# Patient Record
Sex: FEMALE | Race: WHITE | NOT HISPANIC OR LATINO | Employment: FULL TIME | URBAN - NONMETROPOLITAN AREA
[De-identification: names, ages, dates, MRNs, and addresses within clinical notes are randomized per-mention and may not be internally consistent; named-entity substitution may affect disease eponyms.]

---

## 2017-01-17 ENCOUNTER — OFFICE VISIT (OUTPATIENT)
Dept: FAMILY MEDICINE CLINIC | Facility: CLINIC | Age: 22
End: 2017-01-17

## 2017-01-17 VITALS
BODY MASS INDEX: 22.58 KG/M2 | HEART RATE: 128 BPM | SYSTOLIC BLOOD PRESSURE: 118 MMHG | HEIGHT: 59 IN | WEIGHT: 112 LBS | TEMPERATURE: 98.1 F | DIASTOLIC BLOOD PRESSURE: 78 MMHG

## 2017-01-17 DIAGNOSIS — R00.0 SINUS TACHYCARDIA: ICD-10-CM

## 2017-01-17 DIAGNOSIS — F51.01 PRIMARY INSOMNIA: ICD-10-CM

## 2017-01-17 DIAGNOSIS — R53.83 FATIGUE, UNSPECIFIED TYPE: ICD-10-CM

## 2017-01-17 DIAGNOSIS — K58.2 IRRITABLE BOWEL SYNDROME WITH BOTH CONSTIPATION AND DIARRHEA: Primary | ICD-10-CM

## 2017-01-17 LAB
BASOPHILS NFR BLD AUTO: 0.3 % (ref 0–2)
EOSINOPHIL NFR BLD AUTO: 0.8 % (ref 0–7)
ERYTHROCYTE [DISTWIDTH] IN BLOOD: 11.5 % (ref 11.5–14.5)
GRANULOCYTES NFR BLD AUTO: 58.3 % (ref 37–80)
HCT VFR BLD CALC: 39.2 % (ref 35–45)
HGB BLD-MCNC: 14 GM/DL (ref 12–15.5)
LYMPHOCYTES NFR BLD AUTO: 33.2 % (ref 10–50)
MCH RBC QN: 31.4 PG (ref 26–34)
MCHC RBC-ENTMCNC: 35.7 GM/DL (ref 31.4–36)
MCV RBC: 87.9 FL (ref 80–98)
MONOCYTES NFR BLD AUTO: 7.4 % (ref 0–12)
NRBC BLD AUTO-RTO: 0 %
NRBC SPEC MANUAL: 0
PLATELET # BLD: 184 X1000/MM3 (ref 150–450)
PMV BLD: 11.1 FL (ref 8–12)
RBC # BLD: 4.46 MEGA/MM3 (ref 3.77–5.16)
WBC # BLD: 6.6 X1000/UL (ref 3.2–9.8)

## 2017-01-17 PROCEDURE — 99214 OFFICE O/P EST MOD 30 MIN: CPT | Performed by: FAMILY MEDICINE

## 2017-01-17 RX ORDER — HYOSCYAMINE SULFATE 0.125 MG
0.12 TABLET ORAL EVERY 4 HOURS PRN
Qty: 120 TABLET | Refills: 5 | Status: SHIPPED | OUTPATIENT
Start: 2017-01-17 | End: 2017-03-17 | Stop reason: SDUPTHER

## 2017-01-17 RX ORDER — METOPROLOL SUCCINATE 25 MG/1
25 TABLET, EXTENDED RELEASE ORAL DAILY
Qty: 30 TABLET | Refills: 5 | Status: SHIPPED | OUTPATIENT
Start: 2017-01-17 | End: 2017-02-17 | Stop reason: SDUPTHER

## 2017-01-17 NOTE — PROGRESS NOTES
"Subjective   Chief Complaint   Patient presents with   • Irritable Bowel Syndrome     follw up     Nai Gold is a 21 y.o. female.   Irritable Bowel Syndrome (follw up)    Insomnia   This is a chronic problem. The current episode started more than 1 year ago. The problem occurs daily. The problem has been unchanged. Associated symptoms include arthralgias, fatigue and myalgias. The symptoms are aggravated by stress. She has tried nothing for the symptoms.      IBS with predominant diarrhea and has since developed issues with constipation recently  Previously failed bentyl for antispasmodic  egd done with normal results  Cannot tolerate prep for colonoscopy  Has previous abdominal surgery with cholecystectomy and  section  Has not tried cholestyramine due to issues with constipation, bloating  Has tired a diet high in fruits and vegetables    The following portions of the patient's history were reviewed and updated as appropriate: allergies, current medications, past family history, past medical history, past social history, past surgical history and problem list.    Review of Systems   Constitutional: Positive for fatigue. Negative for appetite change.   HENT: Negative for ear pain and rhinorrhea.    Eyes: Negative for pain.   Respiratory: Negative for shortness of breath.    Cardiovascular: Negative for palpitations.   Gastrointestinal: Positive for constipation. Negative for diarrhea.        Abdominal bloating   Genitourinary: Negative for dysuria.   Musculoskeletal: Positive for arthralgias and myalgias. Negative for back pain.   Neurological: Negative for dizziness.   Psychiatric/Behavioral: The patient has insomnia.        Objective   Visit Vitals   • /78   • Pulse (!) 128   • Temp 98.1 °F (36.7 °C)   • Ht 59\" (149.9 cm)   • Wt 112 lb (50.8 kg)   • LMP 2017   • BMI 22.62 kg/m2     Physical Exam   Constitutional: She is oriented to person, place, and time. She appears well-developed and " well-nourished.   HENT:   Head: Normocephalic and atraumatic.   Eyes: Pupils are equal, round, and reactive to light.   Neck: Normal range of motion. Neck supple.   Cardiovascular: Normal rate, regular rhythm and normal heart sounds.    Pulmonary/Chest: Effort normal and breath sounds normal. No respiratory distress. She has no wheezes. She has no rales.   Abdominal: Soft. Bowel sounds are normal. There is no tenderness.   Musculoskeletal: Normal range of motion.   Neurological: She is alert and oriented to person, place, and time.   Skin: Skin is warm and dry.   Psychiatric: She has a normal mood and affect.   Nursing note and vitals reviewed.      Assessment/Plan   Problems Addressed this Visit        Cardiovascular and Mediastinum    Sinus tachycardia    Relevant Medications    metoprolol succinate XL (TOPROL XL) 25 MG 24 hr tablet       Digestive    Irritable bowel syndrome with both constipation and diarrhea - Primary       Other    Primary insomnia    Fatigue    Relevant Orders    CBC & Differential    Comprehensive Metabolic Panel    Vitamin D 25 Hydroxy    Vitamin B12 & Folate    TSH+Free T4        Ordered lab work    Recommended melatonin at bedtime    Dr Peña colonoscopy - scheduled for consultation tomorrow    Start metoprolol for heart rate    Educational handout on diet for IBS provided    Start probiotic    Start levbid    Recheck in 2 - 4 weeks

## 2017-01-17 NOTE — PATIENT INSTRUCTIONS
Diet for Irritable Bowel Syndrome  When you have irritable bowel syndrome (IBS), the foods you eat and your eating habits are very important. IBS may cause various symptoms, such as abdominal pain, constipation, or diarrhea. Choosing the right foods can help ease discomfort caused by these symptoms. Work with your health care provider and dietitian to find the best eating plan to help control your symptoms.  WHAT GENERAL GUIDELINES DO I NEED TO FOLLOW?  · Keep a food diary. This will help you identify foods that cause symptoms. Write down:    What you eat and when.    What symptoms you have.    When symptoms occur in relation to your meals.  · Avoid foods that cause symptoms. Talk with your dietitian about other ways to get the same nutrients that are in these foods.  · Eat more foods that contain fiber. Take a fiber supplement if directed by your dietitian.  · Eat your meals slowly, in a relaxed setting.  · Aim to eat 5-6 small meals per day. Do not skip meals.  · Drink enough fluids to keep your urine clear or pale yellow.  · Ask your health care provider if you should take an over-the-counter probiotic during flare-ups to help restore healthy gut bacteria.  · If you have cramping or diarrhea, try making your meals low in fat and high in carbohydrates. Examples of carbohydrates are pasta, rice, whole grain breads and cereals, fruits, and vegetables.  · If dairy products cause your symptoms to flare up, try eating less of them. You might be able to handle yogurt better than other dairy products because it contains bacteria that help with digestion.  WHAT FOODS ARE NOT RECOMMENDED?  The following are some foods and drinks that may worsen your symptoms:  · Fatty foods, such as French fries.  · Milk products, such as cheese or ice cream.  · Chocolate.  · Alcohol.  · Products with caffeine, such as coffee.  · Carbonated drinks, such as soda.  The items listed above may not be a complete list of foods and beverages to  avoid. Contact your dietitian for more information.  WHAT FOODS ARE GOOD SOURCES OF FIBER?  Your health care provider or dietitian may recommend that you eat more foods that contain fiber. Fiber can help reduce constipation and other IBS symptoms. Add foods with fiber to your diet a little at a time so that your body can get used to them. Too much fiber at once might cause gas and swelling of your abdomen. The following are some foods that are good sources of fiber:  · Apples.  · Peaches.  · Pears.  · Berries.  · Figs.  · Broccoli (raw).  · Cabbage.  · Carrots.  · Raw peas.  · Kidney beans.  · Lima beans.  · Whole grain bread.  · Whole grain cereal.  FOR MORE INFORMATION   International Foundation for Functional Gastrointestinal Disorders: www.iffgd.org  National [place] of Diabetes and Digestive and Kidney Diseases: www.niddk.nih.gov/health-information/health-topics/digestive-diseases/ibs/Pages/facts.aspx     This information is not intended to replace advice given to you by your health care provider. Make sure you discuss any questions you have with your health care provider.     Document Released: 03/09/2005 Document Revised: 01/08/2016 Document Reviewed: 03/20/2015  [employer] Interactive Patient Education ©2016 [employer] Inc.

## 2017-01-17 NOTE — MR AVS SNAPSHOT
Nai Gold   1/17/2017 9:30 AM   Office Visit    Dept Phone:  [phone]   Encounter #:  [id number]    Provider:  Beth Avila MD   Department:  [facility]                Your Full Care Plan              Today's Medication Changes          These changes are accurate as of: 1/17/17 10:04 AM.  If you have any questions, ask your nurse or doctor.               New Medication(s)Ordered:     hyoscyamine 0.125 MG tablet   Commonly known as:  ANASPAZ,LEVSIN   Take 1 tablet by mouth Every 4 (Four) Hours As Needed for cramping or diarrhea.   Started by:  Beth Avila MD       metoprolol succinate XL 25 MG 24 hr tablet   Commonly known as:  TOPROL XL   Take 1 tablet by mouth Daily.   Started by:  Beth Avila MD         Stop taking medication(s)listed here:     cholestyramine light 4 G packet   Commonly known as:  PREVALITE   Stopped by:  Beth Avila MD                Where to Get Your Medications      These medications were sent to [address] [phone] [facility] [phone] [address] [address]     Phone:  [phone]     hyoscyamine 0.125 MG tablet    metoprolol succinate XL 25 MG 24 hr tablet                  Your Updated Medication List          This list is accurate as of: 1/17/17 10:04 AM.  Always use your most recent med list.                hyoscyamine 0.125 MG tablet   Commonly known as:  ANASPAZ,LEVSIN   Take 1 tablet by mouth Every 4 (Four) Hours As Needed for cramping or diarrhea.       metoprolol succinate XL 25 MG 24 hr tablet   Commonly known as:  TOPROL XL   Take 1 tablet by mouth Daily.       * NUVARING VA       * NUVARING 0.12-0.015 MG/24HR vaginal ring   Generic drug:  etonogestrel-ethinyl estradiol       ondansetron 4 MG tablet   Commonly known as:  ZOFRAN   Take 1 tablet by mouth Every 8 (Eight) Hours.       raNITIdine 150 MG tablet   Commonly known as:  ZANTAC       ZOLOFT 50 MG tablet   Generic drug:  sertraline       * Notice:  This list has 2 medication(s) that are the same as other medications prescribed for you. Read the directions carefully, and ask your doctor or other care provider to review them with you.            We Performed the Following     CBC & Differential     TSH+Free T4     Vitamin B12 & Folate     Vitamin D 25 Hydroxy       You Were Diagnosed With        Codes Comments    Irritable bowel syndrome with both constipation and diarrhea    -  Primary ICD-10-CM: K58.2  ICD-9-CM: 564.1     Fatigue, unspecified type     ICD-10-CM: R53.83  ICD-9-CM: 780.79     Sinus tachycardia     ICD-10-CM: R00.0  ICD-9-CM: 427.89       Instructions    Diet for Irritable Bowel Syndrome  When you have irritable bowel syndrome (IBS), the foods you eat and your eating habits are very important. IBS may cause various symptoms, such as abdominal pain, constipation, or diarrhea. Choosing the right foods can help ease discomfort caused by these symptoms. Work with your health care provider and dietitian to find the best eating plan to help control your symptoms.  WHAT GENERAL GUIDELINES DO I NEED TO FOLLOW?  · Keep a food diary. This will help you identify foods that cause symptoms. Write down:    What you eat and when.    What symptoms you have.    When symptoms occur in relation to your meals.  · Avoid foods that cause symptoms. Talk with your dietitian about other ways to get the same nutrients that are in these foods.  · Eat more foods that contain fiber. Take a fiber supplement if directed by your dietitian.  · Eat your meals slowly, in a relaxed setting.  · Aim to eat 5-6 small meals per day. Do not skip meals.  · Drink enough fluids to keep your urine clear or pale yellow.  · Ask your health care provider if you should take an over-the-counter probiotic during flare-ups to help restore healthy gut bacteria.  · If you have  cramping or diarrhea, try making your meals low in fat and high in carbohydrates. Examples of carbohydrates are pasta, rice, whole grain breads and cereals, fruits, and vegetables.  · If dairy products cause your symptoms to flare up, try eating less of them. You might be able to handle yogurt better than other dairy products because it contains bacteria that help with digestion.  WHAT FOODS ARE NOT RECOMMENDED?  The following are some foods and drinks that may worsen your symptoms:  · Fatty foods, such as French fries.  · Milk products, such as cheese or ice cream.  · Chocolate.  · Alcohol.  · Products with caffeine, such as coffee.  · Carbonated drinks, such as soda.  The items listed above may not be a complete list of foods and beverages to avoid. Contact your dietitian for more information.  WHAT FOODS ARE GOOD SOURCES OF FIBER?  Your health care provider or dietitian may recommend that you eat more foods that contain fiber. Fiber can help reduce constipation and other IBS symptoms. Add foods with fiber to your diet a little at a time so that your body can get used to them. Too much fiber at once might cause gas and swelling of your abdomen. The following are some foods that are good sources of fiber:  · Apples.  · Peaches.  · Pears.  · Berries.  · Figs.  · Broccoli (raw).  · Cabbage.  · Carrots.  · Raw peas.  · Kidney beans.  · Lima beans.  · Whole grain bread.  · Whole grain cereal.  FOR MORE INFORMATION   International Foundation for Functional Gastrointestinal Disorders: www.iffgd.org  National [place] of Diabetes and Digestive and Kidney Diseases: www.niddk.nih.gov/health-information/health-topics/digestive-diseases/ibs/Pages/facts.aspx     This information is not intended to replace advice given to you by your health care provider. Make sure you discuss any questions you have with your health care provider.     Document Released: 03/09/2005 Document Revised: 01/08/2016 Document Reviewed:  "03/20/2015  [employer] Interactive Patient Education ©2016 [employer]       Patient Instructions History      Upcoming Appointments     Visit Type Date Time Department    FOLLOW UP 1/17/2017  9:30 AM [facility]    CONSULT 1/18/2017  3:00 PM [facility]      [facility] Signup     Our records indicate that you have an active [employer] account.    You can view your After Visit Summary by going to [employer] and logging in with your [employer] username and password.  If you don't have a [employer] username and password but a parent or guardian has access to your record, the parent or guardian should login with their own [employer] username and password and access your record to view the After Visit Summary.    If you have questions, you can email [email or web address] or call [phone] to talk to our [employer] staff.  Remember, [employer] is NOT to be used for urgent needs.  For medical emergencies, dial 911.               Other Info from Your Visit           Your Appointments     Jan 18, 2017  3:00 PM CST   Consult with Munir Peña MD   [facility] GENERAL SURGERY (--)    [address] [place] [address] Dr  [facility] [address] [place] [id number] [phone]           Please bring all current insurance documents. Bring list of current medications.              Allergies     Percocet [Oxycodone-acetaminophen]        Reason for Visit     Irritable Bowel Syndrome follw up      Vital Signs     Blood Pressure Pulse Temperature Height Weight Last Menstrual Period    118/78 (85 %/ 91 %)* 128 98.1 °F (36.7 °C) 59\" (149.9 cm) (2 %, Z= -2.06)† 112 lb (50.8 kg) (20 %, Z= -0.83)† 01/09/2017    Body Mass Index Smoking Status                22.62 kg/m2 (62 %, Z= 0.31)† Never Smoker        *BP percentiles are based on NHBPEP's 4th Report    †Growth percentiles are based on CDC 2-20 Years data.      Problems and Diagnoses Noted     Irritable bowel syndrome with both constipation and " diarrhea    Tiredness        Sinus tachycardia

## 2017-01-18 ENCOUNTER — TELEPHONE (OUTPATIENT)
Dept: FAMILY MEDICINE CLINIC | Facility: CLINIC | Age: 22
End: 2017-01-18

## 2017-01-18 ENCOUNTER — CONSULT (OUTPATIENT)
Dept: SURGERY | Facility: CLINIC | Age: 22
End: 2017-01-18

## 2017-01-18 VITALS
DIASTOLIC BLOOD PRESSURE: 84 MMHG | BODY MASS INDEX: 22.38 KG/M2 | HEIGHT: 59 IN | WEIGHT: 111 LBS | SYSTOLIC BLOOD PRESSURE: 118 MMHG

## 2017-01-18 DIAGNOSIS — K58.2 IRRITABLE BOWEL SYNDROME WITH BOTH CONSTIPATION AND DIARRHEA: Primary | ICD-10-CM

## 2017-01-18 LAB — 25(OH)D2 SERPL-MCNC: 36.5 NG/ML (ref 30–100)

## 2017-01-18 PROCEDURE — 99203 OFFICE O/P NEW LOW 30 MIN: CPT | Performed by: SURGERY

## 2017-01-18 NOTE — PROGRESS NOTES
Subjective   Nai Gold is a 21 y.o. female.     History of Present Illness     Referred by Dr. Lo for consideration for colonoscopy.  Patient is now 21 years of age and since she was 12 years of age she's had problems with constipation and diarrhea with abdominal pain.  She is actually been followed by GI in [place] and then a GI physician in [place] prior to removing to [facility].  The GI physician in [place] had attempted to do a colonoscopy on her but she could not tolerate the prep so they did not proceed been managing her symptoms are actually after her baby was born 16 months ago she says that her symptoms are less frequent and seems to be less severe.  She tells me that she was worked up by the GI doctors for Crohn's disease and did not think that she had Crohn's disease.  She's had her gallbladder removed.  Social History     Social History   • Marital status: Single     Spouse name: N/A   • Number of children: N/A   • Years of education: N/A     Occupational History   • Not on file.     Social History Main Topics   • Smoking status: Never Smoker   • Smokeless tobacco: Never Used   • Alcohol use No   • Drug use: No   • Sexual activity: Yes     Partners: Male      Comment: nuva ring for birth control     Other Topics Concern   • Not on file     Social History Narrative       Past Medical History   Diagnosis Date   • Depression    • Dyspepsia    • GERD (gastroesophageal reflux disease)    • IBS (irritable bowel syndrome)    • Sinus tachycardia        Family History   Problem Relation Age of Onset   • Hypertension Mother    • Hypertension Father    • Arthritis Father    • Hyperlipidemia Father    • Heart disease Father    • No Known Problems Sister    • No Known Problems Brother        Past Surgical History   Procedure Laterality Date   • Tonsillectomy     • Cholecystectomy     •  section     • Endoscopy  2016       Review of Systems   Constitutional: Negative.    Eyes: Negative.     Respiratory: Positive for cough and shortness of breath. Negative for choking.    Cardiovascular: Positive for palpitations.   Gastrointestinal: Positive for abdominal pain, constipation, nausea and vomiting.   Endocrine: Negative.    Genitourinary: Positive for urgency.   Musculoskeletal: Negative.    Skin: Negative.    Allergic/Immunologic: Negative.    Neurological: Positive for headaches.   Hematological: Negative.    Psychiatric/Behavioral: The patient is nervous/anxious.        Objective   Physical Exam   Constitutional: She is oriented to person, place, and time. She appears well-developed and well-nourished. No distress.   HENT:   Head: Normocephalic and atraumatic.   Nose: Nose normal.   Eyes: Conjunctivae are normal.   Neck: Normal range of motion. No tracheal deviation present. No thyromegaly present.   Cardiovascular: Normal rate, regular rhythm and normal heart sounds.    No murmur heard.  Pulmonary/Chest: Effort normal and breath sounds normal. No respiratory distress. She has no wheezes. She has no rales. She exhibits no tenderness.   Abdominal: Soft. She exhibits no distension. There is no tenderness. There is no rebound and no guarding. No hernia.   Musculoskeletal: She exhibits no tenderness or deformity.   Neurological: She is alert and oriented to person, place, and time.   Skin: Skin is warm and dry. No rash noted.   Psychiatric: She has a normal mood and affect. Her behavior is normal. Judgment and thought content normal.   Vitals reviewed.      Assessment/Plan   Symptoms a very well be consistent with irritable bowel syndrome.  Symptoms ask he seemed to be improving and she seems to be managing things with her BMI of over 23 currently she's had difficulty doing bowel preps in the past and I don't really think at this point doing a colonoscopy would be worthwhile unless her symptoms get worse for some reason or change.  I would also consider referral to a gastroenterologist who can assist her  in managing her symptoms especially for colonoscopies unremarkable.  I went over all this with the patient and also discussed this with Dr. Lo in a patient will follow up with Dr. Lo and will follow up with us on a when necessary basis

## 2017-01-18 NOTE — TELEPHONE ENCOUNTER
Called patient and told her lab results. She prefers to get b12  Injections. She also stated she wants to talk with doctor about her thyroid.

## 2017-01-18 NOTE — TELEPHONE ENCOUNTER
----- Message from Beth Avila MD sent at 1/18/2017 10:23 AM CST -----  b12 low.  Folate normal.  tsh - normal.  Vitamin D - normal.  Recommend replacement of b12 - need to get weekly injection x 4 and then monthly.  Will then retest blood work.  If she doesn't want injections then start oral supplement of 1000mcg daily.

## 2017-01-20 ENCOUNTER — OFFICE VISIT (OUTPATIENT)
Dept: FAMILY MEDICINE CLINIC | Facility: CLINIC | Age: 22
End: 2017-01-20

## 2017-01-20 VITALS
DIASTOLIC BLOOD PRESSURE: 74 MMHG | HEART RATE: 116 BPM | WEIGHT: 111 LBS | SYSTOLIC BLOOD PRESSURE: 118 MMHG | BODY MASS INDEX: 22.38 KG/M2 | HEIGHT: 59 IN | TEMPERATURE: 98.9 F

## 2017-01-20 DIAGNOSIS — R53.82 CHRONIC FATIGUE: Primary | ICD-10-CM

## 2017-01-20 DIAGNOSIS — K58.2 IRRITABLE BOWEL SYNDROME WITH BOTH CONSTIPATION AND DIARRHEA: ICD-10-CM

## 2017-01-20 DIAGNOSIS — R00.0 SINUS TACHYCARDIA: ICD-10-CM

## 2017-01-20 PROCEDURE — 99213 OFFICE O/P EST LOW 20 MIN: CPT | Performed by: FAMILY MEDICINE

## 2017-01-20 RX ORDER — ETONOGESTREL AND ETHINYL ESTRADIOL 11.7; 2.7 MG/1; MG/1
1 INSERT, EXTENDED RELEASE VAGINAL
Qty: 1 EACH | Refills: 12 | Status: SHIPPED | OUTPATIENT
Start: 2017-01-20 | End: 2017-06-29

## 2017-01-20 NOTE — PROGRESS NOTES
Subjective   Chief Complaint   Patient presents with   • Irritable Bowel Syndrome     4 week f/u   • Med Refill     nuva ring     Nai Gold is a 21 y.o. female.   Irritable Bowel Syndrome (4 week f/u) and Med Refill (nuva ring)    History of Present Illness     IBS with predominant diarrhea and has since developed issues with constipation recently  Previously failed bentyl for antispasmodic  egd done with normal results  Cannot tolerate prep for colonoscopy  Has previous abdominal surgery with cholecystectomy and  section  Has not tried cholestyramine due to issues with constipation, bloating  Has tired a diet high in fruits and vegetables  Started on anaspaz for cramping and diarrhea  Saw Dr Peña for possible screening colonoscopy due to uncontrolled IBS but this decided that was unnecessary until further notice    Sinus tachycardia - started on metoprolol    Fatigue - chronic fatigue x 9 years  Had a previous ultrasound of neck 3 years ago but normal  lab work returned with normal thyroid levels, vitamin D levels  But was deficient in B12 - replaced    b12 deficiency - started weekly injections    H/o grandmother has parathyroid disease with normal thyroid labs  History of fatigue, tachycardia, weight loss  PTH checked and had parathyroid disease    The following portions of the patient's history were reviewed and updated as appropriate: allergies, current medications, past family history, past medical history, past social history, past surgical history and problem list.    Review of Systems   Constitutional: Positive for fatigue. Negative for appetite change, chills and fever.   HENT: Negative for congestion, ear pain, rhinorrhea and sore throat.    Eyes: Negative for pain.   Respiratory: Negative for cough and shortness of breath.    Cardiovascular: Positive for palpitations. Negative for chest pain.   Gastrointestinal: Positive for abdominal pain. Negative for constipation, diarrhea and nausea.  "  Genitourinary: Negative for dysuria.   Musculoskeletal: Negative for back pain, joint swelling and neck pain.   Skin: Negative for rash.   Neurological: Negative for dizziness and headaches.       Objective   Visit Vitals   • /74   • Pulse 116   • Temp 98.9 °F (37.2 °C)   • Ht 59\" (149.9 cm)   • Wt 111 lb (50.3 kg)   • LMP 01/09/2017   • BMI 22.42 kg/m2     Physical Exam   Constitutional: She is oriented to person, place, and time. She appears well-developed and well-nourished.   HENT:   Head: Normocephalic and atraumatic.   Eyes: Pupils are equal, round, and reactive to light.   Neck: Normal range of motion. Neck supple.   Cardiovascular: Regular rhythm and normal heart sounds.  Tachycardia present.    Pulmonary/Chest: Effort normal and breath sounds normal. No respiratory distress. She has no wheezes. She has no rales.   Abdominal: Soft. Bowel sounds are increased. There is no tenderness.   Musculoskeletal: Normal range of motion.   Neurological: She is alert and oriented to person, place, and time.   Skin: Skin is warm and dry.   Psychiatric: She has a normal mood and affect.   Nursing note and vitals reviewed.      Assessment/Plan   Problems Addressed this Visit        Cardiovascular and Mediastinum    Sinus tachycardia       Digestive    Irritable bowel syndrome with both constipation and diarrhea       Other    Fatigue - Primary    Relevant Orders    PTH, Intact    Calcium, Ionized        Lab work ordered    Refilled nuvaring    Recheck in 4 weeks for follow up         "

## 2017-01-20 NOTE — PATIENT INSTRUCTIONS
"Vitamin B12 Deficiency  Not having enough vitamin B12 is called a deficiency. Vitamin B12 is an important vitamin. Your body needs vitamin B12 to:   · Make red blood cells.  · Make DNA. This is the genetic material inside all of your cells.  · Help your nerves work properly so they can carry messages from your brain to your body.  CAUSES  · Not eating enough foods that contain vitamin B12.  · Not having enough stomach acid and digestive juices. The body needs these to absorb vitamin B12 from the food you eat.  · Having certain digestive system diseases that make it hard to absorb vitamin B12. These diseases include Crohn's disease, chronic pancreatitis, and cystic fibrosis.  · Having pernicious anemia, which is a condition where the body has too few red blood cells. People with this condition do not make enough of a protein called \"intrinsic factor,\" which is needed to absorb vitamin B12.  · Having a surgery in which part of the stomach or small intestine is removed.  · Taking certain medicines that make it hard for the body to absorb vitamin B12. These medicines include:    Heartburn medicine (antacids and proton pump inhibitors).    A certain antibiotic medicine called neomycin, which fights infection.    Some medicines used to treat diabetes, tuberculosis, gout, and high cholesterol.  RISK FACTORS  Risk factors are things that make you more likely to develop a vitamin B12 deficiency. They include:  · Being older than 50.  · Being a vegetarian.  · Being pregnant and a vegetarian or having a poor diet.  · Taking certain drugs.  · Being an alcoholic.  SYMPTOMS  You may have a vitamin B12 deficiency with no symptoms. However, a vitamin B12 deficiency can cause health problems like anemia and nerve damage. These health problems can lead to many possible symptoms, including:  · Weakness.  · Fatigue.  · Loss of appetite.  · Weight loss.  · Numbness or tingling in your hands and feet.  · Redness and burning of the " tongue.  · Confusion or memory problems.  · Depression.  · Dizziness.  · Sensory problems, such as loss of taste, color blindness, and ringing in the ears.  · Diarrhea or constipation.  · Trouble walking.  DIAGNOSIS  Various types of tests can be given to help find the cause of your vitamin B12 deficiency. These tests include:  · A complete blood count (CBC). This test gives your caregiver an overall picture of what makes up your blood.  · A blood test to measure your B12 level.  · A blood test to measure intrinsic factor.  · An endoscopy. This procedure uses a thin tube with a camera on the end to look into your stomach or intestines.  TREATMENT  Treatment for vitamin B12 deficiency depends on what is causing it. Common options include:  · Changing your eating and drinking habits, such as:    Eating more foods that contain vitamin B12.    Not drinking as much alcohol or any alcohol.  · Taking vitamin B12 supplements. Your caregiver will tell you what dose is best for you.  · Getting vitamin B12 injections. Some people get these a few times a week. Others get them once a month.  HOME CARE INSTRUCTIONS  · Take all supplements as directed by your caregiver. Follow the directions carefully.  · Get any injections your caregiver prescribes. Do not miss your appointments.  · Eat lots of healthy foods that contain vitamin B12. Ask your caregiver if you should work with a nutritionist. Good things to include in your diet are:    Meat.    Poultry.    Fish.    Eggs.    Fortified cereal and dairy products. This means vitamin B12 has been added to the food. Check the label on the package to be sure.  · Do not abuse alcohol.  · Keep all follow-up appointments. Your caregiver will need to perform blood tests to make sure your vitamin B12 deficiency is going away.  SEEK MEDICAL CARE IF:  · You have any questions about your treatment.  · Your symptoms come back.  MAKE SURE YOU:  · Understand these instructions.  · Will watch your  condition.  · Will get help right away if you are not doing well or get worse.     This information is not intended to replace advice given to you by your health care provider. Make sure you discuss any questions you have with your health care provider.     Document Released: 03/11/2013 Document Reviewed: 05/04/2016  [employer] Interactive Patient Education ©2016 [employer]

## 2017-01-23 ENCOUNTER — TELEPHONE (OUTPATIENT)
Dept: FAMILY MEDICINE CLINIC | Facility: CLINIC | Age: 22
End: 2017-01-23

## 2017-02-17 ENCOUNTER — OFFICE VISIT (OUTPATIENT)
Dept: FAMILY MEDICINE CLINIC | Facility: CLINIC | Age: 22
End: 2017-02-17

## 2017-02-17 VITALS
TEMPERATURE: 97.7 F | BODY MASS INDEX: 21.97 KG/M2 | HEART RATE: 143 BPM | SYSTOLIC BLOOD PRESSURE: 116 MMHG | HEIGHT: 59 IN | WEIGHT: 109 LBS | DIASTOLIC BLOOD PRESSURE: 74 MMHG

## 2017-02-17 DIAGNOSIS — K58.2 IRRITABLE BOWEL SYNDROME WITH BOTH CONSTIPATION AND DIARRHEA: Primary | ICD-10-CM

## 2017-02-17 DIAGNOSIS — R00.0 SINUS TACHYCARDIA: ICD-10-CM

## 2017-02-17 DIAGNOSIS — E53.8 B12 DEFICIENCY: ICD-10-CM

## 2017-02-17 PROCEDURE — 99213 OFFICE O/P EST LOW 20 MIN: CPT | Performed by: FAMILY MEDICINE

## 2017-02-17 PROCEDURE — 96372 THER/PROPH/DIAG INJ SC/IM: CPT | Performed by: FAMILY MEDICINE

## 2017-02-17 RX ORDER — METOPROLOL SUCCINATE 50 MG/1
50 TABLET, EXTENDED RELEASE ORAL DAILY
Qty: 30 TABLET | Refills: 5 | Status: SHIPPED | OUTPATIENT
Start: 2017-02-17 | End: 2017-03-17 | Stop reason: SDUPTHER

## 2017-02-17 RX ADMIN — CYANOCOBALAMIN 1000 MCG: 1000 INJECTION, SOLUTION INTRAMUSCULAR; SUBCUTANEOUS at 10:02

## 2017-02-17 NOTE — PROGRESS NOTES
Subjective   Chief Complaint   Patient presents with   • Irritable Bowel Syndrome     4 week follow up     Nai Gold is a 21 y.o. female.   Irritable Bowel Syndrome (4 week follow up)    History of Present Illness     IBS with predominant diarrhea and has since developed issues with constipation recently  Previously failed bentyl for antispasmodic  egd done with normal results  Has previous abdominal surgery with cholecystectomy and  section  Has not tried cholestyramine due to issues with constipation, bloating  Has tired a diet high in fruits and vegetables  Started on anaspaz for cramping and diarrhea - this has improved abdominal pain  Saw Dr Peña for possible screening colonoscopy due to uncontrolled IBS but this decided that was unnecessary until further notice  [place] stool chart - averages between type 1-3  Constipation remains primary issue today    Sinus tachycardia - not controlled with metoprolol    b12 deficiency - finished weekly injections, starting monthly injections today    Depression - controlled with zoloft    The following portions of the patient's history were reviewed and updated as appropriate: allergies, current medications, past family history, past medical history, past social history, past surgical history and problem list.    Review of Systems   Constitutional: Negative for appetite change, chills, fatigue and fever.   HENT: Negative for congestion, ear pain, rhinorrhea and sore throat.    Eyes: Negative for pain.   Respiratory: Negative for cough and shortness of breath.    Cardiovascular: Positive for palpitations. Negative for chest pain.   Gastrointestinal: Positive for constipation. Negative for abdominal pain, diarrhea and nausea.   Genitourinary: Negative for dysuria.   Musculoskeletal: Negative for back pain, joint swelling and neck pain.   Skin: Negative for rash.   Neurological: Negative for dizziness and headaches.       Objective   Visit Vitals   • /74  "  • Pulse (!) 143   • Temp 97.7 °F (36.5 °C)   • Ht 59\" (149.9 cm)   • Wt 109 lb (49.4 kg)   • LMP 01/29/2017   • BMI 22.02 kg/m2     Physical Exam   Constitutional: She is oriented to person, place, and time. She appears well-developed and well-nourished.   HENT:   Head: Normocephalic and atraumatic.   Eyes: Pupils are equal, round, and reactive to light.   Neck: Normal range of motion. Neck supple.   Cardiovascular: Normal rate, regular rhythm and normal heart sounds.    Pulmonary/Chest: Effort normal and breath sounds normal. No respiratory distress. She has no wheezes. She has no rales.   Abdominal: Soft. Bowel sounds are normal.   Musculoskeletal: Normal range of motion.   Neurological: She is alert and oriented to person, place, and time.   Skin: Skin is warm and dry.   Psychiatric: She has a normal mood and affect.   Nursing note and vitals reviewed.      Assessment/Plan   Problems Addressed this Visit        Cardiovascular and Mediastinum    Sinus tachycardia    Relevant Medications    metoprolol succinate XL (TOPROL-XL) 50 MG 24 hr tablet       Digestive    Irritable bowel syndrome with both constipation and diarrhea - Primary    B12 deficiency        Increase metoprolol to 50mg - take 2 -25mg once a day    Continue with levbid PRN for abdominal pain and cramping    Add dietary fiber supplement to breakfast - start with 1/2 dose  May add also dietary fiber to regular diet - bananas, oatmeal, beans, whole grain breads  Increase water intake 9- 8oz glasses per day    b12 today and changing to monthly  Will repeat blood work after next injection    Refilled zoloft    Recheck in 4 weeks             "

## 2017-02-17 NOTE — PATIENT INSTRUCTIONS
Constipation, Adult  Constipation is when a person has fewer than three bowel movements a week, has difficulty having a bowel movement, or has stools that are dry, hard, or larger than normal. As people grow older, constipation is more common. A low-fiber diet, not taking in enough fluids, and taking certain medicines may make constipation worse.   CAUSES   · Certain medicines, such as antidepressants, pain medicine, iron supplements, antacids, and water pills.    · Certain diseases, such as diabetes, irritable bowel syndrome (IBS), thyroid disease, or depression.    · Not drinking enough water.    · Not eating enough fiber-rich foods.    · Stress or travel.    · Lack of physical activity or exercise.    · Ignoring the urge to have a bowel movement.    · Using laxatives too much.    SIGNS AND SYMPTOMS   · Having fewer than three bowel movements a week.    · Straining to have a bowel movement.    · Having stools that are hard, dry, or larger than normal.    · Feeling full or bloated.    · Pain in the lower abdomen.    · Not feeling relief after having a bowel movement.    DIAGNOSIS   Your health care provider will take a medical history and perform a physical exam. Further testing may be done for severe constipation. Some tests may include:  · A barium enema X-ray to examine your rectum, colon, and, sometimes, your small intestine.    · A sigmoidoscopy to examine your lower colon.    · A colonoscopy to examine your entire colon.  TREATMENT   Treatment will depend on the severity of your constipation and what is causing it. Some dietary treatments include drinking more fluids and eating more fiber-rich foods. Lifestyle treatments may include regular exercise. If these diet and lifestyle recommendations do not help, your health care provider may recommend taking over-the-counter laxative medicines to help you have bowel movements. Prescription medicines may be prescribed if over-the-counter medicines do not work.    HOME CARE INSTRUCTIONS   · Eat foods that have a lot of fiber, such as fruits, vegetables, whole grains, and beans.  · Limit foods high in fat and processed sugars, such as french fries, hamburgers, cookies, candies, and soda.    · A fiber supplement may be added to your diet if you cannot get enough fiber from foods.    · Drink enough fluids to keep your urine clear or pale yellow.    · Exercise regularly or as directed by your health care provider.    · Go to the restroom when you have the urge to go. Do not hold it.    · Only take over-the-counter or prescription medicines as directed by your health care provider. Do not take other medicines for constipation without talking to your health care provider first.    SEEK IMMEDIATE MEDICAL CARE IF:   · You have bright red blood in your stool.    · Your constipation lasts for more than 4 days or gets worse.    · You have abdominal or rectal pain.    · You have thin, pencil-like stools.    · You have unexplained weight loss.  MAKE SURE YOU:   · Understand these instructions.  · Will watch your condition.  · Will get help right away if you are not doing well or get worse.     This information is not intended to replace advice given to you by your health care provider. Make sure you discuss any questions you have with your health care provider.     Document Released: 09/15/2005 Document Revised: 01/08/2016 Document Reviewed: 09/29/2014  [employer] Interactive Patient Education ©2016 [employer]  High-Fiber Diet  Fiber, also called dietary fiber, is a type of carbohydrate found in fruits, vegetables, whole grains, and beans. A high-fiber diet can have many health benefits. Your health care provider may recommend a high-fiber diet to help:  · Prevent constipation. Fiber can make your bowel movements more regular.  · Lower your cholesterol.  · Relieve hemorrhoids, uncomplicated diverticulosis, or irritable bowel syndrome.  · Prevent overeating as part of a weight-loss  plan.  · Prevent heart disease, type 2 diabetes, and certain cancers.  WHAT IS MY PLAN?  The recommended daily intake of fiber includes:  · 38 grams for men under age 50.  · 30 grams for men over age 50.  · 25 grams for women under age 50.  · 21 grams for women over age 50.  You can get the recommended daily intake of dietary fiber by eating a variety of fruits, vegetables, grains, and beans. Your health care provider may also recommend a fiber supplement if it is not possible to get enough fiber through your diet.  WHAT DO I NEED TO KNOW ABOUT A HIGH-FIBER DIET?  · Fiber supplements have not been widely studied for their effectiveness, so it is better to get fiber through food sources.  · Always check the fiber content on the nutrition facts label of any prepackaged food. Look for foods that contain at least 5 grams of fiber per serving.  · Ask your dietitian if you have questions about specific foods that are related to your condition, especially if those foods are not listed in the following section.  · Increase your daily fiber consumption gradually. Increasing your intake of dietary fiber too quickly may cause bloating, cramping, or gas.  · Drink plenty of water. Water helps you to digest fiber.  WHAT FOODS CAN I EAT?  Grains  Whole-grain breads. Multigrain cereal. Oats and oatmeal. Brown rice. Barley. Bulgur wheat. Millet. Bran muffins. Popcorn. Rye wafer crackers.  Vegetables  Sweet potatoes. Spinach. Kale. Artichokes. Cabbage. Broccoli. Green peas. Carrots. Squash.  Fruits  Berries. Pears. Apples. Oranges. Avocados. Prunes and raisins. Dried figs.  Meats and Other Protein Sources  Navy, kidney, georges, and soy beans. Split peas. Lentils. Nuts and seeds.  Dairy  Fiber-fortified yogurt.  Beverages  Fiber-fortified soy milk. Fiber-fortified orange juice.  Other  Fiber bars.  The items listed above may not be a complete list of recommended foods or beverages. Contact your dietitian for more options.  WHAT FOODS  ARE NOT RECOMMENDED?  Grains  White bread. Pasta made with refined flour. White rice.  Vegetables  Fried potatoes. Canned vegetables. Well-cooked vegetables.   Fruits  Fruit juice. Cooked, strained fruit.  Meats and Other Protein Sources  Fatty cuts of meat. Fried poultry or fried fish.  Dairy  Milk. Yogurt. Cream cheese. Sour cream.  Beverages  Soft drinks.  Other  Cakes and pastries. Butter and oils.  The items listed above may not be a complete list of foods and beverages to avoid. Contact your dietitian for more information.  WHAT ARE SOME TIPS FOR INCLUDING HIGH-FIBER FOODS IN MY DIET?  · Eat a wide variety of high-fiber foods.  · Make sure that half of all grains consumed each day are whole grains.  · Replace breads and cereals made from refined flour or white flour with whole-grain breads and cereals.  · Replace white rice with brown rice, bulgur wheat, or millet.  · Start the day with a breakfast that is high in fiber, such as a cereal that contains at least 5 grams of fiber per serving.  · Use beans in place of meat in soups, salads, or pasta.  · Eat high-fiber snacks, such as berries, raw vegetables, nuts, or popcorn.     This information is not intended to replace advice given to you by your health care provider. Make sure you discuss any questions you have with your health care provider.     Document Released: 12/18/2006 Document Revised: 01/08/2016 Document Reviewed: 06/02/2015  [employer] Interactive Patient Education ©2016 [employer]

## 2017-03-15 ENCOUNTER — OFFICE VISIT (OUTPATIENT)
Dept: FAMILY MEDICINE CLINIC | Facility: CLINIC | Age: 22
End: 2017-03-15

## 2017-03-15 ENCOUNTER — TELEPHONE (OUTPATIENT)
Dept: FAMILY MEDICINE CLINIC | Facility: CLINIC | Age: 22
End: 2017-03-15

## 2017-03-15 VITALS
DIASTOLIC BLOOD PRESSURE: 68 MMHG | BODY MASS INDEX: 22.18 KG/M2 | HEART RATE: 103 BPM | HEIGHT: 59 IN | TEMPERATURE: 97.2 F | SYSTOLIC BLOOD PRESSURE: 118 MMHG | WEIGHT: 110 LBS

## 2017-03-15 DIAGNOSIS — R10.2 PELVIC PAIN: Primary | ICD-10-CM

## 2017-03-15 DIAGNOSIS — N94.10 DYSPAREUNIA IN FEMALE: ICD-10-CM

## 2017-03-15 LAB
BACTERIA UR QL AUTO: ABNORMAL /HPF
BILIRUB UR QL STRIP: NEGATIVE
CLARITY UR: ABNORMAL
COLOR UR: YELLOW
GLUCOSE UR STRIP-MCNC: NEGATIVE MG/DL
HGB UR QL STRIP.AUTO: ABNORMAL
HYALINE CASTS UR QL AUTO: ABNORMAL /LPF
KETONES UR QL STRIP: NEGATIVE
LEUKOCYTE ESTERASE UR QL STRIP.AUTO: NEGATIVE
NITRITE UR QL STRIP: NEGATIVE
PH UR STRIP.AUTO: 5.5 [PH] (ref 5.5–8)
PROT UR QL STRIP: ABNORMAL
RBC # UR: ABNORMAL /HPF
REF LAB TEST METHOD: ABNORMAL
SP GR UR STRIP: >=1.03 (ref 1–1.03)
SQUAMOUS #/AREA URNS HPF: ABNORMAL /HPF
UROBILINOGEN UR QL STRIP: ABNORMAL
WBC UR QL AUTO: ABNORMAL /HPF

## 2017-03-15 PROCEDURE — 99212 OFFICE O/P EST SF 10 MIN: CPT | Performed by: FAMILY MEDICINE

## 2017-03-15 PROCEDURE — 87480 CANDIDA DNA DIR PROBE: CPT | Performed by: FAMILY MEDICINE

## 2017-03-15 PROCEDURE — 87510 GARDNER VAG DNA DIR PROBE: CPT | Performed by: FAMILY MEDICINE

## 2017-03-15 PROCEDURE — 81001 URINALYSIS AUTO W/SCOPE: CPT | Performed by: FAMILY MEDICINE

## 2017-03-15 PROCEDURE — 87660 TRICHOMONAS VAGIN DIR PROBE: CPT | Performed by: FAMILY MEDICINE

## 2017-03-15 NOTE — TELEPHONE ENCOUNTER
----- Message from Beth Avila MD sent at 3/15/2017 12:17 PM CDT -----  Urine is negative for infection

## 2017-03-15 NOTE — PROGRESS NOTES
"Subjective   Chief Complaint   Patient presents with   • Abdominal Pain     cramping, for about 3 weeks     Nai Gold is a 21 y.o. female.   Abdominal Pain (cramping, for about 3 weeks)    Pelvic Pain   The patient's primary symptoms include pelvic pain and vaginal discharge. This is a new problem. The current episode started 1 to 4 weeks ago. The problem occurs constantly. The problem has been gradually worsening. The pain is moderate. Affected Side: midline. She is not pregnant. Associated symptoms include dysuria, flank pain, nausea and painful intercourse. Pertinent negatives include no frequency or urgency. The vaginal discharge was milky and watery. There has been no bleeding. She has not been passing clots. She has not been passing tissue. The symptoms are aggravated by intercourse. She has tried acetaminophen and NSAIDs for the symptoms. The treatment provided no relief. She is sexually active. No, her partner does not have an STD. She uses a contraceptive ring for contraception. Her menstrual history has been regular.      The following portions of the patient's history were reviewed and updated as appropriate: allergies, current medications, past family history, past medical history, past social history, past surgical history and problem list.    Review of Systems   Gastrointestinal: Positive for nausea.   Genitourinary: Positive for dyspareunia, dysuria, flank pain, pelvic pain, vaginal discharge and vaginal pain. Negative for difficulty urinating, enuresis, frequency and urgency.       Objective   Visit Vitals   • /68   • Pulse 103   • Temp 97.2 °F (36.2 °C)   • Ht 59\" (149.9 cm)   • Wt 110 lb (49.9 kg)   • LMP 02/01/2017   • BMI 22.22 kg/m2     Physical Exam   Constitutional: She is oriented to person, place, and time. She appears well-developed and well-nourished.   HENT:   Head: Normocephalic and atraumatic.   Eyes: Pupils are equal, round, and reactive to light.   Neck: Normal range of " motion. Neck supple.   Cardiovascular: Normal rate, regular rhythm and normal heart sounds.    Pulmonary/Chest: Effort normal and breath sounds normal. No respiratory distress. She has no wheezes. She has no rales.   Abdominal: Soft. Bowel sounds are normal. There is tenderness in the right lower quadrant, suprapubic area and left lower quadrant.   Genitourinary: Vagina normal. There is no rash, tenderness, lesion or injury on the right labia. There is no rash, tenderness, lesion or injury on the left labia.   Musculoskeletal: Normal range of motion.   Neurological: She is alert and oriented to person, place, and time.   Skin: Skin is warm and dry.   Psychiatric: She has a normal mood and affect.   Nursing note and vitals reviewed.      Assessment/Plan   Problems Addressed this Visit     None      Visit Diagnoses     Pelvic pain    -  Primary    Relevant Orders    Urinalysis w/Culture if Indicated (Completed)    Gardnerella vaginalis, Trichomonas vaginalis, Candida albicans, PCR    Urinalysis, Microscopic Only (Completed)    Dyspareunia in female            Urinalysis today    Vaginosis panel today    Recheck as needed

## 2017-03-17 ENCOUNTER — OFFICE VISIT (OUTPATIENT)
Dept: FAMILY MEDICINE CLINIC | Facility: CLINIC | Age: 22
End: 2017-03-17

## 2017-03-17 VITALS
DIASTOLIC BLOOD PRESSURE: 60 MMHG | TEMPERATURE: 97.3 F | HEART RATE: 124 BPM | WEIGHT: 109 LBS | BODY MASS INDEX: 21.97 KG/M2 | HEIGHT: 59 IN | SYSTOLIC BLOOD PRESSURE: 108 MMHG

## 2017-03-17 DIAGNOSIS — E53.8 B12 DEFICIENCY: ICD-10-CM

## 2017-03-17 DIAGNOSIS — K58.2 IRRITABLE BOWEL SYNDROME WITH BOTH CONSTIPATION AND DIARRHEA: Primary | ICD-10-CM

## 2017-03-17 DIAGNOSIS — R00.0 SINUS TACHYCARDIA: ICD-10-CM

## 2017-03-17 LAB
CANDIDA ALBICANS: NEGATIVE
GARDNERELLA VAGINALIS: NEGATIVE
TRICHOMONAS VAGINALIS PCR: NEGATIVE

## 2017-03-17 PROCEDURE — 99214 OFFICE O/P EST MOD 30 MIN: CPT | Performed by: FAMILY MEDICINE

## 2017-03-17 PROCEDURE — 96372 THER/PROPH/DIAG INJ SC/IM: CPT | Performed by: FAMILY MEDICINE

## 2017-03-17 RX ORDER — LUBIPROSTONE 8 UG/1
8 CAPSULE ORAL 2 TIMES DAILY WITH MEALS
Qty: 60 CAPSULE | Refills: 5 | Status: SHIPPED | OUTPATIENT
Start: 2017-03-17 | End: 2017-04-03

## 2017-03-17 RX ORDER — METOPROLOL SUCCINATE 50 MG/1
50 TABLET, EXTENDED RELEASE ORAL DAILY
COMMUNITY
End: 2017-06-05

## 2017-03-17 RX ORDER — METOPROLOL SUCCINATE 100 MG/1
100 TABLET, EXTENDED RELEASE ORAL DAILY
Qty: 30 TABLET | Refills: 5 | Status: SHIPPED | OUTPATIENT
Start: 2017-03-17 | End: 2017-03-17

## 2017-03-17 RX ORDER — HYOSCYAMINE SULFATE 0.125 MG
0.12 TABLET ORAL EVERY 4 HOURS PRN
Qty: 120 TABLET | Refills: 5 | Status: SHIPPED | OUTPATIENT
Start: 2017-03-17 | End: 2017-06-05 | Stop reason: SDUPTHER

## 2017-03-17 RX ADMIN — CYANOCOBALAMIN 1000 MCG: 1000 INJECTION, SOLUTION INTRAMUSCULAR; SUBCUTANEOUS at 09:33

## 2017-03-17 NOTE — PROGRESS NOTES
Subjective   Chief Complaint   Patient presents with   • Irritable Bowel Syndrome     4 week follow up     Nai Gold is a 21 y.o. female.   Irritable Bowel Syndrome (4 week follow up)    History of Present Illness     IBS with predominant constipation with intermittent diarrhea  Previously failed bentyl for antispasmodic  egd done with normal results  Has previous abdominal surgery with cholecystectomy and  section  Has not tried cholestyramine due to issues with constipation, bloating  Has tired a diet high in fruits and vegetables  Started on anaspaz for cramping and diarrhea - this has improved abdominal pain  Saw Dr Peña for possible screening colonoscopy due to uncontrolled IBS but this decided that was unnecessary until further notice  [place] stool chart - averages between type 1-3  Constipation remains primary issue  Last BM - 3/15, [place] stool chart type 2  Has not had a trial of amitiza or linzess    Sinus tachycardia - not controlled with metoprolol\  Having problems with titrating medication due to symptomatic hypotension    b12 deficiency - finished weekly injections, starting monthly injections today  Will repeat lab work next visit    Depression - controlled with zoloft    The following portions of the patient's history were reviewed and updated as appropriate: allergies, current medications, past family history, past medical history, past social history, past surgical history and problem list.    Review of Systems   Constitutional: Negative for appetite change, chills, fatigue and fever.   HENT: Negative for congestion, ear pain, rhinorrhea and sore throat.    Eyes: Negative for pain.   Respiratory: Negative for cough and shortness of breath.    Cardiovascular: Negative for chest pain and palpitations.   Gastrointestinal: Positive for constipation. Negative for abdominal pain, diarrhea and nausea.   Genitourinary: Negative for dysuria.   Musculoskeletal: Negative for back pain, joint  "swelling and neck pain.   Skin: Negative for rash.   Neurological: Negative for dizziness and headaches.       Objective   Visit Vitals   • /60   • Pulse (!) 124   • Temp 97.3 °F (36.3 °C)   • Ht 59\" (149.9 cm)   • Wt 109 lb (49.4 kg)   • LMP 02/01/2017   • BMI 22.02 kg/m2     Physical Exam   Constitutional: She is oriented to person, place, and time. She appears well-developed and well-nourished.   HENT:   Head: Normocephalic and atraumatic.   Eyes: Pupils are equal, round, and reactive to light.   Neck: Normal range of motion. Neck supple.   Cardiovascular: Normal rate, regular rhythm and normal heart sounds.    Pulmonary/Chest: Effort normal and breath sounds normal. No respiratory distress. She has no wheezes. She has no rales.   Abdominal: Soft. Bowel sounds are normal.   Musculoskeletal: Normal range of motion.   Neurological: She is alert and oriented to person, place, and time.   Skin: Skin is warm and dry.   Psychiatric: She has a normal mood and affect.   Nursing note and vitals reviewed.      Assessment/Plan   Problems Addressed this Visit        Cardiovascular and Mediastinum    Sinus tachycardia    Relevant Orders    Ambulatory Referral to Cardiology       Digestive    Irritable bowel syndrome with both constipation and diarrhea - Primary    Relevant Medications    lubiprostone (AMITIZA) 8 MCG capsule    B12 deficiency        Start amitiza  Samples provided in office    Continue with metoprolol  Referral to Dr Ospina    Continue with levbid    Recheck in 4 weeks               "

## 2017-03-20 ENCOUNTER — TELEPHONE (OUTPATIENT)
Dept: FAMILY MEDICINE CLINIC | Facility: CLINIC | Age: 22
End: 2017-03-20

## 2017-04-03 ENCOUNTER — OFFICE VISIT (OUTPATIENT)
Dept: FAMILY MEDICINE CLINIC | Facility: CLINIC | Age: 22
End: 2017-04-03

## 2017-04-03 VITALS
BODY MASS INDEX: 21.37 KG/M2 | OXYGEN SATURATION: 97 % | WEIGHT: 106 LBS | DIASTOLIC BLOOD PRESSURE: 68 MMHG | SYSTOLIC BLOOD PRESSURE: 118 MMHG | TEMPERATURE: 97.9 F | HEIGHT: 59 IN | HEART RATE: 131 BPM

## 2017-04-03 DIAGNOSIS — R11.2 NON-INTRACTABLE VOMITING WITH NAUSEA, UNSPECIFIED VOMITING TYPE: Primary | ICD-10-CM

## 2017-04-03 DIAGNOSIS — K58.2 IRRITABLE BOWEL SYNDROME WITH BOTH CONSTIPATION AND DIARRHEA: ICD-10-CM

## 2017-04-03 DIAGNOSIS — R00.0 SINUS TACHYCARDIA: ICD-10-CM

## 2017-04-03 PROCEDURE — 99212 OFFICE O/P EST SF 10 MIN: CPT | Performed by: FAMILY MEDICINE

## 2017-04-03 PROCEDURE — 96372 THER/PROPH/DIAG INJ SC/IM: CPT | Performed by: FAMILY MEDICINE

## 2017-04-03 RX ORDER — ONDANSETRON 4 MG/1
4 TABLET, ORALLY DISINTEGRATING ORAL EVERY 8 HOURS PRN
Qty: 30 TABLET | Refills: 5 | Status: SHIPPED | OUTPATIENT
Start: 2017-04-03 | End: 2017-06-05 | Stop reason: SINTOL

## 2017-04-03 RX ORDER — ONDANSETRON 2 MG/ML
4 INJECTION INTRAMUSCULAR; INTRAVENOUS ONCE
Status: COMPLETED | OUTPATIENT
Start: 2017-04-03 | End: 2017-04-03

## 2017-04-03 RX ORDER — LUBIPROSTONE 24 UG/1
24 CAPSULE ORAL 2 TIMES DAILY WITH MEALS
Qty: 60 CAPSULE | Refills: 5 | Status: SHIPPED | OUTPATIENT
Start: 2017-04-03 | End: 2017-04-04 | Stop reason: ALTCHOICE

## 2017-04-03 RX ADMIN — ONDANSETRON 4 MG: 2 INJECTION INTRAMUSCULAR; INTRAVENOUS at 10:12

## 2017-04-03 NOTE — PATIENT INSTRUCTIONS
zofran - 4mg IM    zofran ODT    Recommended sips of clear liquids today    In 3 days start amitiza - samples provided in the office    Recheck as needed

## 2017-04-03 NOTE — PROGRESS NOTES
Subjective   Chief Complaint   Patient presents with   • Irritable Bowel Syndrome     diarreah, headache     Nai Golden is a 21 y.o. female.   Irritable Bowel Syndrome (diarreah, headache)    History of Present Illness     IBS with predominant constipation with intermittent diarrhea  Previously failed bentyl for antispasmodic  egd done with normal results  Has previous abdominal surgery with cholecystectomy and  section  Has not tried cholestyramine due to issues with constipation, bloating  Has tired a diet high in fruits and vegetables  Started on anaspaz for cramping and diarrhea - this has improved abdominal pain  Saw Dr Peña for possible screening colonoscopy due to uncontrolled IBS but this decided that was unnecessary until further notice  Tried a trial of the amitiza -patient did very well with samples of medication, abdominal pain controlled and no issues with diarrhea or constipation  Insurance denied coverage of prescription  Due to no coverage she was given linzess samples x 9 days  Started this medication 3/24  Patient complains of diarrhea started 3/26  Which progressed to nausea with vomiting   Not tolerating PO  Stopped linzess yesterday  Symptoms have not improved    Sinus tachycardia - not controlled with metoprolol  Cannot titrate medication due to symptomatic hypotension    The following portions of the patient's history were reviewed and updated as appropriate: allergies, current medications, past family history, past medical history, past social history, past surgical history and problem list.    Review of Systems   Constitutional: Negative for appetite change, chills, fatigue and fever.   HENT: Negative for congestion, ear pain, rhinorrhea and sore throat.    Eyes: Negative for pain.   Respiratory: Negative for cough and shortness of breath.    Cardiovascular: Negative for chest pain and palpitations.   Gastrointestinal: Positive for diarrhea, nausea and vomiting. Negative  "for abdominal pain and constipation.   Genitourinary: Negative for dysuria.   Musculoskeletal: Negative for back pain, joint swelling and neck pain.   Skin: Negative for rash.   Neurological: Negative for dizziness and headaches.       Objective   /68  Pulse (!) 131  Temp 97.9 °F (36.6 °C)  Ht 59\" (149.9 cm)  Wt 106 lb (48.1 kg)  LMP 03/26/2017  SpO2 97%  BMI 21.41 kg/m2  Physical Exam   Constitutional: She is oriented to person, place, and time. She appears well-developed and well-nourished.   HENT:   Head: Normocephalic and atraumatic.   Eyes: Pupils are equal, round, and reactive to light.   Neck: Normal range of motion. Neck supple.   Cardiovascular: Regular rhythm and normal heart sounds.  Tachycardia present.    Pulmonary/Chest: Effort normal and breath sounds normal. No respiratory distress. She has no wheezes. She has no rales.   Abdominal: Soft. Bowel sounds are normal.   Musculoskeletal: Normal range of motion.   Neurological: She is alert and oriented to person, place, and time.   Skin: Skin is warm and dry.   Psychiatric: She has a normal mood and affect.   Nursing note and vitals reviewed.      Assessment/Plan   Problems Addressed this Visit        Cardiovascular and Mediastinum    Sinus tachycardia    Relevant Orders    Ambulatory Referral to Cardiology       Digestive    Irritable bowel syndrome with both constipation and diarrhea      Other Visit Diagnoses     Non-intractable vomiting with nausea, unspecified vomiting type    -  Primary    Relevant Medications    ondansetron (ZOFRAN) injection 4 mg (Start on 4/3/2017 10:45 AM)    ondansetron ODT (ZOFRAN ODT) 4 MG disintegrating tablet    lubiprostone (AMITIZA) 24 MCG capsule        zofran - 4mg IM today    zofran ODT     Recommended sips of clear liquids today    In 3 days start amitiza - samples provided in the office    Recheck as needed         "

## 2017-04-04 DIAGNOSIS — R11.2 NON-INTRACTABLE VOMITING WITH NAUSEA, UNSPECIFIED VOMITING TYPE: ICD-10-CM

## 2017-04-04 RX ORDER — LUBIPROSTONE 24 UG/1
24 CAPSULE ORAL 2 TIMES DAILY WITH MEALS
Qty: 60 CAPSULE | Refills: 5 | Status: SHIPPED | OUTPATIENT
Start: 2017-04-04 | End: 2017-04-21 | Stop reason: SDUPTHER

## 2017-04-13 ENCOUNTER — OFFICE VISIT (OUTPATIENT)
Dept: CARDIOLOGY | Facility: CLINIC | Age: 22
End: 2017-04-13

## 2017-04-13 VITALS
BODY MASS INDEX: 21.59 KG/M2 | HEART RATE: 112 BPM | DIASTOLIC BLOOD PRESSURE: 70 MMHG | SYSTOLIC BLOOD PRESSURE: 106 MMHG | OXYGEN SATURATION: 98 % | WEIGHT: 107.1 LBS | HEIGHT: 59 IN

## 2017-04-13 DIAGNOSIS — R00.0 SINUS TACHYCARDIA: ICD-10-CM

## 2017-04-13 DIAGNOSIS — R00.0 TACHYCARDIA: Primary | ICD-10-CM

## 2017-04-13 PROCEDURE — 93010 ELECTROCARDIOGRAM REPORT: CPT | Performed by: INTERNAL MEDICINE

## 2017-04-13 PROCEDURE — 99202 OFFICE O/P NEW SF 15 MIN: CPT | Performed by: INTERNAL MEDICINE

## 2017-04-13 NOTE — PROGRESS NOTES
Chief complaint : Tachycardia  \  History of Present Illness this is a very pleasant 21-year-old female who comes today for cardiac evaluation.  Patient has been suffering from sinus tachycardia now for at least two  years.  Patient stated that her tachycardia didn't get worse during her recent pregnancy about 19 months ago.  She had a normal delivery.  There was no complications according to patient.  However this fast heartbeat has become progressively worse and is affecting her daily living.  Patient is nervous due to very weak and fatigued.  Patient also complains of chronic nausea.    Subjective      Review of Systems   Constitution: Negative. Negative for decreased appetite, diaphoresis, weakness, night sweats, weight gain and weight loss.   HENT: Negative for headaches, hearing loss, nosebleeds and sore throat.    Eyes: Negative.  Negative for blurred vision and photophobia.   Cardiovascular: Positive for palpitations. Negative for chest pain, claudication, dyspnea on exertion, irregular heartbeat, leg swelling, paroxysmal nocturnal dyspnea and syncope.   Respiratory: Negative for cough, hemoptysis, shortness of breath and wheezing.    Endocrine: Negative for cold intolerance, heat intolerance, polydipsia, polyphagia and polyuria.   Hematologic/Lymphatic: Negative.    Skin: Negative for color change, dry skin, flushing, itching and rash.   Musculoskeletal: Negative.  Negative for muscle cramps, muscle weakness and myalgias.   Gastrointestinal: Positive for nausea. Negative for abdominal pain, change in bowel habit, diarrhea, hematemesis, melena and vomiting.   Genitourinary: Negative for dysuria, frequency and hematuria.   Neurological: Negative for dizziness, focal weakness, light-headedness, loss of balance, numbness and seizures.   Psychiatric/Behavioral: Negative.  Negative for substance abuse, suicidal ideas and thoughts of violence.   Allergic/Immunologic: Negative.        Past Medical History:    Diagnosis Date   • Depression    • Dyspepsia    • GERD (gastroesophageal reflux disease)    • IBS (irritable bowel syndrome)    • Sinus tachycardia        Family History   Problem Relation Age of Onset   • Hypertension Mother    • Hypertension Father    • Arthritis Father    • Hyperlipidemia Father    • Heart disease Father    • No Known Problems Sister    • No Known Problems Brother        Percocet [oxycodone-acetaminophen]     reports that she has never smoked. She has never used smokeless tobacco. She reports that she does not drink alcohol or use illicit drugs.    Objective     Vital Signs  Heart Rate:  [112] 112  BP: (106)/(70) 106/70    Physical Exam   Constitutional: She is oriented to person, place, and time. She appears well-developed and well-nourished.   HENT:   Head: Normocephalic and atraumatic.   Eyes: Conjunctivae and EOM are normal. Pupils are equal, round, and reactive to light.   Neck: Neck supple. No JVD present. Carotid bruit is not present. No tracheal deviation and no edema present.   Cardiovascular: Normal rate, regular rhythm, S1 normal, S2 normal, normal heart sounds and intact distal pulses.  Exam reveals no gallop, no S3, no S4 and no friction rub.    No murmur heard.  Pulmonary/Chest: Effort normal and breath sounds normal. She has no wheezes. She has no rales. She exhibits no tenderness.   Abdominal: Bowel sounds are normal. She exhibits no abdominal bruit and no pulsatile midline mass. There is no rebound and no guarding.   Musculoskeletal: Normal range of motion. She exhibits no edema.   Neurological: She is alert and oriented to person, place, and time.   Skin: Skin is warm and dry.   Psychiatric: She has a normal mood and affect.         ECG 12 Lead  Date/Time: 4/13/2017 6:11 PM  Performed by: TRINITY FRANCOIS  Authorized by: TRINITY FRANCOIS   Previous ECG: no previous ECG available  Rhythm: sinus rhythm  Rate: normal  BPM: 92  Conduction: conduction normal  ST Segments: ST segments  normal  T Waves: T waves normal  QRS axis: normal  Other: no other findings  Clinical impression: normal ECG            Assessment/Plan     Patient Active Problem List   Diagnosis   • GERD (gastroesophageal reflux disease)   • Dyspepsia   • Irritable bowel syndrome with both constipation and diarrhea   • Chronic nausea   • Change in color of pigmented skin lesion   • Primary insomnia   • Sinus tachycardia   • Fatigue   • B12 deficiency     1. Tachycardia  Patient currently is on metoprolol.  The Toprol cannot be increased due to low blood pressure.  Etiology of her sinus tachycardia is unclear.  We will plan further investigation.  I have advised patient to increase her salt intake.  I have encouraged her to hydrate herself as much as she can.  If she continues to be tachycardic despite conservative measures and metoprolol I will consider adding Corlanor for heart rate control.  - Holter Monitor - 48 Hour  - Adult Transthoracic Echo Complete    2. Sinus tachycardia  As above    I discussed the patients findings and my recommendations with patient.    Jaspreet Ospina MD  04/13/17  6:08 PM    EMR Dragon/Transcription disclaimer:   Much of this encounter note is an electronic transcription/translation of spoken language to printed text. The electronic translation of spoken language may permit erroneous, or at times, nonsensical words or phrases to be inadvertently transcribed; Although I have reviewed the note for such errors, some may still exist.

## 2017-04-14 ENCOUNTER — OFFICE VISIT (OUTPATIENT)
Dept: FAMILY MEDICINE CLINIC | Facility: CLINIC | Age: 22
End: 2017-04-14

## 2017-04-14 VITALS
HEIGHT: 59 IN | HEART RATE: 94 BPM | BODY MASS INDEX: 21.57 KG/M2 | SYSTOLIC BLOOD PRESSURE: 100 MMHG | OXYGEN SATURATION: 98 % | DIASTOLIC BLOOD PRESSURE: 66 MMHG | WEIGHT: 107 LBS | TEMPERATURE: 98.3 F

## 2017-04-14 DIAGNOSIS — K58.2 IRRITABLE BOWEL SYNDROME WITH BOTH CONSTIPATION AND DIARRHEA: Primary | ICD-10-CM

## 2017-04-14 DIAGNOSIS — E53.8 B12 DEFICIENCY: ICD-10-CM

## 2017-04-14 DIAGNOSIS — R00.0 SINUS TACHYCARDIA: ICD-10-CM

## 2017-04-14 PROCEDURE — 99214 OFFICE O/P EST MOD 30 MIN: CPT | Performed by: FAMILY MEDICINE

## 2017-04-14 PROCEDURE — 96372 THER/PROPH/DIAG INJ SC/IM: CPT | Performed by: FAMILY MEDICINE

## 2017-04-14 RX ADMIN — CYANOCOBALAMIN 1000 MCG: 1000 INJECTION, SOLUTION INTRAMUSCULAR; SUBCUTANEOUS at 09:36

## 2017-04-14 NOTE — PROGRESS NOTES
Subjective   Chief Complaint   Patient presents with   • Irritable Bowel Syndrome     4 week follow up     Nai Golden is a 21 y.o. female.   Irritable Bowel Syndrome (4 week follow up)    History of Present Illness     IBS with predominant constipation with intermittent diarrhea  Previously failed bentyl for antispasmodic  egd done with normal results  Has previous abdominal surgery with cholecystectomy and  section  Has failed a diet high in fruits and vegetables  Started on anaspaz for cramping and diarrhea - this has improved abdominal pain  Saw Dr Peña for possible screening colonoscopy - not done  Tried a trial of the amitiza -patient did very well with samples of medication, abdominal pain controlled and no issues with diarrhea or constipation  Had regular bowel movements  Insurance denied coverage of prescription  Due to no coverage she was given linzess samples x 9 days  Started this medication 3/24, reported side effects with diarrhea, nausea, vomiting  Medication was stopped and amitiza samples were provided to have patient restart once her symptoms resolved    Sinus tachycardia - not controlled with metoprolol  Cannot titrate medication due to symptomatic hypotension  Referred to Dr Ospina, cardiology    The following portions of the patient's history were reviewed and updated as appropriate: allergies, current medications, past family history, past medical history, past social history, past surgical history and problem list.    Review of Systems   Constitutional: Negative for appetite change, chills, fatigue and fever.   HENT: Negative for congestion, ear pain, rhinorrhea and sore throat.    Eyes: Negative for pain.   Respiratory: Negative for cough and shortness of breath.    Cardiovascular: Negative for chest pain and palpitations.   Gastrointestinal: Positive for abdominal pain and constipation. Negative for diarrhea and nausea.   Genitourinary: Negative for dysuria.   Musculoskeletal:  "Negative for back pain, joint swelling and neck pain.   Skin: Negative for rash.   Neurological: Negative for dizziness and headaches.       Objective   /66  Pulse 94  Temp 98.3 °F (36.8 °C)  Ht 59\" (149.9 cm)  Wt 107 lb (48.5 kg)  LMP 03/26/2017  SpO2 98%  BMI 21.61 kg/m2  Physical Exam   Constitutional: She is oriented to person, place, and time. She appears well-developed and well-nourished.   HENT:   Head: Normocephalic and atraumatic.   Eyes: Pupils are equal, round, and reactive to light.   Neck: Normal range of motion. Neck supple.   Cardiovascular: Normal rate, regular rhythm and normal heart sounds.    Pulmonary/Chest: Effort normal and breath sounds normal. No respiratory distress. She has no wheezes. She has no rales.   Abdominal: Soft. Bowel sounds are normal.   Musculoskeletal: Normal range of motion.   Neurological: She is alert and oriented to person, place, and time.   Skin: Skin is warm and dry.   Psychiatric: She has a normal mood and affect.   Nursing note and vitals reviewed.    Assessment/Plan   Problems Addressed this Visit        Cardiovascular and Mediastinum    Sinus tachycardia       Digestive    Irritable bowel syndrome with both constipation and diarrhea - Primary    B12 deficiency    Relevant Orders    Vitamin B12 & Folate        Continue with metoprolol  Follow up with Dr Ospina for continued care    b12 IM today  Blood work ordered for next month    amitiza samples  Call PA today and find out about approval    Continue with anaspaz    Recheck in 4 weeks         "

## 2017-04-21 DIAGNOSIS — R11.2 NON-INTRACTABLE VOMITING WITH NAUSEA, UNSPECIFIED VOMITING TYPE: ICD-10-CM

## 2017-04-21 RX ORDER — LUBIPROSTONE 24 UG/1
24 CAPSULE ORAL 2 TIMES DAILY WITH MEALS
Qty: 60 CAPSULE | Refills: 5 | Status: SHIPPED | OUTPATIENT
Start: 2017-04-21 | End: 2017-06-05

## 2017-05-03 PROCEDURE — 82607 VITAMIN B-12: CPT | Performed by: FAMILY MEDICINE

## 2017-05-03 PROCEDURE — 82746 ASSAY OF FOLIC ACID SERUM: CPT | Performed by: FAMILY MEDICINE

## 2017-05-04 LAB
FOLATE SERPL-MCNC: 9.31 NG/ML (ref 2.76–21)
VIT B12 BLD-MCNC: 432 PG/ML (ref 239–931)

## 2017-05-08 ENCOUNTER — TELEPHONE (OUTPATIENT)
Dept: FAMILY MEDICINE CLINIC | Facility: CLINIC | Age: 22
End: 2017-05-08

## 2017-05-10 ENCOUNTER — OFFICE VISIT (OUTPATIENT)
Dept: FAMILY MEDICINE CLINIC | Facility: CLINIC | Age: 22
End: 2017-05-10

## 2017-05-10 VITALS
BODY MASS INDEX: 21.57 KG/M2 | DIASTOLIC BLOOD PRESSURE: 66 MMHG | WEIGHT: 107 LBS | OXYGEN SATURATION: 98 % | SYSTOLIC BLOOD PRESSURE: 108 MMHG | TEMPERATURE: 98.4 F | HEIGHT: 59 IN | HEART RATE: 113 BPM

## 2017-05-10 DIAGNOSIS — E53.8 B12 DEFICIENCY: ICD-10-CM

## 2017-05-10 DIAGNOSIS — R11.0 CHRONIC NAUSEA: Primary | ICD-10-CM

## 2017-05-10 DIAGNOSIS — K58.2 IRRITABLE BOWEL SYNDROME WITH BOTH CONSTIPATION AND DIARRHEA: ICD-10-CM

## 2017-05-10 DIAGNOSIS — R00.0 SINUS TACHYCARDIA: ICD-10-CM

## 2017-05-10 PROCEDURE — 99214 OFFICE O/P EST MOD 30 MIN: CPT | Performed by: FAMILY MEDICINE

## 2017-05-10 PROCEDURE — 96372 THER/PROPH/DIAG INJ SC/IM: CPT | Performed by: FAMILY MEDICINE

## 2017-05-10 RX ADMIN — CYANOCOBALAMIN 1000 MCG: 1000 INJECTION, SOLUTION INTRAMUSCULAR; SUBCUTANEOUS at 09:54

## 2017-05-15 ENCOUNTER — TELEPHONE (OUTPATIENT)
Dept: FAMILY MEDICINE CLINIC | Facility: CLINIC | Age: 22
End: 2017-05-15

## 2017-05-24 LAB
BH CV ECHO MEAS - % IVS THICK: 0 %
BH CV ECHO MEAS - % LVPW THICK: 34.5 %
BH CV ECHO MEAS - ACS: 1.8 CM
BH CV ECHO MEAS - AO MAX PG (FULL): 1.5 MMHG
BH CV ECHO MEAS - AO MAX PG: 5 MMHG
BH CV ECHO MEAS - AO MEAN PG (FULL): 1 MMHG
BH CV ECHO MEAS - AO MEAN PG: 3 MMHG
BH CV ECHO MEAS - AO ROOT AREA (BSA CORRECTED): 1.5
BH CV ECHO MEAS - AO ROOT AREA: 3.6 CM^2
BH CV ECHO MEAS - AO ROOT DIAM: 2.2 CM
BH CV ECHO MEAS - AO V2 MAX: 112 CM/SEC
BH CV ECHO MEAS - AO V2 MEAN: 76.4 CM/SEC
BH CV ECHO MEAS - AO V2 VTI: 20.3 CM
BH CV ECHO MEAS - AVA(I,A): 0.8 CM^2
BH CV ECHO MEAS - AVA(I,D): 0.8 CM^2
BH CV ECHO MEAS - AVA(V,A): 0.87 CM^2
BH CV ECHO MEAS - AVA(V,D): 0.87 CM^2
BH CV ECHO MEAS - BSA(HAYCOCK): 1.4 M^2
BH CV ECHO MEAS - BSA: 1.4 M^2
BH CV ECHO MEAS - BZI_BMI: 21.6 KILOGRAMS/M^2
BH CV ECHO MEAS - BZI_METRIC_HEIGHT: 149.9 CM
BH CV ECHO MEAS - BZI_METRIC_WEIGHT: 48.5 KG
BH CV ECHO MEAS - CONTRAST EF 4CH: 59.3 ML/M^2
BH CV ECHO MEAS - EDV(CUBED): 71 ML
BH CV ECHO MEAS - EDV(MOD-SP4): 117 ML
BH CV ECHO MEAS - EDV(TEICH): 75.9 ML
BH CV ECHO MEAS - EF(CUBED): 67 %
BH CV ECHO MEAS - EF(MOD-SP4): 59.3 %
BH CV ECHO MEAS - EF(TEICH): 59 %
BH CV ECHO MEAS - ESV(CUBED): 23.4 ML
BH CV ECHO MEAS - ESV(MOD-SP4): 47.6 ML
BH CV ECHO MEAS - ESV(TEICH): 31.1 ML
BH CV ECHO MEAS - FS: 30.9 %
BH CV ECHO MEAS - IVS/LVPW: 0.91
BH CV ECHO MEAS - IVSD: 0.74 CM
BH CV ECHO MEAS - IVSS: 0.74 CM
BH CV ECHO MEAS - LA DIMENSION: 2.8 CM
BH CV ECHO MEAS - LA/AO: 1.3
BH CV ECHO MEAS - LV DIASTOLIC VOL/BSA (35-75): 82.8 ML/M^2
BH CV ECHO MEAS - LV MASS(C)D: 95.6 GRAMS
BH CV ECHO MEAS - LV MASS(C)DI: 67.6 GRAMS/M^2
BH CV ECHO MEAS - LV MASS(C)S: 67.6 GRAMS
BH CV ECHO MEAS - LV MASS(C)SI: 47.8 GRAMS/M^2
BH CV ECHO MEAS - LV MAX PG: 3.5 MMHG
BH CV ECHO MEAS - LV MEAN PG: 2 MMHG
BH CV ECHO MEAS - LV SYSTOLIC VOL/BSA (12-30): 33.7 ML/M^2
BH CV ECHO MEAS - LV V1 MAX: 93.8 CM/SEC
BH CV ECHO MEAS - LV V1 MEAN: 63.3 CM/SEC
BH CV ECHO MEAS - LV V1 VTI: 15.6 CM
BH CV ECHO MEAS - LVIDD: 4.1 CM
BH CV ECHO MEAS - LVIDS: 2.9 CM
BH CV ECHO MEAS - LVLD AP4: 7.2 CM
BH CV ECHO MEAS - LVLS AP4: 6 CM
BH CV ECHO MEAS - LVOT AREA (M): 1.1 CM^2
BH CV ECHO MEAS - LVOT AREA: 1 CM^2
BH CV ECHO MEAS - LVOT DIAM: 1.2 CM
BH CV ECHO MEAS - LVPWD: 0.82 CM
BH CV ECHO MEAS - LVPWS: 1.1 CM
BH CV ECHO MEAS - MR MAX PG: 35.8 MMHG
BH CV ECHO MEAS - MR MAX VEL: 299 CM/SEC
BH CV ECHO MEAS - MV A MAX VEL: 67.9 CM/SEC
BH CV ECHO MEAS - MV DEC SLOPE: 476 CM/SEC^2
BH CV ECHO MEAS - MV E MAX VEL: 86.3 CM/SEC
BH CV ECHO MEAS - MV E/A: 1.3
BH CV ECHO MEAS - PA ACC SLOPE: 267 CM/SEC^2
BH CV ECHO MEAS - PA ACC TIME: 0.2 SEC
BH CV ECHO MEAS - PA MAX PG: 4.1 MMHG
BH CV ECHO MEAS - PA MEAN PG: 2 MMHG
BH CV ECHO MEAS - PA PR(ACCEL): -12.4 MMHG
BH CV ECHO MEAS - PA V2 MAX: 101.4 CM/SEC
BH CV ECHO MEAS - PA V2 MEAN: 58 CM/SEC
BH CV ECHO MEAS - PA V2 VTI: 19 CM
BH CV ECHO MEAS - PULM DIAS VEL: 47.5 CM/SEC
BH CV ECHO MEAS - PULM S/D: 1
BH CV ECHO MEAS - PULM SYS VEL: 48.5 CM/SEC
BH CV ECHO MEAS - RAP SYSTOLE: 10 MMHG
BH CV ECHO MEAS - SI(AO): 52.1 ML/M^2
BH CV ECHO MEAS - SI(CUBED): 33.6 ML/M^2
BH CV ECHO MEAS - SI(LVOT): 11.5 ML/M^2
BH CV ECHO MEAS - SI(MOD-SP4): 49.1 ML/M^2
BH CV ECHO MEAS - SI(TEICH): 31.7 ML/M^2
BH CV ECHO MEAS - SV(AO): 73.7 ML
BH CV ECHO MEAS - SV(CUBED): 47.6 ML
BH CV ECHO MEAS - SV(LVOT): 16.2 ML
BH CV ECHO MEAS - SV(MOD-SP4): 69.4 ML
BH CV ECHO MEAS - SV(TEICH): 44.8 ML

## 2017-06-05 ENCOUNTER — OFFICE VISIT (OUTPATIENT)
Dept: FAMILY MEDICINE CLINIC | Facility: CLINIC | Age: 22
End: 2017-06-05

## 2017-06-05 VITALS
TEMPERATURE: 97.8 F | OXYGEN SATURATION: 99 % | SYSTOLIC BLOOD PRESSURE: 110 MMHG | DIASTOLIC BLOOD PRESSURE: 68 MMHG | WEIGHT: 105 LBS | HEART RATE: 129 BPM | BODY MASS INDEX: 21.17 KG/M2 | HEIGHT: 59 IN

## 2017-06-05 DIAGNOSIS — K58.2 IRRITABLE BOWEL SYNDROME WITH BOTH CONSTIPATION AND DIARRHEA: Primary | ICD-10-CM

## 2017-06-05 DIAGNOSIS — R00.0 SINUS TACHYCARDIA: ICD-10-CM

## 2017-06-05 DIAGNOSIS — R11.0 CHRONIC NAUSEA: ICD-10-CM

## 2017-06-05 DIAGNOSIS — K31.84 GASTROPARESIS: ICD-10-CM

## 2017-06-05 DIAGNOSIS — E53.8 B12 DEFICIENCY: ICD-10-CM

## 2017-06-05 PROCEDURE — 99214 OFFICE O/P EST MOD 30 MIN: CPT | Performed by: FAMILY MEDICINE

## 2017-06-05 PROCEDURE — 96372 THER/PROPH/DIAG INJ SC/IM: CPT | Performed by: FAMILY MEDICINE

## 2017-06-05 RX ORDER — HYOSCYAMINE SULFATE 0.125 MG
0.12 TABLET ORAL EVERY 4 HOURS PRN
Qty: 120 TABLET | Refills: 5 | Status: SHIPPED | OUTPATIENT
Start: 2017-06-05 | End: 2017-07-26

## 2017-06-05 RX ORDER — RANITIDINE 150 MG/1
150 TABLET ORAL 2 TIMES DAILY
Qty: 60 TABLET | Refills: 5 | Status: SHIPPED | OUTPATIENT
Start: 2017-06-05 | End: 2020-08-19

## 2017-06-05 RX ORDER — PROCHLORPERAZINE MALEATE 5 MG/1
5 TABLET ORAL EVERY 6 HOURS PRN
Qty: 120 TABLET | Refills: 0 | Status: SHIPPED | OUTPATIENT
Start: 2017-06-05 | End: 2017-06-26 | Stop reason: SINTOL

## 2017-06-05 RX ADMIN — CYANOCOBALAMIN 1000 MCG: 1000 INJECTION, SOLUTION INTRAMUSCULAR; SUBCUTANEOUS at 08:45

## 2017-06-05 NOTE — PROGRESS NOTES
Subjective   Chief Complaint   Patient presents with   • Follow-up     4 wk   • Results     gastric emptying study   • Medication Problem     amitiza causes nausea     Nai Golden is a 21 y.o. female.   Follow-up (4 wk); Results (gastric emptying study); and Medication Problem (amitiza causes nausea)    History of Present Illness     IBS with predominant constipation with intermittent diarrhea  Previously failed bentyl for antispasmodic  egd done with normal results, could not tolerate prep for screening colonoscopy  Has previous abdominal surgery with cholecystectomy and  section  Has failed a diet high in fruits and vegetables  Started on anaspaz PRN for cramping and diarrhea - this has improved abdominal pain  Failed linzess - caused her nausea, vomiting  Having problems with amitiza causing worsening nausea  zofran is causing constipation    Delayed gastric empyting - confirmed by gastric study  Patient previously failed reglan - caused muscle twitching in the neck    Chronic nausea - remains uncontrolled  Patient complaining of early satiety, nausea, abdominal bloating and fullness  Uses zofran PRN    b12 deficiency - improvement of b12 value from January  Level has improved from 288 to 432  Will continue monthly injections and then plan to switch to oral supplement    Sinus tachycardia - not controlled with metoprolol  Cannot titrate medication due to symptomatic hypotension  Referred to Dr Ospina, cardiology  Echocardiogram and holter monitored ordered  Recheck scheduled 2017    The following portions of the patient's history were reviewed and updated as appropriate: allergies, current medications, past family history, past medical history, past social history, past surgical history and problem list.    Review of Systems   Constitutional: Negative for appetite change, chills, fatigue and fever.   HENT: Negative for congestion, ear pain, rhinorrhea and sore throat.    Eyes: Negative for pain.  "  Respiratory: Negative for cough and shortness of breath.    Cardiovascular: Negative for chest pain and palpitations.   Gastrointestinal: Positive for nausea. Negative for abdominal pain, constipation and diarrhea.   Genitourinary: Negative for dysuria.   Musculoskeletal: Negative for back pain, joint swelling and neck pain.   Skin: Negative for rash.   Neurological: Negative for dizziness and headaches.       Objective   /68  Pulse (!) 129  Temp 97.8 °F (36.6 °C) (Tympanic)   Ht 59\" (149.9 cm)  Wt 105 lb (47.6 kg)  LMP 04/29/2017  SpO2 99%  BMI 21.21 kg/m2  Physical Exam   Constitutional: She is oriented to person, place, and time. She appears well-developed and well-nourished.   HENT:   Head: Normocephalic and atraumatic.   Eyes: Pupils are equal, round, and reactive to light.   Neck: Normal range of motion. Neck supple.   Cardiovascular: Regular rhythm and normal heart sounds.  Tachycardia present.    Pulmonary/Chest: Effort normal and breath sounds normal. No respiratory distress. She has no wheezes. She has no rales.   Abdominal: Soft. Bowel sounds are normal.   Musculoskeletal: Normal range of motion.   Neurological: She is alert and oriented to person, place, and time.   Skin: Skin is warm and dry.   Psychiatric: She has a normal mood and affect.   Nursing note and vitals reviewed.      Assessment/Plan   Problems Addressed this Visit        Cardiovascular and Mediastinum    Sinus tachycardia       Digestive    Irritable bowel syndrome with both constipation and diarrhea - Primary    Chronic nausea    B12 deficiency    Gastroparesis    Relevant Medications    hyoscyamine (ANASPAZ,LEVSIN) 0.125 MG tablet    raNITIdine (ZANTAC) 150 MG tablet        Recheck scheduled with cardiology tomorrow    Stop amitiza  Start movantik - samples provided to the patient    Start b12 IM    Reviewed gastric emptying study with patient    Stop zofran  Start compazine  Will discuss case with GI    Use anaspaz - " PRN    Recheck in 4 weeks

## 2017-06-06 ENCOUNTER — OFFICE VISIT (OUTPATIENT)
Dept: CARDIOLOGY | Facility: CLINIC | Age: 22
End: 2017-06-06

## 2017-06-06 VITALS
HEART RATE: 94 BPM | OXYGEN SATURATION: 100 % | SYSTOLIC BLOOD PRESSURE: 106 MMHG | BODY MASS INDEX: 21.23 KG/M2 | WEIGHT: 105.3 LBS | HEIGHT: 59 IN | RESPIRATION RATE: 16 BRPM | DIASTOLIC BLOOD PRESSURE: 76 MMHG

## 2017-06-06 DIAGNOSIS — R42 DIZZINESS AND GIDDINESS: Primary | ICD-10-CM

## 2017-06-06 DIAGNOSIS — R00.0 SINUS TACHYCARDIA: ICD-10-CM

## 2017-06-06 PROCEDURE — 99212 OFFICE O/P EST SF 10 MIN: CPT | Performed by: INTERNAL MEDICINE

## 2017-06-06 NOTE — PROGRESS NOTES
Chief complaint : Cardiac and dizziness    History of Present Illness this is a 21-year-old female who comes today for follow-up and discuss the results of her echocardiogram and Holter monitoring.  The Holter monitor didn't reveal evidence of sinus tachycardia.  2-D echocardiogram revealed normal LV systolic function.  Patient stated that she is having lifestyle limiting tachycardia and dizziness and is having difficulty doing her daily chores.    Subjective      Review of Systems   Constitution: Negative. Negative for decreased appetite, diaphoresis, weakness, night sweats, weight gain and weight loss.   HENT: Negative for headaches, hearing loss, nosebleeds and sore throat.    Eyes: Negative.  Negative for blurred vision and photophobia.   Cardiovascular: Positive for palpitations. Negative for chest pain, claudication, dyspnea on exertion, irregular heartbeat, leg swelling, paroxysmal nocturnal dyspnea and syncope.   Respiratory: Negative for cough, hemoptysis, shortness of breath and wheezing.    Endocrine: Negative for cold intolerance, heat intolerance, polydipsia, polyphagia and polyuria.   Hematologic/Lymphatic: Negative.    Skin: Negative for color change, dry skin, flushing, itching and rash.   Musculoskeletal: Negative.  Negative for muscle cramps, muscle weakness and myalgias.   Gastrointestinal: Negative for abdominal pain, change in bowel habit, diarrhea, hematemesis, melena, nausea and vomiting.   Genitourinary: Negative for dysuria, frequency and hematuria.   Neurological: Positive for dizziness. Negative for focal weakness, light-headedness, loss of balance, numbness and seizures.   Psychiatric/Behavioral: Negative.  Negative for substance abuse, suicidal ideas and thoughts of violence.   Allergic/Immunologic: Negative.        Past Medical History:   Diagnosis Date   • Depression    • Dyspepsia    • GERD (gastroesophageal reflux disease)    • IBS (irritable bowel syndrome)    • Sinus tachycardia         Family History   Problem Relation Age of Onset   • Hypertension Mother    • Hypertension Father    • Arthritis Father    • Hyperlipidemia Father    • Heart disease Father    • No Known Problems Sister    • No Known Problems Brother        Percocet [oxycodone-acetaminophen]     reports that she has never smoked. She has never used smokeless tobacco. She reports that she does not drink alcohol or use illicit drugs.    Objective     Vital Signs  Heart Rate:  [94] 94  Resp:  [16] 16  BP: (106)/(76) 106/76    Physical Exam   Constitutional: She is oriented to person, place, and time. She appears well-developed and well-nourished.   HENT:   Head: Normocephalic and atraumatic.   Eyes: Conjunctivae and EOM are normal. Pupils are equal, round, and reactive to light.   Neck: Neck supple. No JVD present. Carotid bruit is not present. No tracheal deviation and no edema present.   Cardiovascular: Normal rate, regular rhythm, S1 normal, S2 normal, normal heart sounds and intact distal pulses.  Exam reveals no gallop, no S3, no S4 and no friction rub.    No murmur heard.  Pulmonary/Chest: Effort normal and breath sounds normal. She has no wheezes. She has no rales. She exhibits no tenderness.   Abdominal: Bowel sounds are normal. She exhibits no abdominal bruit and no pulsatile midline mass. There is no rebound and no guarding.   Musculoskeletal: Normal range of motion. She exhibits no edema.   Neurological: She is alert and oriented to person, place, and time.   Skin: Skin is warm and dry.   Psychiatric: She has a normal mood and affect.       Procedures    Assessment/Plan     Patient Active Problem List   Diagnosis   • GERD (gastroesophageal reflux disease)   • Dyspepsia   • Irritable bowel syndrome with both constipation and diarrhea   • Chronic nausea   • Change in color of pigmented skin lesion   • Primary insomnia   • Sinus tachycardia   • Fatigue   • B12 deficiency   • Gastroparesis   • Dizziness and giddiness     1.  Dizziness and giddiness  We will order a tilt table test to evaluate for possible postural orthostatic tachycardia syndrome.  I have advised patient to wear compressions stockings knee-high 30-40 mmHg.  We'll also perform a tilt table testing to confirm the diagnosis.    - Tilt Table    2. Sinus tachycardia  Patient is unable to tolerate the beta blocker due to hypotension.  Patient is encouraged to increase her salt intake and fluid intake.  I have also recommended graduated compression socks.    I discussed the patients findings and my recommendations with patient.          This document has been electronically signed by Jaspreet Ospina MD on June 6, 2017 12:12 PM     Jaspreet Ospina MD  06/06/17  12:11 PM

## 2017-06-26 ENCOUNTER — LAB (OUTPATIENT)
Dept: LAB | Facility: OTHER | Age: 22
End: 2017-06-26

## 2017-06-26 ENCOUNTER — OFFICE VISIT (OUTPATIENT)
Dept: FAMILY MEDICINE CLINIC | Facility: CLINIC | Age: 22
End: 2017-06-26

## 2017-06-26 VITALS
TEMPERATURE: 97.5 F | HEART RATE: 110 BPM | BODY MASS INDEX: 21.57 KG/M2 | WEIGHT: 107 LBS | OXYGEN SATURATION: 91 % | DIASTOLIC BLOOD PRESSURE: 72 MMHG | SYSTOLIC BLOOD PRESSURE: 118 MMHG | HEIGHT: 59 IN

## 2017-06-26 DIAGNOSIS — K31.84 GASTROPARESIS: ICD-10-CM

## 2017-06-26 DIAGNOSIS — R11.0 CHRONIC NAUSEA: ICD-10-CM

## 2017-06-26 DIAGNOSIS — K58.2 IRRITABLE BOWEL SYNDROME WITH BOTH CONSTIPATION AND DIARRHEA: ICD-10-CM

## 2017-06-26 DIAGNOSIS — M25.50 MULTIPLE JOINT PAIN: Primary | ICD-10-CM

## 2017-06-26 DIAGNOSIS — E53.8 B12 DEFICIENCY: ICD-10-CM

## 2017-06-26 DIAGNOSIS — M25.50 MULTIPLE JOINT PAIN: ICD-10-CM

## 2017-06-26 DIAGNOSIS — R00.0 SINUS TACHYCARDIA: ICD-10-CM

## 2017-06-26 DIAGNOSIS — K21.9 GASTROESOPHAGEAL REFLUX DISEASE WITHOUT ESOPHAGITIS: ICD-10-CM

## 2017-06-26 PROBLEM — R42 DIZZINESS AND GIDDINESS: Status: RESOLVED | Noted: 2017-06-06 | Resolved: 2017-06-26

## 2017-06-26 LAB
ERYTHROCYTE [SEDIMENTATION RATE] IN BLOOD: 2 MM/HR (ref 0–20)
RHEUMATOID FACT SERPL-ACNC: NEGATIVE [IU]/ML
URATE SERPL-MCNC: 3.6 MG/DL (ref 2.6–7.2)

## 2017-06-26 PROCEDURE — 84550 ASSAY OF BLOOD/URIC ACID: CPT | Performed by: FAMILY MEDICINE

## 2017-06-26 PROCEDURE — 99214 OFFICE O/P EST MOD 30 MIN: CPT | Performed by: FAMILY MEDICINE

## 2017-06-26 PROCEDURE — 86140 C-REACTIVE PROTEIN: CPT | Performed by: FAMILY MEDICINE

## 2017-06-26 PROCEDURE — 96372 THER/PROPH/DIAG INJ SC/IM: CPT | Performed by: FAMILY MEDICINE

## 2017-06-26 PROCEDURE — 86225 DNA ANTIBODY NATIVE: CPT

## 2017-06-26 PROCEDURE — 86431 RHEUMATOID FACTOR QUANT: CPT | Performed by: FAMILY MEDICINE

## 2017-06-26 PROCEDURE — 85651 RBC SED RATE NONAUTOMATED: CPT | Performed by: FAMILY MEDICINE

## 2017-06-26 PROCEDURE — 36415 COLL VENOUS BLD VENIPUNCTURE: CPT | Performed by: FAMILY MEDICINE

## 2017-06-26 PROCEDURE — 86038 ANTINUCLEAR ANTIBODIES: CPT | Performed by: FAMILY MEDICINE

## 2017-06-26 RX ORDER — SCOLOPAMINE TRANSDERMAL SYSTEM 1 MG/1
1 PATCH, EXTENDED RELEASE TRANSDERMAL
Qty: 10 PATCH | Refills: 0 | Status: ON HOLD | OUTPATIENT
Start: 2017-06-26 | End: 2018-04-23

## 2017-06-26 RX ORDER — MELOXICAM 7.5 MG/1
7.5 TABLET ORAL DAILY
Qty: 30 TABLET | Refills: 5 | Status: SHIPPED | OUTPATIENT
Start: 2017-06-26 | End: 2017-07-26

## 2017-06-26 RX ADMIN — CYANOCOBALAMIN 1000 MCG: 1000 INJECTION, SOLUTION INTRAMUSCULAR; SUBCUTANEOUS at 08:52

## 2017-06-26 NOTE — PROGRESS NOTES
Subjective   Chief Complaint   Patient presents with   • Irritable Bowel Syndrome     4 week follow up     Nai Golden is a 21 y.o. female.   Irritable Bowel Syndrome (4 week follow up)    History of Present Illness   Presents for a recheck on chronic nausea, gastroparesis, and IBS with predominant constipation    IBS with predominant constipation with intermittent diarrhea  Previously failed bentyl for antispasmodic  egd done with normal results, could not tolerate prep for screening colonoscopy  Has previous abdominal surgery with cholecystectomy and  section  Has failed a diet high in fruits and vegetables  Started on anaspaz PRN for cramping and diarrhea - this has improved abdominal pain  Failed linzess - caused her nausea, vomiting  Failed amitiza - caused worsening nausea    Delayed gastric empyting - confirmed by gastric study  Patient previously failed reglan - caused muscle twitching in the neck  Failed compazine - caused pruritus    Chronic nausea - remains uncontrolled  Patient complaining of early satiety, nausea, abdominal bloating and fullness  Failed zofran - caused constipation  Failed compazine - causes pruritus  Failed phenergan - causes drowsiness    GERD - improved with zantac  Failed prilosec, protonix, prevacid, nexium    b12 deficiency - improvement of b12 value from January  Level has improved from 288 to 432  Will continue monthly injections and then plan to switch to oral supplement    Sinus tachycardia - followed by Dr Ospina  Cannot titrate metoprolol due to symptomatic hypotension  Tilt table test ordered  Recommended high salt diet, compression stockings    The following portions of the patient's history were reviewed and updated as appropriate: allergies, current medications, past family history, past medical history, past social history, past surgical history and problem list.    Review of Systems   Constitutional: Positive for appetite change. Negative for chills, fatigue  "and fever.   HENT: Negative for congestion, ear pain, rhinorrhea and sore throat.    Eyes: Negative for pain.   Respiratory: Negative for cough and shortness of breath.    Cardiovascular: Negative for chest pain and palpitations.   Gastrointestinal: Positive for nausea. Negative for abdominal pain, constipation and diarrhea.   Genitourinary: Negative for dysuria.   Musculoskeletal: Positive for arthralgias. Negative for back pain, joint swelling and neck pain.   Skin: Negative for rash.   Neurological: Negative for dizziness and headaches.       Objective   /72  Pulse 110  Temp 97.5 °F (36.4 °C)  Ht 59\" (149.9 cm)  Wt 107 lb (48.5 kg)  LMP 06/22/2017  SpO2 91%  BMI 21.61 kg/m2  Physical Exam   Constitutional: She is oriented to person, place, and time. She appears well-developed and well-nourished.   HENT:   Head: Normocephalic and atraumatic.   Eyes: Pupils are equal, round, and reactive to light.   Neck: Normal range of motion. Neck supple.   Cardiovascular: Normal rate, regular rhythm and normal heart sounds.    Pulmonary/Chest: Effort normal and breath sounds normal. No respiratory distress. She has no wheezes. She has no rales.   Abdominal: Soft. Bowel sounds are normal.   Musculoskeletal: Normal range of motion.   Neurological: She is alert and oriented to person, place, and time.   Skin: Skin is warm and dry.   Psychiatric: She has a normal mood and affect.   Nursing note and vitals reviewed.      Assessment/Plan   Problems Addressed this Visit        Cardiovascular and Mediastinum    Sinus tachycardia       Digestive    GERD (gastroesophageal reflux disease)    Relevant Orders    Ambulatory Referral to Gastroenterology    Irritable bowel syndrome with both constipation and diarrhea    Relevant Orders    Ambulatory Referral to Gastroenterology    Chronic nausea    Relevant Medications    Scopolamine (TRANSDERM-SCOP, 1.5 MG,) 1.5 MG/3DAYS patch    Other Relevant Orders    Ambulatory Referral to " Gastroenterology    B12 deficiency    Gastroparesis    Relevant Medications    Scopolamine (TRANSDERM-SCOP, 1.5 MG,) 1.5 MG/3DAYS patch    Other Relevant Orders    Ambulatory Referral to Gastroenterology      Other Visit Diagnoses     Multiple joint pain    -  Primary    Relevant Orders    Rheumatoid Factor    Sedimentation Rate    CHAGO    Uric acid    C-reactive protein        Discussed case with Milli Jefferson  Start scopolamine patches  Stop compazine  Referral to GI    b12 IM today    Labs ordered  Start mobic daily    Recheck in 4 weeks

## 2017-06-27 DIAGNOSIS — K31.84 GASTROPARESIS: ICD-10-CM

## 2017-06-27 DIAGNOSIS — R11.0 CHRONIC NAUSEA: Primary | ICD-10-CM

## 2017-06-27 DIAGNOSIS — K58.2 IRRITABLE BOWEL SYNDROME WITH BOTH CONSTIPATION AND DIARRHEA: ICD-10-CM

## 2017-06-27 DIAGNOSIS — K21.9 GASTROESOPHAGEAL REFLUX DISEASE WITHOUT ESOPHAGITIS: ICD-10-CM

## 2017-06-27 LAB — CRP SERPL-MCNC: <0.5 MG/DL (ref 0–1)

## 2017-06-28 ENCOUNTER — TELEPHONE (OUTPATIENT)
Dept: FAMILY MEDICINE CLINIC | Facility: CLINIC | Age: 22
End: 2017-06-28

## 2017-06-28 ENCOUNTER — OFFICE VISIT (OUTPATIENT)
Dept: GASTROENTEROLOGY | Facility: CLINIC | Age: 22
End: 2017-06-28

## 2017-06-28 VITALS
HEIGHT: 60 IN | HEART RATE: 105 BPM | DIASTOLIC BLOOD PRESSURE: 74 MMHG | BODY MASS INDEX: 20.89 KG/M2 | WEIGHT: 106.4 LBS | SYSTOLIC BLOOD PRESSURE: 108 MMHG

## 2017-06-28 DIAGNOSIS — K58.2 IRRITABLE BOWEL SYNDROME WITH BOTH CONSTIPATION AND DIARRHEA: Primary | ICD-10-CM

## 2017-06-28 DIAGNOSIS — K31.84 GASTROPARESIS: ICD-10-CM

## 2017-06-28 DIAGNOSIS — R11.0 NAUSEA: ICD-10-CM

## 2017-06-28 LAB
ANA SER QL: POSITIVE
DSDNA AB SER-ACNC: 4 IU/ML (ref 0–9)
Lab: NORMAL

## 2017-06-28 PROCEDURE — 99214 OFFICE O/P EST MOD 30 MIN: CPT | Performed by: NURSE PRACTITIONER

## 2017-06-28 NOTE — TELEPHONE ENCOUNTER
Called pt and told her of results. She said she will call me back and let me know which rhuematologist she would like to see. 6/28/17

## 2017-06-28 NOTE — TELEPHONE ENCOUNTER
----- Message from Beth Avila MD sent at 6/28/2017  4:17 PM CDT -----  Marcelle is positive.  Needs a referral to rheumatology.

## 2017-06-29 ENCOUNTER — OFFICE VISIT (OUTPATIENT)
Dept: OBSTETRICS AND GYNECOLOGY | Facility: CLINIC | Age: 22
End: 2017-06-29

## 2017-06-29 VITALS
BODY MASS INDEX: 20.62 KG/M2 | DIASTOLIC BLOOD PRESSURE: 82 MMHG | SYSTOLIC BLOOD PRESSURE: 114 MMHG | WEIGHT: 105 LBS | HEIGHT: 60 IN

## 2017-06-29 DIAGNOSIS — Z30.011 INITIATION OF ORAL CONTRACEPTION: Primary | ICD-10-CM

## 2017-06-29 PROCEDURE — 99202 OFFICE O/P NEW SF 15 MIN: CPT | Performed by: OBSTETRICS & GYNECOLOGY

## 2017-06-29 NOTE — PROGRESS NOTES
Subjective     Chief Complaint   Patient presents with   • Establish Care       Nai Golden is a 21 y.o.  with LMP of  presents today to establish care and to discuss different options for birth control.  Patient states she has been dealing with significant gastroparesis and has noticed that her symptoms are worse during the week of her period.  She is currently using the Nuvaring for birth control and feels like if she could just use it continuously she would be doing better with her GI symptoms.      She recently moved to the area in October and lives with her  and child.  She states she is also here to establish care today.  Had one pap early in her teen years, but no history of abnl paps or STDs.  Periods are regular, however, she is on the nuvaring.      Past Medical History:   Diagnosis Date   • Depression    • Dyspepsia    • GERD (gastroesophageal reflux disease)    • IBS (irritable bowel syndrome)    • Sinus tachycardia      Past Surgical History:   Procedure Laterality Date   •  SECTION     • CHOLECYSTECTOMY     • ENDOSCOPY  2016   • TONSILLECTOMY       Social History     Social History   • Marital status:      Spouse name: N/A   • Number of children: N/A   • Years of education: N/A     Occupational History   • Not on file.     Social History Main Topics   • Smoking status: Never Smoker   • Smokeless tobacco: Never Used   • Alcohol use No   • Drug use: No   • Sexual activity: Yes     Partners: Male      Comment: nuva ring for birth control     Other Topics Concern   • Not on file     Social History Narrative     Family History   Problem Relation Age of Onset   • Hypertension Mother    • Hypertension Father    • Arthritis Father    • Hyperlipidemia Father    • Heart disease Father    • No Known Problems Sister    • No Known Problems Brother      Review of Systems - comprehensive ROS preformed and all negative except for fatigue, nausea, vomiting, change in bowel  "habits, diarrhea, constipation, joint pain, headaches, and anxiety.     Objective      /82  Ht 59.5\" (151.1 cm)  Wt 105 lb (47.6 kg)  LMP 06/22/2017 (Exact Date)  BMI 20.85 kg/m2    Physical Exam  General:   Appears stated age, AAOx3, NAD   Heart: RRR no m/r/g   Lungs: CTAB   Abdomen: Soft, nttp, no masses palpated   Extremities: No CT or edema bilaterally    Neurologic: CN II - XII grossly intact     Assessment/Plan     ASSESSMENT  1. Initiation of oral contraception        PLAN  1. Initiation of OCPs  - Discussed different options that could be done to limit number of menstrual cycles - Depo, Nexplanon, IUD, or continuous OCPs.  Patient states she cannot tolerate \"foreign objects\" due to rejection so she declines Nexplanon and IUD.    - Does not want to do Depo, interested in continuous OCPs  - Discussed risk of OCPs are similar to Nuvaring.  Discussed risk of DVT, CVA, and development of HTN.  Patient willing to accept these risk   - Discussed compliance with taking pills daily and possibility of break through bleeding.   - RTC in 3 months for follow up.         New Medications Ordered This Visit   Medications   • levonorgestrel-ethinyl estradiol (ENPRESSE) per tablet     Sig: Take 1 tablet by mouth Daily.     Dispense:  84 tablet     Refill:  4       Shandra Enrique MD  6/29/2017           "

## 2017-06-30 ENCOUNTER — TELEPHONE (OUTPATIENT)
Dept: GASTROENTEROLOGY | Facility: CLINIC | Age: 22
End: 2017-06-30

## 2017-06-30 RX ORDER — DEXTROSE AND SODIUM CHLORIDE 5; .45 G/100ML; G/100ML
30 INJECTION, SOLUTION INTRAVENOUS CONTINUOUS PRN
Status: CANCELLED | OUTPATIENT
Start: 2017-08-02

## 2017-06-30 NOTE — TELEPHONE ENCOUNTER
----- Message from TERRA Oliver sent at 6/30/2017  9:23 AM CDT -----  Please let patient know Dr. Berman would like to repeat EGD. Nicole scheduled it below. Please also mail a copy of instruction after you speak to her on phone.   ----- Message -----     From: Nicole Heard     Sent: 6/30/2017   9:12 AM       To: TERRA Oliver, Eliana Wan MA    July 26 @1200       ----- Message -----     From: TERRA Oliver     Sent: 6/30/2017   8:55 AM       To: Nicole Heard    Please schedule patient for EGD. I put order in epic. Please send ray date so she can call patient with instructions. Thanks

## 2017-06-30 NOTE — PROGRESS NOTES
Chief Complaint   Patient presents with   • Nausea   • Heartburn     GERD   • Irritable Bowel Syndrome     with constipation and diarrhea       Subjective    Nai Golden is a 21 y.o. female. she is being seen for consultation today at the request of Beth Avila MD    History of Present Illness   Patient seen to discuss several years of nausea, reflux, I DS with constipation alternating with diarrhea and recent gastric emptying study which showed severely delayed gastric emptying time.  Patient states she could only tolerate about 12-24 ounces of liquid per day unable to eat.  Has tried all PPIs switching to worsen symptoms.  Hasn't tried Zofran which seems to worsen her constipation however recently was started on scopolamine patch which seemed to help nausea and she has been having a bowel movement daily since then.  Reports her weight dropped last summer and she was evaluated per Dr. Digna Lombardo in [facility] underwent EGD at that time and small bowel biopsy was not conclusive.  She was unable to complete colonoscopy tried to drink 2 different preps and vomited it.  Gastric emptying study was completed recently with delayed emptying time over 1000 minutes.  She also underwent CT enterography in  which noted no visible small bowel disease.  Plan; we'll repeat EGD with small bowel biopsy.  Refer patient to motility clinic due to severe gastroparesis unknown etiology.    The following portions of the patient's history were reviewed and updated as appropriate:   Past Medical History:   Diagnosis Date   • Depression    • Dyspepsia    • GERD (gastroesophageal reflux disease)    • IBS (irritable bowel syndrome)    • Sinus tachycardia      Past Surgical History:   Procedure Laterality Date   •  SECTION     • CHOLECYSTECTOMY     • ENDOSCOPY  2016   • TONSILLECTOMY       Family History   Problem Relation Age of Onset   • Hypertension Mother    • Hypertension Father    • Arthritis Father     • Hyperlipidemia Father    • Heart disease Father    • No Known Problems Sister    • No Known Problems Brother      OB History      Para Term  AB TAB SAB Ectopic Multiple Living    1         Current Outpatient Prescriptions   Medication Sig Dispense Refill   • hyoscyamine (ANASPAZ,LEVSIN) 0.125 MG tablet Take 1 tablet by mouth Every 4 (Four) Hours As Needed for Cramping or Diarrhea. 120 tablet 5   • meloxicam (MOBIC) 7.5 MG tablet Take 1 tablet by mouth Daily. 30 tablet 5   • raNITIdine (ZANTAC) 150 MG tablet Take 1 tablet by mouth 2 (Two) Times a Day. 60 tablet 5   • Scopolamine (TRANSDERM-SCOP, 1.5 MG,) 1.5 MG/3DAYS patch Place 1 patch on the skin Every 72 (Seventy-Two) Hours. 10 patch 0   • sertraline (ZOLOFT) 50 MG tablet Take 1 tablet by mouth Daily. 30 tablet 5   • levonorgestrel-ethinyl estradiol (ENPRESSE) per tablet Take 1 tablet by mouth Daily. 84 tablet 4     Current Facility-Administered Medications   Medication Dose Route Frequency Provider Last Rate Last Dose   • cyanocobalamin injection 1,000 mcg  1,000 mcg Intramuscular Q28 Days Beth Avila MD   1,000 mcg at 17 0852     Allergies   Allergen Reactions   • Percocet [Oxycodone-Acetaminophen]      Social History     Social History   • Marital status:      Spouse name: N/A   • Number of children: N/A   • Years of education: N/A     Social History Main Topics   • Smoking status: Never Smoker   • Smokeless tobacco: Never Used   • Alcohol use No   • Drug use: No   • Sexual activity: Yes     Partners: Male      Comment: nuva ring for birth control     Other Topics Concern   • None     Social History Narrative       Review of Systems  Review of Systems   Constitutional: Negative for activity change, appetite change, chills, diaphoresis, fatigue, fever and unexpected weight change.   HENT: Negative for sore throat and trouble swallowing.    Respiratory: Negative for shortness of breath.    Gastrointestinal:  "Positive for abdominal pain, constipation, nausea and vomiting. Negative for abdominal distention, anal bleeding, blood in stool, diarrhea and rectal pain.   Musculoskeletal: Negative for arthralgias.   Skin: Negative for pallor.   Neurological: Negative for light-headedness.        /74  Pulse 105  Ht 59.5\" (151.1 cm)  Wt 106 lb 6.4 oz (48.3 kg)  LMP 06/22/2017  BMI 21.13 kg/m2    Objective    Physical Exam   Constitutional: She is oriented to person, place, and time. She appears well-developed and well-nourished. She is cooperative. No distress.   HENT:   Head: Normocephalic and atraumatic.   Neck: Normal range of motion. Neck supple. No thyromegaly present.   Cardiovascular: Normal rate, regular rhythm and normal heart sounds.    Pulmonary/Chest: Effort normal and breath sounds normal. She has no wheezes. She has no rhonchi. She has no rales.   Abdominal: Soft. Normal appearance and bowel sounds are normal. She exhibits no shifting dullness and no distension. There is no hepatosplenomegaly. There is no tenderness. There is no rigidity and no guarding. No hernia.   Lymphadenopathy:     She has no cervical adenopathy.   Neurological: She is alert and oriented to person, place, and time.   Skin: Skin is warm, dry and intact. No rash noted. No pallor.   Psychiatric: She has a normal mood and affect. Her speech is normal.     Lab on 06/26/2017   Component Date Value Ref Range Status   • CHAGO Direct 06/26/2017 Positive* Negative Final   • Uric Acid 06/26/2017 3.6  2.6 - 7.2 mg/dL Final   • C-Reactive Protein 06/26/2017 <0.50  0.00 - 1.00 mg/dL Final   • Anti-DNA (DS) Ab Qn 06/26/2017 4  0 - 9 IU/mL Final                                       Negative      <5                                     Equivocal  5 - 9                                     Positive      >9   • See below: 06/26/2017 Comment   Final    Comment: Autoantibody                       Disease " Association  ------------------------------------------------------------                          Condition                  Frequency  ---------------------   ------------------------   ---------  Antinuclear Antibody,    SLE, mixed connective  Direct (CHAGO-D)           tissue diseases  ---------------------   ------------------------   ---------  dsDNA                    SLE                        40 - 60%  ---------------------   ------------------------   ---------  Chromatin                Drug induced SLE                90%                           SLE                        48 - 97%  ---------------------   ------------------------   ---------  SSA (Ro)                 SLE                        25 - 35%                           Sjogren's Syndrome         40 - 70%                            Lupus                 100%  ---------------------   ------------------------   ---------  SSB (La)                 SLE                                                        10%                           Sjogren's Syndrome              30%  ---------------------   -----------------------    ---------  Sm (anti-Smith)          SLE                        15 - 30%  ---------------------   -----------------------    ---------  RNP                      Mixed Connective Tissue                           Disease                         95%  (U1 nRNP,                SLE                        30 - 50%  anti-ribonucleoprotein)  Polymyositis and/or                           Dermatomyositis                 20%  ---------------------   ------------------------   ---------  Scl-70 (antiDNA          Scleroderma (diffuse)      20 - 35%  topoisomerase)           Crest                           13%  ---------------------   ------------------------   ---------  Gladys-1                     Polymyositis and/or                           Dermatomyositis            20 - 40%  ---------------------   ------------------------    ---------  Centromere B             Scleroderma -                            Crest                           variant                         80%     Assessment/Plan      1. Irritable bowel syndrome with both constipation and diarrhea    2. Gastroparesis    3. Nausea    .     Orders placed during this encounter include:    ESOPHAGOGASTRODUODENOSCOPY possible dilation  (N/A)    Review and/or summary of lab tests, radiology, procedures, medications. Review and summary of old records and obtaining of history. The risks and benefits of my recommendations, as well as other treatment options were discussed with the patient today. Questions were answered.      Follow-up: Return in about 4 weeks (around 7/26/2017).          This document has been electronically signed by TERRA Rios on June 30, 2017 8:56 AM             Results for orders placed or performed in visit on 06/26/17   DNA / DS   Result Value Ref Range    Anti-DNA (DS) Ab Qn 4 0 - 9 IU/mL    See below: Comment    C-reactive protein   Result Value Ref Range    C-Reactive Protein <0.50 0.00 - 1.00 mg/dL   CHAGO   Result Value Ref Range    CHAGO Direct Positive (A) Negative   Uric acid   Result Value Ref Range    Uric Acid 3.6 2.6 - 7.2 mg/dL   Results for orders placed or performed in visit on 06/26/17   Sedimentation Rate   Result Value Ref Range    Sed Rate 2 0 - 20 mm/hr   Rheumatoid Factor   Result Value Ref Range    Rheumatoid Factor Qualitative Negative Negative   Results for orders placed or performed in visit on 04/14/17   Vitamin B12 & Folate   Result Value Ref Range    Folate 9.31 2.76 - 21.00 ng/mL    Vitamin B-12 432 239 - 931 pg/mL   Results for orders placed or performed in visit on 04/13/17   Adult Transthoracic Echo Complete   Result Value Ref Range    BSA 1.4 m^2    IVSd 0.74 cm    IVSs 0.74 cm    LVIDd 4.1 cm    LVIDs 2.9 cm    LVPWd 0.82 cm    BH CV ECHO VEGA - LVPWS 1.1 cm    IVS/LVPW 0.91     FS 30.9 %    EDV(Teich) 75.9 ml    ESV(Teich) 31.1  ml    EF(Teich) 59.0 %    EDV(cubed) 71.0 ml    ESV(cubed) 23.4 ml    EF(cubed) 67.0 %    % IVS thick 0 %    % LVPW thick 34.5 %    LV mass(C)d 95.6 grams    LV mass(C)dI 67.6 grams/m^2    LV mass(C)s 67.6 grams    LV mass(C)sI 47.8 grams/m^2    SV(Teich) 44.8 ml    SI(Teich) 31.7 ml/m^2    SV(cubed) 47.6 ml    SI(cubed) 33.6 ml/m^2    Ao root diam 2.2 cm    Ao root area 3.6 cm^2    ACS 1.8 cm    LA dimension 2.8 cm    LA/Ao 1.3     LVOT diam 1.2 cm    LVOT area 1.0 cm^2    LVOT area(traced) 1.1 cm^2    LVLd ap4 7.2 cm    EDV(MOD-sp4) 117.0 ml    LVLs ap4 6.0 cm    ESV(MOD-sp4) 47.6 ml    EF(MOD-sp4) 59.3 %    SV(MOD-sp4) 69.4 ml    SI(MOD-sp4) 49.1 ml/m^2    Ao root area (BSA corrected) 1.5     CONTRAST EF 4CH 59.3 ml/m^2    LV Diastolic corrected for BSA 82.8 ml/m^2    LV Systolic corrected for BSA 33.7 ml/m^2    MV E max link 86.3 cm/sec    MV A max link 67.9 cm/sec    MV E/A 1.3     MV dec slope 476.0 cm/sec^2    Ao pk link 112.0 cm/sec    Ao max PG 5.0 mmHg    Ao max PG (full) 1.5 mmHg    Ao V2 mean 76.4 cm/sec    Ao mean PG 3.0 mmHg    Ao mean PG (full) 1.0 mmHg    Ao V2 VTI 20.3 cm    JOCE(I,A) 0.8 cm^2    JOCE(I,D) 0.8 cm^2    JOCE(V,A) 0.87 cm^2    JOCE(V,D) 0.87 cm^2    LV V1 max PG 3.5 mmHg    LV V1 mean PG 2.0 mmHg    LV V1 max 93.8 cm/sec    LV V1 mean 63.3 cm/sec    LV V1 VTI 15.6 cm    MR max link 299.0 cm/sec    MR max PG 35.8 mmHg    SV(Ao) 73.7 ml    SI(Ao) 52.1 ml/m^2    SV(LVOT) 16.2 ml    SI(LVOT) 11.5 ml/m^2    PA V2 max 101.4 cm/sec    PA max PG 4.1 mmHg    PA V2 mean 58.0 cm/sec    PA mean PG 2.0 mmHg    PA V2 VTI 19.0 cm    PA acc slope 267.0 cm/sec^2    PA acc time 0.2 sec    RAP systole 10.0 mmHg    PA pr(Accel) -12.4 mmHg    Pulm Sys Link 48.5 cm/sec    Pulm Baptiste Link 47.5 cm/sec    Pulm S/D 1.0      CV ECHO VEGA - BZI_BMI 21.6 kilograms/m^2     CV ECHO VEGA - BSA(HAYCOCK) 1.4 m^2     CV ECHO VEGA - BZI_METRIC_WEIGHT 48.5 kg     CV ECHO VEGA - BZI_METRIC_HEIGHT 149.9 cm   Results for orders  placed or performed in visit on 03/15/17   Urinalysis, Microscopic Only   Result Value Ref Range    RBC, UA 0-2 (A) None Seen /HPF    WBC, UA None Seen None Seen /HPF    Bacteria, UA None Seen None Seen /HPF    Squamous Epithelial Cells, UA 3-6 (A) None Seen, 0-2 /HPF    Hyaline Casts, UA None Seen None Seen /LPF    Methodology Manual Light Microscopy      *Note: Due to a large number of results and/or encounters for the requested time period, some results have not been displayed. A complete set of results can be found in Results Review.

## 2017-06-30 NOTE — TELEPHONE ENCOUNTER
Spoke with patient per Milli ELLISON. I notified patient that Dr. Berman would like for her to repeat her EGD and that we have it scheduled for July 26 , 2017 at 12:00 pm. I then proceeded to go over the instructions. Patient then asked if she would need to take off her transderm patch for her procedure. I told patient when pre-op calls she might want to ask them to be sure. I also assured patient that I would send her a copy of the instructions that way she can go over them as well. Patient had no further questions at the moment.

## 2017-07-06 DIAGNOSIS — R42 DIZZINESS: Primary | ICD-10-CM

## 2017-07-10 DIAGNOSIS — M25.50 MULTIPLE JOINT PAIN: Primary | ICD-10-CM

## 2017-08-02 ENCOUNTER — ANESTHESIA (OUTPATIENT)
Dept: GASTROENTEROLOGY | Facility: HOSPITAL | Age: 22
End: 2017-08-02

## 2017-08-02 ENCOUNTER — ANESTHESIA EVENT (OUTPATIENT)
Dept: GASTROENTEROLOGY | Facility: HOSPITAL | Age: 22
End: 2017-08-02

## 2017-08-02 ENCOUNTER — HOSPITAL ENCOUNTER (OUTPATIENT)
Facility: HOSPITAL | Age: 22
Setting detail: HOSPITAL OUTPATIENT SURGERY
Discharge: HOME | End: 2017-08-02
Attending: INTERNAL MEDICINE | Admitting: INTERNAL MEDICINE

## 2017-08-02 VITALS
RESPIRATION RATE: 18 BRPM | HEIGHT: 60 IN | OXYGEN SATURATION: 97 % | BODY MASS INDEX: 19.74 KG/M2 | HEART RATE: 108 BPM | WEIGHT: 100.53 LBS | DIASTOLIC BLOOD PRESSURE: 51 MMHG | SYSTOLIC BLOOD PRESSURE: 98 MMHG | TEMPERATURE: 97.4 F

## 2017-08-02 DIAGNOSIS — R11.0 NAUSEA: ICD-10-CM

## 2017-08-02 DIAGNOSIS — K31.84 GASTROPARESIS: ICD-10-CM

## 2017-08-02 DIAGNOSIS — K58.2 IRRITABLE BOWEL SYNDROME WITH BOTH CONSTIPATION AND DIARRHEA: ICD-10-CM

## 2017-08-02 LAB — B-HCG UR QL: NEGATIVE

## 2017-08-02 PROCEDURE — 25010000002 PROPOFOL 10 MG/ML EMULSION: Performed by: NURSE ANESTHETIST, CERTIFIED REGISTERED

## 2017-08-02 PROCEDURE — 88305 TISSUE EXAM BY PATHOLOGIST: CPT | Performed by: INTERNAL MEDICINE

## 2017-08-02 PROCEDURE — 88305 TISSUE EXAM BY PATHOLOGIST: CPT | Performed by: PATHOLOGY

## 2017-08-02 PROCEDURE — 81025 URINE PREGNANCY TEST: CPT | Performed by: INTERNAL MEDICINE

## 2017-08-02 PROCEDURE — 43239 EGD BIOPSY SINGLE/MULTIPLE: CPT | Performed by: INTERNAL MEDICINE

## 2017-08-02 PROCEDURE — 25010000002 MIDAZOLAM PER 1 MG: Performed by: NURSE ANESTHETIST, CERTIFIED REGISTERED

## 2017-08-02 RX ORDER — LIDOCAINE HYDROCHLORIDE 10 MG/ML
INJECTION, SOLUTION INFILTRATION; PERINEURAL AS NEEDED
Status: DISCONTINUED | OUTPATIENT
Start: 2017-08-02 | End: 2017-08-02 | Stop reason: SURG

## 2017-08-02 RX ORDER — PROMETHAZINE HYDROCHLORIDE 25 MG/ML
12.5 INJECTION, SOLUTION INTRAMUSCULAR; INTRAVENOUS ONCE AS NEEDED
Status: DISCONTINUED | OUTPATIENT
Start: 2017-08-02 | End: 2017-08-02 | Stop reason: HOSPADM

## 2017-08-02 RX ORDER — DEXAMETHASONE SODIUM PHOSPHATE 4 MG/ML
8 INJECTION, SOLUTION INTRA-ARTICULAR; INTRALESIONAL; INTRAMUSCULAR; INTRAVENOUS; SOFT TISSUE ONCE AS NEEDED
Status: DISCONTINUED | OUTPATIENT
Start: 2017-08-02 | End: 2017-08-02 | Stop reason: HOSPADM

## 2017-08-02 RX ORDER — ONDANSETRON 2 MG/ML
4 INJECTION INTRAMUSCULAR; INTRAVENOUS ONCE AS NEEDED
Status: DISCONTINUED | OUTPATIENT
Start: 2017-08-02 | End: 2017-08-02 | Stop reason: HOSPADM

## 2017-08-02 RX ORDER — PROMETHAZINE HYDROCHLORIDE 25 MG/1
25 TABLET ORAL ONCE AS NEEDED
Status: DISCONTINUED | OUTPATIENT
Start: 2017-08-02 | End: 2017-08-02 | Stop reason: HOSPADM

## 2017-08-02 RX ORDER — DEXTROSE AND SODIUM CHLORIDE 5; .45 G/100ML; G/100ML
30 INJECTION, SOLUTION INTRAVENOUS CONTINUOUS PRN
Status: DISCONTINUED | OUTPATIENT
Start: 2017-08-02 | End: 2017-08-02 | Stop reason: HOSPADM

## 2017-08-02 RX ORDER — MIDAZOLAM HYDROCHLORIDE 1 MG/ML
INJECTION INTRAMUSCULAR; INTRAVENOUS AS NEEDED
Status: DISCONTINUED | OUTPATIENT
Start: 2017-08-02 | End: 2017-08-02 | Stop reason: SURG

## 2017-08-02 RX ORDER — PROPOFOL 10 MG/ML
VIAL (ML) INTRAVENOUS AS NEEDED
Status: DISCONTINUED | OUTPATIENT
Start: 2017-08-02 | End: 2017-08-02 | Stop reason: SURG

## 2017-08-02 RX ORDER — PROMETHAZINE HYDROCHLORIDE 25 MG/1
25 TABLET ORAL EVERY 6 HOURS PRN
COMMUNITY
End: 2018-04-25 | Stop reason: HOSPADM

## 2017-08-02 RX ORDER — PROMETHAZINE HYDROCHLORIDE 25 MG/1
25 SUPPOSITORY RECTAL ONCE AS NEEDED
Status: DISCONTINUED | OUTPATIENT
Start: 2017-08-02 | End: 2017-08-02 | Stop reason: HOSPADM

## 2017-08-02 RX ADMIN — PROPOFOL 50 MG: 10 INJECTION, EMULSION INTRAVENOUS at 09:51

## 2017-08-02 RX ADMIN — LIDOCAINE HYDROCHLORIDE 50 MG: 10 INJECTION, SOLUTION INFILTRATION; PERINEURAL at 09:43

## 2017-08-02 RX ADMIN — PROPOFOL 100 MG: 10 INJECTION, EMULSION INTRAVENOUS at 09:43

## 2017-08-02 RX ADMIN — DEXTROSE AND SODIUM CHLORIDE 30 ML/HR: 5; 450 INJECTION, SOLUTION INTRAVENOUS at 09:11

## 2017-08-02 RX ADMIN — MIDAZOLAM 2 MG: 1 INJECTION INTRAMUSCULAR; INTRAVENOUS at 09:43

## 2017-08-02 NOTE — ANESTHESIA PREPROCEDURE EVALUATION
Anesthesia Evaluation     NPO Solid Status: > 2 hours  NPO Liquid Status: > 2 hours     Airway   Mallampati: II  Neck ROM: full  no difficulty expected  Dental      Pulmonary - normal exam   Cardiovascular     Rhythm: regular  Rate: normal        Neuro/Psych  GI/Hepatic/Renal/Endo      Musculoskeletal     Abdominal    Substance History      OB/GYN          Other                                        Anesthesia Plan    ASA 2     MAC     intravenous induction

## 2017-08-02 NOTE — PLAN OF CARE
Problem: Patient Care Overview (Adult)  Goal: Plan of Care Review    08/02/17 1009   Coping/Psychosocial Response Interventions   Plan Of Care Reviewed With patient;family   Patient Care Overview   Progress no change         Problem: GI Endoscopy (Adult)  Goal: Signs and Symptoms of Listed Potential Problems Will be Absent or Manageable (GI Endoscopy)    08/02/17 1009   GI Endoscopy   Problems Assessed (GI Endoscopy) all   Problems Present (GI Endoscopy) none

## 2017-08-02 NOTE — PLAN OF CARE
Problem: Patient Care Overview (Adult)  Goal: Plan of Care Review    08/02/17 1001   Coping/Psychosocial Response Interventions   Plan Of Care Reviewed With patient   Patient Care Overview   Progress no change   Outcome Evaluation   Outcome Summary/Follow up Plan vss         Problem: GI Endoscopy (Adult)  Goal: Signs and Symptoms of Listed Potential Problems Will be Absent or Manageable (GI Endoscopy)    08/02/17 1001   GI Endoscopy   Problems Assessed (GI Endoscopy) all   Problems Present (GI Endoscopy) none

## 2017-08-02 NOTE — H&P
Progress Notes  Encounter Date: 8/2/2017  Franky baldwin  Gastroenterology   Expand All Collapse All    []Hide copied text  []Hover for attribution information       Chief Complaint   Patient presents with   • Nausea   • Heartburn       GERD   • Irritable Bowel Syndrome       with constipation and diarrhea            Subjective        Nai Golden is a 21 y.o. female. she is being seen for consultation today at the request of Beth Avila MD     History of Present Illness   Patient seen to discuss several years of nausea, reflux, I DS with constipation alternating with diarrhea and recent gastric emptying study which showed severely delayed gastric emptying time.  Patient states she could only tolerate about 12-24 ounces of liquid per day unable to eat.  Has tried all PPIs switching to worsen symptoms.  Hasn't tried Zofran which seems to worsen her constipation however recently was started on scopolamine patch which seemed to help nausea and she has been having a bowel movement daily since then.  Reports her weight dropped last summer and she was evaluated per Dr. Digna Lombardo in [facility] underwent EGD at that time and small bowel biopsy was not conclusive.  She was unable to complete colonoscopy tried to drink 2 different preps and vomited it.  Gastric emptying study was completed recently with delayed emptying time over 1000 minutes.  She also underwent CT enterography in 2016 which noted no visible small bowel disease.  Plan; we'll repeat EGD with small bowel biopsy.  Refer patient to motility clinic due to severe gastroparesis unknown etiology.     The following portions of the patient's history were reviewed and updated as appropriate:    Medical History         Past Medical History:   Diagnosis Date   • Depression     • Dyspepsia     • GERD (gastroesophageal reflux disease)     • IBS (irritable bowel syndrome)     • Sinus tachycardia            Surgical History          Past Surgical History:    Procedure Laterality Date   •  SECTION       • CHOLECYSTECTOMY       • ENDOSCOPY   2016   • TONSILLECTOMY                   Family History   Problem Relation Age of Onset   • Hypertension Mother     • Hypertension Father     • Arthritis Father     • Hyperlipidemia Father     • Heart disease Father     • No Known Problems Sister     • No Known Problems Brother        OB History      Para Term  AB TAB SAB Ectopic Multiple Living     1 1 1             1          Current Medications           Current Outpatient Prescriptions   Medication Sig Dispense Refill   • hyoscyamine (ANASPAZ,LEVSIN) 0.125 MG tablet Take 1 tablet by mouth Every 4 (Four) Hours As Needed for Cramping or Diarrhea. 120 tablet 5   • meloxicam (MOBIC) 7.5 MG tablet Take 1 tablet by mouth Daily. 30 tablet 5   • raNITIdine (ZANTAC) 150 MG tablet Take 1 tablet by mouth 2 (Two) Times a Day. 60 tablet 5   • Scopolamine (TRANSDERM-SCOP, 1.5 MG,) 1.5 MG/3DAYS patch Place 1 patch on the skin Every 72 (Seventy-Two) Hours. 10 patch 0   • sertraline (ZOLOFT) 50 MG tablet Take 1 tablet by mouth Daily. 30 tablet 5   • levonorgestrel-ethinyl estradiol (ENPRESSE) per tablet Take 1 tablet by mouth Daily. 84 tablet 4                Current Facility-Administered Medications   Medication Dose Route Frequency Provider Last Rate Last Dose   • cyanocobalamin injection 1,000 mcg  1,000 mcg Intramuscular Q28 Days Beth Avila MD    1,000 mcg at 17 0852              Allergies   Allergen Reactions   • Percocet [Oxycodone-Acetaminophen]         Social History    Social History            Social History   • Marital status:        Spouse name: N/A   • Number of children: N/A   • Years of education: N/A             Social History Main Topics   • Smoking status: Never Smoker   • Smokeless tobacco: Never Used   • Alcohol use No   • Drug use: No   • Sexual activity: Yes       Partners: Male         Comment: nuva ring for birth control  "          Other Topics Concern   • None      Social History Narrative            Review of Systems  Review of Systems   Constitutional: Negative for activity change, appetite change, chills, diaphoresis, fatigue, fever and unexpected weight change.   HENT: Negative for sore throat and trouble swallowing.    Respiratory: Negative for shortness of breath.    Gastrointestinal: Positive for abdominal pain, constipation, nausea and vomiting. Negative for abdominal distention, anal bleeding, blood in stool, diarrhea and rectal pain.   Musculoskeletal: Negative for arthralgias.   Skin: Negative for pallor.   Neurological: Negative for light-headedness.                              /74  Pulse 105  Ht 59.5\" (151.1 cm)  Wt 106 lb 6.4 oz (48.3 kg)  LMP 06/22/2017  BMI 21.13 kg/m2        Objective        Physical Exam   Constitutional: She is oriented to person, place, and time. She appears well-developed and well-nourished. She is cooperative. No distress.   HENT:   Head: Normocephalic and atraumatic.   Neck: Normal range of motion. Neck supple. No thyromegaly present.   Cardiovascular: Normal rate, regular rhythm and normal heart sounds.    Pulmonary/Chest: Effort normal and breath sounds normal. She has no wheezes. She has no rhonchi. She has no rales.   Abdominal: Soft. Normal appearance and bowel sounds are normal. She exhibits no shifting dullness and no distension. There is no hepatosplenomegaly. There is no tenderness. There is no rigidity and no guarding. No hernia.   Lymphadenopathy:     She has no cervical adenopathy.   Neurological: She is alert and oriented to person, place, and time.   Skin: Skin is warm, dry and intact. No rash noted. No pallor.   Psychiatric: She has a normal mood and affect. Her speech is normal.              Lab on 06/26/2017   Component Date Value Ref Range Status   • CHAGO Direct 06/26/2017 Positive* Negative Final   • Uric Acid 06/26/2017 3.6  2.6 - 7.2 mg/dL Final   • C-Reactive " Protein 2017 <0.50  0.00 - 1.00 mg/dL Final   • Anti-DNA (DS) Ab Qn 2017 4  0 - 9 IU/mL Final                                        Negative      <5                                     Equivocal  5 - 9                                     Positive      >9   • See below: 2017 Comment    Final     Comment: Autoantibody                       Disease Association  ------------------------------------------------------------                          Condition                  Frequency  ---------------------   ------------------------   ---------  Antinuclear Antibody,    SLE, mixed connective  Direct (CHAGO-D)           tissue diseases  ---------------------   ------------------------   ---------  dsDNA                    SLE                        40 - 60%  ---------------------   ------------------------   ---------  Chromatin                Drug induced SLE                90%                           SLE                        48 - 97%  ---------------------   ------------------------   ---------  SSA (Ro)                 SLE                        25 - 35%                           Sjogren's Syndrome         40 - 70%                            Lupus                 100%  ---------------------   ------------------------   ---------  SSB (La)                 SLE                                                         10%                           Sjogren's Syndrome              30%  ---------------------   -----------------------    ---------  Sm (anti-Smith)          SLE                        15 - 30%  ---------------------   -----------------------    ---------  RNP                      Mixed Connective Tissue                           Disease                         95%  (U1 nRNP,                SLE                        30 - 50%  anti-ribonucleoprotein)  Polymyositis and/or                           Dermatomyositis                 20%  ---------------------   ------------------------    ---------  Scl-70 (antiDNA          Scleroderma (diffuse)      20 - 35%  topoisomerase)           Crest                           13%  ---------------------   ------------------------   ---------  Gladys-1                     Polymyositis and/or                           Dermatomyositis            20 - 40%  ---------------------   ------------------------   ---------  Centromere B             Scleroderma -                             Crest                           variant                         80%         Assessment/Plan           1. Irritable bowel syndrome with both constipation and diarrhea    2. Gastroparesis    3. Nausea    .      Orders placed during this encounter include:     ESOPHAGOGASTRODUODENOSCOPY possible dilation  (N/A)     Review and/or summary of lab tests, radiology, procedures, medications. Review and summary of old records and obtaining of history. The risks and benefits of my recommendations, as well as other treatment options were discussed with the patient today. Questions were answered.        Follow-up: Return in about 4 weeks (around 7/26/2017).             This document has been electronically signed by TERRA Rios on June 30, 2017 8:56 AM                      Results for orders placed or performed in visit on 06/26/17   DNA / DS   Result Value Ref Range     Anti-DNA (DS) Ab Qn 4 0 - 9 IU/mL     See below: Comment     C-reactive protein   Result Value Ref Range     C-Reactive Protein <0.50 0.00 - 1.00 mg/dL   CHAGO   Result Value Ref Range     CHAGO Direct Positive (A) Negative   Uric acid   Result Value Ref Range     Uric Acid 3.6 2.6 - 7.2 mg/dL   Results for orders placed or performed in visit on 06/26/17   Sedimentation Rate   Result Value Ref Range     Sed Rate 2 0 - 20 mm/hr   Rheumatoid Factor   Result Value Ref Range     Rheumatoid Factor Qualitative Negative Negative   Results for orders placed or performed in visit on 04/14/17   Vitamin B12 & Folate   Result Value Ref Range      Folate 9.31 2.76 - 21.00 ng/mL     Vitamin B-12 432 239 - 931 pg/mL   Results for orders placed or performed in visit on 04/13/17   Adult Transthoracic Echo Complete   Result Value Ref Range     BSA 1.4 m^2     IVSd 0.74 cm     IVSs 0.74 cm     LVIDd 4.1 cm     LVIDs 2.9 cm     LVPWd 0.82 cm     BH CV ECHO VEGA - LVPWS 1.1 cm     IVS/LVPW 0.91       FS 30.9 %     EDV(Teich) 75.9 ml     ESV(Teich) 31.1 ml     EF(Teich) 59.0 %     EDV(cubed) 71.0 ml     ESV(cubed) 23.4 ml     EF(cubed) 67.0 %     % IVS thick 0 %     % LVPW thick 34.5 %     LV mass(C)d 95.6 grams     LV mass(C)dI 67.6 grams/m^2     LV mass(C)s 67.6 grams     LV mass(C)sI 47.8 grams/m^2     SV(Teich) 44.8 ml     SI(Teich) 31.7 ml/m^2     SV(cubed) 47.6 ml     SI(cubed) 33.6 ml/m^2     Ao root diam 2.2 cm     Ao root area 3.6 cm^2     ACS 1.8 cm     LA dimension 2.8 cm     LA/Ao 1.3       LVOT diam 1.2 cm     LVOT area 1.0 cm^2     LVOT area(traced) 1.1 cm^2     LVLd ap4 7.2 cm     EDV(MOD-sp4) 117.0 ml     LVLs ap4 6.0 cm     ESV(MOD-sp4) 47.6 ml     EF(MOD-sp4) 59.3 %     SV(MOD-sp4) 69.4 ml     SI(MOD-sp4) 49.1 ml/m^2     Ao root area (BSA corrected) 1.5       CONTRAST EF 4CH 59.3 ml/m^2     LV Diastolic corrected for BSA 82.8 ml/m^2     LV Systolic corrected for BSA 33.7 ml/m^2     MV E max jhony 86.3 cm/sec     MV A max jhony 67.9 cm/sec     MV E/A 1.3       MV dec slope 476.0 cm/sec^2     Ao pk jhony 112.0 cm/sec     Ao max PG 5.0 mmHg     Ao max PG (full) 1.5 mmHg     Ao V2 mean 76.4 cm/sec     Ao mean PG 3.0 mmHg     Ao mean PG (full) 1.0 mmHg     Ao V2 VTI 20.3 cm     JOCE(I,A) 0.8 cm^2     JOCE(I,D) 0.8 cm^2     JOCE(V,A) 0.87 cm^2     JOCE(V,D) 0.87 cm^2     LV V1 max PG 3.5 mmHg     LV V1 mean PG 2.0 mmHg     LV V1 max 93.8 cm/sec     LV V1 mean 63.3 cm/sec     LV V1 VTI 15.6 cm     MR max jhony 299.0 cm/sec     MR max PG 35.8 mmHg     SV(Ao) 73.7 ml     SI(Ao) 52.1 ml/m^2     SV(LVOT) 16.2 ml     SI(LVOT) 11.5 ml/m^2     PA V2 max 101.4 cm/sec     PA  max PG 4.1 mmHg     PA V2 mean 58.0 cm/sec     PA mean PG 2.0 mmHg     PA V2 VTI 19.0 cm     PA acc slope 267.0 cm/sec^2     PA acc time 0.2 sec     RAP systole 10.0 mmHg     PA pr(Accel) -12.4 mmHg     Pulm Sys Link 48.5 cm/sec     Pulm Baptiste Link 47.5 cm/sec     Pulm S/D 1.0       BH CV ECHO VEGA - BZI_BMI 21.6 kilograms/m^2      CV ECHO VEGA - BSA(HAYCOCK) 1.4 m^2      CV ECHO VEGA - BZI_METRIC_WEIGHT 48.5 kg      CV ECHO VEGA - BZI_METRIC_HEIGHT 149.9 cm   Results for orders placed or performed in visit on 03/15/17   Urinalysis, Microscopic Only   Result Value Ref Range     RBC, UA 0-2 (A) None Seen /HPF     WBC, UA None Seen None Seen /HPF     Bacteria, UA None Seen None Seen /HPF     Squamous Epithelial Cells, UA 3-6 (A) None Seen, 0-2 /HPF     Hyaline Casts, UA None Seen None Seen /LPF     Methodology Manual Light Microscopy        *Note: Due to a large number of results and/or encounters for the requested time period, some results have not been displayed. A complete set of results can be found in Results Review.               Office Visit on 6/28/2017              Detailed Report

## 2017-08-02 NOTE — ANESTHESIA POSTPROCEDURE EVALUATION
Patient: Nai Golden    Procedure Summary     Date Anesthesia Start Anesthesia Stop Room / Location    08/02/17 0943 1000 [facility] ENDOSCOPY 1 / [facility] ENDOSCOPY       Procedure Diagnosis Surgeon Provider    ESOPHAGOGASTRODUODENOSCOPY possible dilation  (N/A Esophagus) Gastroparesis; Nausea; Irritable bowel syndrome with both constipation and diarrhea  (Gastroparesis [K31.84]; Nausea [R11.0]; Irritable bowel syndrome with both constipation and diarrhea [K58.2]) MD Joaquín Pickens CRNA          Anesthesia Type: MAC  Last vitals  BP        Temp        Pulse      Resp        SpO2          Post Anesthesia Care and Evaluation    Patient location during evaluation: bedside  Patient participation: complete - patient cannot participate  Level of consciousness: awake  Pain score: 0  Pain management: adequate  Airway patency: patent  Anesthetic complications: No anesthetic complications  PONV Status: none  Cardiovascular status: acceptable  Respiratory status: acceptable  Hydration status: acceptable

## 2017-08-03 ENCOUNTER — DOCUMENTATION (OUTPATIENT)
Dept: CARDIOLOGY | Facility: CLINIC | Age: 22
End: 2017-08-03

## 2017-08-03 ENCOUNTER — OFFICE VISIT (OUTPATIENT)
Dept: FAMILY MEDICINE CLINIC | Age: 22
End: 2017-08-03
Payer: COMMERCIAL

## 2017-08-03 VITALS
DIASTOLIC BLOOD PRESSURE: 80 MMHG | WEIGHT: 102 LBS | HEIGHT: 59 IN | BODY MASS INDEX: 20.56 KG/M2 | SYSTOLIC BLOOD PRESSURE: 100 MMHG

## 2017-08-03 DIAGNOSIS — E46 MALNUTRITION (HCC): ICD-10-CM

## 2017-08-03 DIAGNOSIS — R63.4 WEIGHT LOSS: ICD-10-CM

## 2017-08-03 DIAGNOSIS — R63.8 DEHYDRATION SYMPTOMS: ICD-10-CM

## 2017-08-03 DIAGNOSIS — E53.8 B12 DEFICIENCY: ICD-10-CM

## 2017-08-03 DIAGNOSIS — K31.84 GASTROPARESIS: Primary | ICD-10-CM

## 2017-08-03 LAB
ALBUMIN SERPL-MCNC: 4.6 G/DL (ref 3.5–5.2)
ALP BLD-CCNC: 64 U/L (ref 35–104)
ALT SERPL-CCNC: 14 U/L (ref 5–33)
ANION GAP SERPL CALCULATED.3IONS-SCNC: 19 MMOL/L (ref 7–19)
AST SERPL-CCNC: 16 U/L (ref 5–32)
BILIRUB SERPL-MCNC: 0.7 MG/DL (ref 0.2–1.2)
BUN BLDV-MCNC: 9 MG/DL (ref 6–20)
CALCIUM SERPL-MCNC: 9.9 MG/DL (ref 8.6–10)
CHLORIDE BLD-SCNC: 106 MMOL/L (ref 98–111)
CO2: 21 MMOL/L (ref 22–29)
CREAT SERPL-MCNC: 0.4 MG/DL (ref 0.5–0.9)
GFR NON-AFRICAN AMERICAN: >60
GLUCOSE BLD-MCNC: 72 MG/DL (ref 74–109)
HCT VFR BLD CALC: 38.4 % (ref 37–47)
HEMOGLOBIN: 13.1 G/DL (ref 12–16)
LAB AP CASE REPORT: NORMAL
Lab: NORMAL
MCH RBC QN AUTO: 31.6 PG (ref 27–31)
MCHC RBC AUTO-ENTMCNC: 34.1 G/DL (ref 33–37)
MCV RBC AUTO: 92.8 FL (ref 81–99)
PATH REPORT.FINAL DX SPEC: NORMAL
PATH REPORT.GROSS SPEC: NORMAL
PDW BLD-RTO: 11.8 % (ref 11.5–14.5)
PLATELET # BLD: 212 K/UL (ref 130–400)
PMV BLD AUTO: 10.9 FL (ref 9.4–12.3)
POTASSIUM SERPL-SCNC: 4.5 MMOL/L (ref 3.5–5)
PREALBUMIN: 20 MG/DL (ref 20–40)
RBC # BLD: 4.14 M/UL (ref 4.2–5.4)
SODIUM BLD-SCNC: 146 MMOL/L (ref 136–145)
TOTAL PROTEIN: 7.1 G/DL (ref 6.6–8.7)
VITAMIN B-12: 730 PG/ML (ref 211–946)
WBC # BLD: 7.5 K/UL (ref 4.8–10.8)

## 2017-08-03 PROCEDURE — 36415 COLL VENOUS BLD VENIPUNCTURE: CPT | Performed by: NURSE PRACTITIONER

## 2017-08-03 PROCEDURE — 99214 OFFICE O/P EST MOD 30 MIN: CPT | Performed by: NURSE PRACTITIONER

## 2017-08-03 RX ORDER — RANITIDINE 150 MG/1
150 TABLET ORAL 2 TIMES DAILY
COMMUNITY
End: 2017-10-03 | Stop reason: SDUPTHER

## 2017-08-03 RX ORDER — ERYTHROMYCIN ETHYLSUCCINATE 200 MG/5ML
200 SUSPENSION ORAL
Qty: 450 ML | Refills: 0 | Status: SHIPPED | OUTPATIENT
Start: 2017-08-03 | End: 2017-08-21 | Stop reason: ALTCHOICE

## 2017-08-03 ASSESSMENT — ENCOUNTER SYMPTOMS
HEMOPTYSIS: 0
ORTHOPNEA: 0
EYES NEGATIVE: 1
DIARRHEA: 0
ABDOMINAL PAIN: 1
HEARTBURN: 1
SPUTUM PRODUCTION: 0
RESPIRATORY NEGATIVE: 1
BACK PAIN: 0
COUGH: 0
ROS SKIN COMMENTS: HAIR LOSS
VOMITING: 1
CONSTIPATION: 0
NAUSEA: 1

## 2017-08-04 ENCOUNTER — TELEPHONE (OUTPATIENT)
Dept: FAMILY MEDICINE CLINIC | Age: 22
End: 2017-08-04

## 2017-08-04 RX ORDER — SCOLOPAMINE TRANSDERMAL SYSTEM 1 MG/1
1 PATCH, EXTENDED RELEASE TRANSDERMAL
Qty: 10 PATCH | Refills: 3 | Status: SHIPPED | OUTPATIENT
Start: 2017-08-04 | End: 2017-08-21

## 2017-08-04 RX ORDER — PROMETHAZINE HYDROCHLORIDE 25 MG/1
25 TABLET ORAL EVERY 4 HOURS PRN
Qty: 30 TABLET | Refills: 1 | Status: SHIPPED | OUTPATIENT
Start: 2017-08-04 | End: 2018-08-04

## 2017-08-18 ENCOUNTER — TELEPHONE (OUTPATIENT)
Dept: CARDIAC SURGERY | Facility: CLINIC | Age: 22
End: 2017-08-18

## 2017-08-18 NOTE — TELEPHONE ENCOUNTER
Received a voicemail from patient inquiring status of her referral to motility clinic in [place].  Contacted patient back at [phone] to let her know that her information has been faxed and we are waiting to hear back from the clinic.

## 2017-08-21 ENCOUNTER — HOSPITAL ENCOUNTER (OUTPATIENT)
Dept: ULTRASOUND IMAGING | Age: 22
Discharge: HOME | End: 2017-08-21
Payer: COMMERCIAL

## 2017-08-21 ENCOUNTER — OFFICE VISIT (OUTPATIENT)
Dept: OBGYN | Age: 22
End: 2017-08-21
Payer: COMMERCIAL

## 2017-08-21 VITALS
DIASTOLIC BLOOD PRESSURE: 75 MMHG | SYSTOLIC BLOOD PRESSURE: 114 MMHG | WEIGHT: 100.1 LBS | BODY MASS INDEX: 19.65 KG/M2 | HEART RATE: 125 BPM | HEIGHT: 60 IN | TEMPERATURE: 98.8 F

## 2017-08-21 DIAGNOSIS — Z98.891 PREVIOUS CESAREAN SECTION: ICD-10-CM

## 2017-08-21 DIAGNOSIS — K31.84 GASTROPARESIS: ICD-10-CM

## 2017-08-21 DIAGNOSIS — Z36.89 CONFIRM FETAL CARDIAC ACTIVITY USING ULTRASOUND: ICD-10-CM

## 2017-08-21 DIAGNOSIS — Z13.228 SCREENING FOR CYSTIC FIBROSIS: ICD-10-CM

## 2017-08-21 DIAGNOSIS — R10.9 ABDOMINAL CRAMPING AFFECTING PREGNANCY: ICD-10-CM

## 2017-08-21 DIAGNOSIS — O26.899 ABDOMINAL CRAMPING AFFECTING PREGNANCY: ICD-10-CM

## 2017-08-21 DIAGNOSIS — Q79.60 EHLERS-DANLOS SYNDROME: ICD-10-CM

## 2017-08-21 DIAGNOSIS — N91.2 AMENORRHEA: Primary | ICD-10-CM

## 2017-08-21 DIAGNOSIS — Z32.01 PREGNANCY CONFIRMED BY POSITIVE URINE TEST: ICD-10-CM

## 2017-08-21 DIAGNOSIS — G90.A POSTURAL ORTHOSTATIC TACHYCARDIA SYNDROME: ICD-10-CM

## 2017-08-21 LAB
EXTERNAL ABO GROUPING: NORMAL
EXTERNAL ANTIBODY SCREEN: NEGATIVE
EXTERNAL RH FACTOR: POSITIVE
EXTERNAL SYPHILIS RPR SCREEN: NORMAL
HIV1 P24 AG SERPL QL IA: NORMAL

## 2017-08-21 PROCEDURE — 81025 URINE PREGNANCY TEST: CPT

## 2017-08-21 PROCEDURE — 76801 OB US < 14 WKS SINGLE FETUS: CPT

## 2017-08-21 PROCEDURE — 99213 OFFICE O/P EST LOW 20 MIN: CPT

## 2017-08-21 RX ORDER — FOLIC ACID 1 MG/1
1 TABLET ORAL DAILY
COMMUNITY
End: 2018-09-19

## 2017-08-21 ASSESSMENT — ENCOUNTER SYMPTOMS
TROUBLE SWALLOWING: 0
BLOOD IN STOOL: 0
BACK PAIN: 0
SHORTNESS OF BREATH: 0
COUGH: 0
COLOR CHANGE: 0
CONSTIPATION: 0
DIARRHEA: 0

## 2017-08-22 ENCOUNTER — TELEPHONE (OUTPATIENT)
Dept: OBGYN | Age: 22
End: 2017-08-22

## 2017-08-23 LAB
ABO/RH: NORMAL
ANTIBODY SCREEN: NEGATIVE
CHLAMYDIA TRACHOMATIS AMPLIFIED DET: NEGATIVE
HEPATITIS B SURFACE ANTIGEN INTERPRETATION: NEGATIVE
HIV-1 AND HIV-2 ANTIBODIES: NEGATIVE
N GONORRHOEAE AMPLIFIED DET: NEGATIVE
RPR: NORMAL
RUBELLA ANTIBODY IGG: NORMAL

## 2017-08-24 ENCOUNTER — TELEPHONE (OUTPATIENT)
Dept: OBGYN | Age: 22
End: 2017-08-24

## 2017-08-24 ENCOUNTER — TRANSCRIBE ORDERS (OUTPATIENT)
Dept: ADMINISTRATIVE | Facility: HOSPITAL | Age: 22
End: 2017-08-24

## 2017-08-24 DIAGNOSIS — O36.80X0 ENCOUNTER TO DETERMINE FETAL VIABILITY OF PREGNANCY, NOT APPLICABLE OR UNSPECIFIED FETUS: Primary | ICD-10-CM

## 2017-08-24 DIAGNOSIS — O36.80X1 ENCOUNTER TO DETERMINE FETAL VIABILITY OF PREGNANCY, FETUS 1: Primary | ICD-10-CM

## 2017-08-28 ENCOUNTER — TELEPHONE (OUTPATIENT)
Dept: CARDIAC SURGERY | Facility: CLINIC | Age: 22
End: 2017-08-28

## 2017-08-28 NOTE — TELEPHONE ENCOUNTER
Received fax from [facility] to inform, they have received all necessary records in order to process patients referral, there is currently a 6-8 month wait-list for new patient appointments at the present time, patient will be scheduled once they arrive at her name on the wait-list.  They have contacted patient to notify of wait-listed appointment.  For any questions  contact Marie at [facility] at [phone]

## 2017-09-03 ENCOUNTER — HOSPITAL ENCOUNTER (EMERGENCY)
Facility: HOSPITAL | Age: 22
Discharge: HOME | End: 2017-09-03
Admitting: EMERGENCY MEDICINE

## 2017-09-03 VITALS
BODY MASS INDEX: 19.83 KG/M2 | OXYGEN SATURATION: 100 % | HEIGHT: 60 IN | DIASTOLIC BLOOD PRESSURE: 64 MMHG | HEART RATE: 100 BPM | RESPIRATION RATE: 16 BRPM | SYSTOLIC BLOOD PRESSURE: 103 MMHG | TEMPERATURE: 98.5 F | WEIGHT: 101 LBS

## 2017-09-03 DIAGNOSIS — L02.31 ABSCESS OF BUTTOCK: Primary | ICD-10-CM

## 2017-09-03 PROCEDURE — 99282 EMERGENCY DEPT VISIT SF MDM: CPT

## 2017-09-03 NOTE — DISCHARGE INSTRUCTIONS
Apply ointment as directed, warm compress x 20 minutes every 3-4 hours.  Avoid further irritation of area.  Follow up with PCP for recheck.  Return to ED if conditions worsens.

## 2017-09-03 NOTE — ED NOTES
Patient has a small vesicle located on her right gluteus that has been present for one day;  Patient has history of MRSA and wanted to get it evaluated soon;  Patient is also approximately seven weeks pregnant.     Justina Owens RN  09/03/17 6732

## 2017-09-03 NOTE — ED PROVIDER NOTES
Subjective   HPI Comments: Pt presents with complaint of small abscess to right buttock.  Started ~1 day ago, described as small and tender.  She reports symptoms are not severe but she is 7 weeks pregnant and has hx of MRSA.  She also has gastroparesis and cannot tolerate most oral antibiotics.  She wanted to have this evaluated early to try to obtain easier method of treatment and prevent complications.    Symptoms are well localized to right lower buttock, denies open wound or discharge from area.  No associated fever, chills or appetite changes.  No other systemic symptoms.  No other lesions.      History provided by:  Patient      Review of Systems   Constitutional: Negative for appetite change, chills and fever.   Respiratory: Negative for cough, chest tightness and shortness of breath.    Cardiovascular: Negative for chest pain and palpitations.   Gastrointestinal: Negative for abdominal pain, diarrhea, nausea and vomiting.   Genitourinary: Negative for dysuria, frequency and hematuria.        ~7 weeks pregnant   Musculoskeletal: Negative for arthralgias, back pain, gait problem and myalgias.   Skin: Negative for pallor, rash and wound.        + per HPI   Neurological: Negative for dizziness, weakness, light-headedness and numbness.   Hematological: Negative for adenopathy.       Past Medical History:   Diagnosis Date   • Depression    • Dyspepsia    • Gastroparesis    • GERD (gastroesophageal reflux disease)    • IBS (irritable bowel syndrome)    • Sinus tachycardia    • Tachycardia        Allergies   Allergen Reactions   • Compazine [Prochlorperazine]    • Percocet [Oxycodone-Acetaminophen] Rash       Past Surgical History:   Procedure Laterality Date   •  SECTION     • CHOLECYSTECTOMY     • ENDOSCOPY  2016   • ENDOSCOPY N/A 2017    Procedure: ESOPHAGOGASTRODUODENOSCOPY possible dilation ;  Surgeon: Franky Berman MD;  Location: [facility] ENDOSCOPY;  Service:    • TONSILLECTOMY          Family History   Problem Relation Age of Onset   • Hypertension Mother    • Hypertension Father    • Arthritis Father    • Hyperlipidemia Father    • Heart disease Father    • No Known Problems Sister    • No Known Problems Brother        Social History     Social History   • Marital status:      Spouse name: N/A   • Number of children: N/A   • Years of education: N/A     Social History Main Topics   • Smoking status: Never Smoker   • Smokeless tobacco: Never Used   • Alcohol use No   • Drug use: No   • Sexual activity: Yes     Partners: Male      Comment: nuva ring for birth control     Other Topics Concern   • None     Social History Narrative           Objective   Physical Exam   Constitutional: She is oriented to person, place, and time. She appears well-developed and well-nourished. No distress.   HENT:   Head: Normocephalic and atraumatic.   Mouth/Throat: Oropharynx is clear and moist.   Eyes: Conjunctivae are normal. Pupils are equal, round, and reactive to light.   Neck: Normal range of motion. Neck supple.   Cardiovascular: Normal rate, regular rhythm and normal heart sounds.    Pulmonary/Chest: Effort normal and breath sounds normal.   Abdominal: Soft. Bowel sounds are normal.   Neurological: She is alert and oriented to person, place, and time.   Skin: Skin is warm and dry. She is not diaphoretic. No pallor.        Nursing note and vitals reviewed.      Procedures         ED Course  ED Course   Comment By Time   Pt stable, symptoms are mild.  Due to complex medical hx and hx of PO medication intolerance, will try conservative therapy with topical treatment first.  Symptoms are mild based on HPI and exam.  Will start on topical Bactroban and recommended warm compress.  Will f/u with PCP in 2-3 days for recheck or return to ED if condition worsens. LOTUS Thornton 09/03 9242                  MDM  Number of Diagnoses or Management Options  Abscess of buttock: new and does not require  workup  Risk of Complications, Morbidity, and/or Mortality  Presenting problems: low  Management options: low    Patient Progress  Patient progress: stable      Final diagnoses:   Abscess of buttock            Michelle LOTUS Flores  09/04/17 1543

## 2017-09-05 ENCOUNTER — HOSPITAL ENCOUNTER (OUTPATIENT)
Dept: ULTRASOUND IMAGING | Facility: HOSPITAL | Age: 22
Discharge: HOME | End: 2017-09-05

## 2017-09-05 ENCOUNTER — INITIAL PRENATAL (OUTPATIENT)
Dept: OBGYN | Age: 22
End: 2017-09-05

## 2017-09-05 VITALS
SYSTOLIC BLOOD PRESSURE: 101 MMHG | WEIGHT: 100 LBS | DIASTOLIC BLOOD PRESSURE: 64 MMHG | HEART RATE: 107 BPM | BODY MASS INDEX: 19.86 KG/M2

## 2017-09-05 DIAGNOSIS — O46.8X1 SUBCHORIONIC HEMATOMA IN FIRST TRIMESTER: ICD-10-CM

## 2017-09-05 DIAGNOSIS — Z98.891 PREVIOUS CESAREAN SECTION: ICD-10-CM

## 2017-09-05 DIAGNOSIS — O41.8X10 SUBCHORIONIC HEMATOMA IN FIRST TRIMESTER: ICD-10-CM

## 2017-09-05 DIAGNOSIS — Q79.60 EHLERS-DANLOS SYNDROME: ICD-10-CM

## 2017-09-05 DIAGNOSIS — O09.91 SUPERVISION OF HIGH RISK PREGNANCY IN FIRST TRIMESTER: Primary | ICD-10-CM

## 2017-09-05 DIAGNOSIS — O36.80X0 ENCOUNTER TO DETERMINE FETAL VIABILITY OF PREGNANCY, NOT APPLICABLE OR UNSPECIFIED FETUS: ICD-10-CM

## 2017-09-05 DIAGNOSIS — K31.84 GASTROPARESIS: ICD-10-CM

## 2017-09-05 DIAGNOSIS — O36.80X1 ENCOUNTER TO DETERMINE FETAL VIABILITY OF PREGNANCY, FETUS 1: ICD-10-CM

## 2017-09-05 DIAGNOSIS — G90.A POSTURAL ORTHOSTATIC TACHYCARDIA SYNDROME: ICD-10-CM

## 2017-09-05 PROCEDURE — 0502F SUBSEQUENT PRENATAL CARE: CPT

## 2017-09-05 PROCEDURE — 76801 OB US < 14 WKS SINGLE FETUS: CPT

## 2017-09-05 RX ORDER — PROMETHAZINE HYDROCHLORIDE 25 MG/1
25 SUPPOSITORY RECTAL EVERY 6 HOURS PRN
Qty: 30 SUPPOSITORY | Refills: 1 | Status: SHIPPED | OUTPATIENT
Start: 2017-09-05 | End: 2017-09-12

## 2017-09-06 NOTE — ED NOTES
"ED Call Back Questions    1. How are you doing since leaving the Emergency Department?    Doing much better  2. Do you have any questions about your discharge instructions? No     3. Have you filled your new prescriptions yet? Yes   a. Do you have any questions about those medications? No     4. Were you able to make a follow-up appointment with the physician? No     5. Do you have a primary care physician? Yes   a. If No, would you like for me to set you up with one? N/A  i. If Yes, “I will have our ED  give you a call right back at this number to work with you on the best time for an appointment.”    6. We are always looking to get better at what we do. Do you have any suggestions for what we can do to be even better? N/A  a. If Yes, \"Thank you for sharing your concerns. I apologize. I will follow up with our manager and patient . Would you like someone to call you back?\" No     7. Is there anything else I can do for you? No   Visit was great, my doctor was curtis Ma  09/06/17 3426    "

## 2017-09-12 ENCOUNTER — TELEPHONE (OUTPATIENT)
Dept: OBGYN | Age: 22
End: 2017-09-12

## 2017-09-29 ENCOUNTER — TELEPHONE (OUTPATIENT)
Dept: OBGYN | Age: 22
End: 2017-09-29

## 2017-09-29 NOTE — TELEPHONE ENCOUNTER
VM:  Pt states she called insurance to see if Claritest would be covered. Was told it needed a prior auth.

## 2017-10-03 ENCOUNTER — ROUTINE PRENATAL (OUTPATIENT)
Dept: OBGYN | Age: 22
End: 2017-10-03

## 2017-10-03 VITALS — SYSTOLIC BLOOD PRESSURE: 95 MMHG | DIASTOLIC BLOOD PRESSURE: 60 MMHG | BODY MASS INDEX: 19.66 KG/M2 | WEIGHT: 99 LBS

## 2017-10-03 DIAGNOSIS — Q79.60 EHLERS-DANLOS SYNDROME: ICD-10-CM

## 2017-10-03 DIAGNOSIS — O09.91 SUPERVISION OF HIGH RISK PREGNANCY IN FIRST TRIMESTER: Primary | ICD-10-CM

## 2017-10-03 DIAGNOSIS — K31.84 GASTROPARESIS: ICD-10-CM

## 2017-10-03 DIAGNOSIS — K21.00 GASTROESOPHAGEAL REFLUX DISEASE WITH ESOPHAGITIS: ICD-10-CM

## 2017-10-03 DIAGNOSIS — G90.A POSTURAL ORTHOSTATIC TACHYCARDIA SYNDROME: ICD-10-CM

## 2017-10-03 DIAGNOSIS — Z36.9 ENCOUNTER FOR ANTENATAL SCREENING: Primary | ICD-10-CM

## 2017-10-03 PROCEDURE — 0502F SUBSEQUENT PRENATAL CARE: CPT | Performed by: NURSE PRACTITIONER

## 2017-10-03 RX ORDER — RANITIDINE 300 MG/1
300 TABLET ORAL 2 TIMES DAILY
Qty: 60 TABLET | Refills: 5 | Status: SHIPPED | OUTPATIENT
Start: 2017-10-03 | End: 2018-09-19

## 2017-10-03 NOTE — PATIENT INSTRUCTIONS

## 2017-10-06 LAB — MS ALPHA-FETOPROTEIN: NORMAL

## 2017-10-31 ENCOUNTER — ROUTINE PRENATAL (OUTPATIENT)
Dept: OBGYN | Age: 22
End: 2017-10-31

## 2017-10-31 VITALS
HEART RATE: 108 BPM | DIASTOLIC BLOOD PRESSURE: 77 MMHG | SYSTOLIC BLOOD PRESSURE: 105 MMHG | WEIGHT: 99.6 LBS | BODY MASS INDEX: 19.78 KG/M2

## 2017-10-31 DIAGNOSIS — G90.A POSTURAL ORTHOSTATIC TACHYCARDIA SYNDROME: ICD-10-CM

## 2017-10-31 DIAGNOSIS — O09.92 SUPERVISION OF HIGH RISK PREGNANCY IN SECOND TRIMESTER: Primary | ICD-10-CM

## 2017-10-31 DIAGNOSIS — Z3A.14 14 WEEKS GESTATION OF PREGNANCY: ICD-10-CM

## 2017-10-31 DIAGNOSIS — Q79.60 EHLERS-DANLOS SYNDROME: ICD-10-CM

## 2017-10-31 PROCEDURE — 0502F SUBSEQUENT PRENATAL CARE: CPT

## 2017-10-31 NOTE — PROGRESS NOTES
Pt presents for routine prenatal visit. Reviewed [facility] progress notes. Pt will have anatomy scan at [facility]. Pt to follow up in 4 weeks.

## 2017-10-31 NOTE — PATIENT INSTRUCTIONS
the back of the baby's neck. An increase in the thickness can be an early sign of Down syndrome. ¨ Chorionic villus sampling (CVS). This is a test that looks for certain genetic problems with your baby. The same genes that are in your baby are in the placenta. A small piece of the placenta is taken out and tested. This test is done when you are 10 to 13 weeks pregnant. Ease discomfort  · Slow down and take naps when you feel tired. · If your emotions swing, talk to someone. Crying, anxiety, and concentration problems are common. · If your gums bleed, try a softer toothbrush. If your gums are puffy and bleed a lot, see your dentist.  · If you feel dizzy:  ¨ Get up slowly after sitting or lying down. ¨ Drink plenty of fluids. ¨ Eat small snacks to keep your blood sugar stable. ¨ Put your head between your legs as though you were tying your shoelaces. ¨ Lie down with your legs higher than your head. Use pillows to prop up your feet. · If you have a headache:  ¨ Lie down. ¨ Ask your partner or a good friend for a neck massage. ¨ Try cool cloths over your forehead or across the back of your neck. ¨ Use acetaminophen (Tylenol) for pain relief. Do not use nonsteroidal anti-inflammatory drugs (NSAIDs), such as ibuprofen (Advil, Motrin) or naproxen (Aleve), unless your doctor says it is okay. · If you have a nosebleed, pinch your nose gently, and hold it for a short while. To prevent nosebleeds, try massaging a small dab of petroleum jelly, such as Vaseline, in your nostrils. · If your nose is stuffed up, try saline (saltwater) nose sprays. Do not use decongestant sprays. Care for your breasts  · Wear a bra that gives you good support. · Know that changes in your breasts are normal.  ¨ Your breasts may get larger and more tender. Tenderness usually gets better by 12 weeks. ¨ Your nipples may get darker and larger, and small bumps around your nipples may show more.   ¨ The veins in your chest and breasts may

## 2017-11-28 ENCOUNTER — ROUTINE PRENATAL (OUTPATIENT)
Dept: OBGYN | Age: 22
End: 2017-11-28

## 2017-11-28 VITALS
BODY MASS INDEX: 21.45 KG/M2 | SYSTOLIC BLOOD PRESSURE: 111 MMHG | WEIGHT: 108 LBS | HEART RATE: 118 BPM | DIASTOLIC BLOOD PRESSURE: 77 MMHG

## 2017-11-28 DIAGNOSIS — O09.92 SUPERVISION OF HIGH RISK PREGNANCY IN SECOND TRIMESTER: Primary | ICD-10-CM

## 2017-11-28 DIAGNOSIS — G90.A POSTURAL ORTHOSTATIC TACHYCARDIA SYNDROME: ICD-10-CM

## 2017-11-28 DIAGNOSIS — Z3A.18 18 WEEKS GESTATION OF PREGNANCY: ICD-10-CM

## 2017-11-28 DIAGNOSIS — Q79.60 EHLERS-DANLOS SYNDROME: ICD-10-CM

## 2017-11-28 DIAGNOSIS — K31.84 GASTROPARESIS: ICD-10-CM

## 2017-11-28 PROCEDURE — 0502F SUBSEQUENT PRENATAL CARE: CPT

## 2017-11-28 NOTE — PATIENT INSTRUCTIONS
Patient Education        Weeks 18 to 22 of Your Pregnancy: Care Instructions  Your Care Instructions    Your baby is continuing to develop quickly. At this stage, babies can now suck their thumbs,  firmly with their hands, and open and close their eyelids. Sometime between 18 and 22 weeks, you will start to feel your baby move. At first, these small fetal movements feel like fluttering or \"butterflies. \" Some women say that they feel like gas bubbles. As the baby grows, these movements will become stronger. You may also notice that your baby kicks and hiccups. During this time, you may find that your nausea and fatigue are gone. Overall, you may feel better and have more energy than you did in your first trimester. But you may also have new discomforts now, such as sleep problems or leg cramps. This care sheet can help you ease these discomforts. Follow-up care is a key part of your treatment and safety. Be sure to make and go to all appointments, and call your doctor if you are having problems. It's also a good idea to know your test results and keep a list of the medicines you take. How can you care for yourself at home? Ease sleep problems  · Avoid caffeine in drinks or chocolate late in the day. · Get some exercise every day. · Take a warm shower or bath before bed. · Have a light snack or glass of milk at bedtime. · Do relaxation exercises in bed to calm your mind and body. · Support your legs and back with extra pillows. Try a pillow between your legs if you sleep on your side. · Do not use sleeping pills or alcohol. They could harm your baby. Ease leg cramps  · Do not massage your calf during the cramp. · Sit on a firm bed or chair. Straighten your leg, and bend your foot (flex your ankle) slowly upward, toward your knee. Bend your toes up and down. · Stand on a cool, flat surface. Stretch your toes upward, and take small steps walking on your heels.   · Use a heating pad or hot water bottle to help with muscle ache. Prevent leg cramps  · Be sure to get enough calcium. If you are worried that you are not getting enough, talk to your doctor. · Exercise every day, and stretch your legs before bed. · Take a warm bath before bed, and try leg warmers at night. Where can you learn more? Go to https://chpecarlosewdiego.healthCrowdEngineering. org and sign in to your [email or web address] account. Enter C918 in the [employer] box to learn more about \"Weeks 18 to 22 of Your Pregnancy: Care Instructions. \"     If you do not have an account, please click on the \"Sign Up Now\" link. Current as of: March 16, 2017  Content Version: 11.3  © 2592-0195 [employer]. Care instructions adapted under license by [facility] ([facility]). If you have questions about a medical condition or this instruction, always ask your healthcare professional. [address] any warranty or liability for your use of this information.

## 2017-12-26 ENCOUNTER — ROUTINE PRENATAL (OUTPATIENT)
Dept: OBGYN | Age: 22
End: 2017-12-26

## 2017-12-26 VITALS
SYSTOLIC BLOOD PRESSURE: 103 MMHG | WEIGHT: 114 LBS | BODY MASS INDEX: 22.64 KG/M2 | HEART RATE: 121 BPM | DIASTOLIC BLOOD PRESSURE: 70 MMHG

## 2017-12-26 DIAGNOSIS — K31.84 GASTROPARESIS: ICD-10-CM

## 2017-12-26 DIAGNOSIS — Q79.60 EHLERS-DANLOS SYNDROME: ICD-10-CM

## 2017-12-26 DIAGNOSIS — G90.A POSTURAL ORTHOSTATIC TACHYCARDIA SYNDROME: Primary | ICD-10-CM

## 2017-12-26 DIAGNOSIS — Z3A.22 22 WEEKS GESTATION OF PREGNANCY: ICD-10-CM

## 2017-12-26 PROCEDURE — 0502F SUBSEQUENT PRENATAL CARE: CPT

## 2017-12-26 RX ORDER — FERROUS SULFATE 325(65) MG
325 TABLET ORAL
COMMUNITY
End: 2018-09-19

## 2017-12-26 NOTE — PATIENT INSTRUCTIONS
Patient Education        Weeks 18 to 22 of Your Pregnancy: Care Instructions  Your Care Instructions    Your baby is continuing to develop quickly. At this stage, babies can now suck their thumbs,  firmly with their hands, and open and close their eyelids. Sometime between 18 and 22 weeks, you will start to feel your baby move. At first, these small fetal movements feel like fluttering or \"butterflies. \" Some women say that they feel like gas bubbles. As the baby grows, these movements will become stronger. You may also notice that your baby kicks and hiccups. During this time, you may find that your nausea and fatigue are gone. Overall, you may feel better and have more energy than you did in your first trimester. But you may also have new discomforts now, such as sleep problems or leg cramps. This care sheet can help you ease these discomforts. Follow-up care is a key part of your treatment and safety. Be sure to make and go to all appointments, and call your doctor if you are having problems. It's also a good idea to know your test results and keep a list of the medicines you take. How can you care for yourself at home? Ease sleep problems  · Avoid caffeine in drinks or chocolate late in the day. · Get some exercise every day. · Take a warm shower or bath before bed. · Have a light snack or glass of milk at bedtime. · Do relaxation exercises in bed to calm your mind and body. · Support your legs and back with extra pillows. Try a pillow between your legs if you sleep on your side. · Do not use sleeping pills or alcohol. They could harm your baby. Ease leg cramps  · Do not massage your calf during the cramp. · Sit on a firm bed or chair. Straighten your leg, and bend your foot (flex your ankle) slowly upward, toward your knee. Bend your toes up and down. · Stand on a cool, flat surface. Stretch your toes upward, and take small steps walking on your heels.   · Use a heating pad or hot water bottle to help with muscle ache. Prevent leg cramps  · Be sure to get enough calcium. If you are worried that you are not getting enough, talk to your doctor. · Exercise every day, and stretch your legs before bed. · Take a warm bath before bed, and try leg warmers at night. Where can you learn more? Go to https://chpecarlosewdiego.healthcopygram. org and sign in to your [employer] account. Enter A931 in the [employer] box to learn more about \"Weeks 18 to 22 of Your Pregnancy: Care Instructions. \"     If you do not have an account, please click on the \"Sign Up Now\" link. Current as of: March 16, 2017  Content Version: 11.4  © 6165-5409 Healthwise, Incorporated. Care instructions adapted under license by [facility] ([facility]). If you have questions about a medical condition or this instruction, always ask your healthcare professional. [address] any warranty or liability for your use of this information.

## 2018-01-22 ENCOUNTER — ROUTINE PRENATAL (OUTPATIENT)
Dept: OBGYN | Age: 23
End: 2018-01-22

## 2018-01-22 VITALS
DIASTOLIC BLOOD PRESSURE: 76 MMHG | SYSTOLIC BLOOD PRESSURE: 113 MMHG | WEIGHT: 120 LBS | HEART RATE: 114 BPM | BODY MASS INDEX: 23.83 KG/M2

## 2018-01-22 DIAGNOSIS — G90.A POSTURAL ORTHOSTATIC TACHYCARDIA SYNDROME: ICD-10-CM

## 2018-01-22 DIAGNOSIS — Q79.60 EHLERS-DANLOS SYNDROME: ICD-10-CM

## 2018-01-22 DIAGNOSIS — K31.84 GASTROPARESIS: ICD-10-CM

## 2018-01-22 DIAGNOSIS — Z3A.26 26 WEEKS GESTATION OF PREGNANCY: ICD-10-CM

## 2018-01-22 DIAGNOSIS — O09.92 SUPERVISION OF HIGH RISK PREGNANCY IN SECOND TRIMESTER: Primary | ICD-10-CM

## 2018-01-22 DIAGNOSIS — O47.00 PRETERM UTERINE CONTRACTIONS: ICD-10-CM

## 2018-01-22 PROBLEM — O41.8X10 SUBCHORIONIC HEMATOMA IN FIRST TRIMESTER: Status: RESOLVED | Noted: 2017-09-05 | Resolved: 2018-01-22

## 2018-01-22 PROBLEM — O46.8X1 SUBCHORIONIC HEMATOMA IN FIRST TRIMESTER: Status: RESOLVED | Noted: 2017-09-05 | Resolved: 2018-01-22

## 2018-01-22 PROCEDURE — 0502F SUBSEQUENT PRENATAL CARE: CPT

## 2018-01-22 NOTE — PROGRESS NOTES
Pt reports irregular cramping.  labor precautions reviewed. Cervix is closed but soft. Bedrest instructed along with vaginal rest.  Pt does not need a letter for work. Pt to follow up in 1 week, GCT. RCS discussed and all questions answered.

## 2018-01-22 NOTE — PATIENT INSTRUCTIONS
more sessions each day. Ease or reduce swelling in your feet, ankles, hands, and fingers  · If your fingers are puffy, take off your rings. · Do not eat high-salt foods, such as potato chips. · Prop up your feet on a stool or couch as much as possible. Sleep with pillows under your feet. · Do not stand for long periods of time or wear tight shoes. · Wear support stockings. Where can you learn more? Go to https://Catapulter.[facility] [facility]. org and sign in to your [employer] account. Enter G264 in the [employer] box to learn more about \"Weeks 22 to 26 of Your Pregnancy: Care Instructions. \"     If you do not have an account, please click on the \"Sign Up Now\" link. Current as of: March 16, 2017  Content Version: 11.5  © 3142-4589 Healthwise, Incorporated. Care instructions adapted under license by [facility] ([facility]). If you have questions about a medical condition or this instruction, always ask your healthcare professional. [place] [address] any warranty or liability for your use of this information.

## 2018-01-27 ENCOUNTER — HOSPITAL ENCOUNTER (OUTPATIENT)
Facility: HOSPITAL | Age: 23
Setting detail: OBSERVATION
Discharge: HOME | End: 2018-01-27
Admitting: OBSTETRICS & GYNECOLOGY

## 2018-01-27 VITALS
DIASTOLIC BLOOD PRESSURE: 87 MMHG | HEART RATE: 125 BPM | RESPIRATION RATE: 16 BRPM | SYSTOLIC BLOOD PRESSURE: 127 MMHG | WEIGHT: 124 LBS | BODY MASS INDEX: 25 KG/M2 | TEMPERATURE: 98.4 F | HEIGHT: 59 IN

## 2018-01-27 PROBLEM — Z34.92 SECOND TRIMESTER PREGNANCY: Status: ACTIVE | Noted: 2018-01-27

## 2018-01-27 LAB
BILIRUB UR QL STRIP: NEGATIVE
CLARITY UR: CLEAR
COLOR UR: YELLOW
GLUCOSE UR STRIP-MCNC: NEGATIVE MG/DL
HGB UR QL STRIP.AUTO: NEGATIVE
KETONES UR QL STRIP: NEGATIVE
LEUKOCYTE ESTERASE UR QL STRIP.AUTO: NEGATIVE
NITRITE UR QL STRIP: NEGATIVE
PH UR STRIP.AUTO: 6 [PH] (ref 5–8)
PROT UR QL STRIP: NEGATIVE
SP GR UR STRIP: 1.02 (ref 1–1.03)
UROBILINOGEN UR QL STRIP: NORMAL

## 2018-01-27 PROCEDURE — 81003 URINALYSIS AUTO W/O SCOPE: CPT | Performed by: OBSTETRICS & GYNECOLOGY

## 2018-01-27 PROCEDURE — G0378 HOSPITAL OBSERVATION PER HR: HCPCS

## 2018-01-27 PROCEDURE — 51702 INSERT TEMP BLADDER CATH: CPT

## 2018-01-27 PROCEDURE — G0463 HOSPITAL OUTPT CLINIC VISIT: HCPCS

## 2018-01-27 RX ORDER — FOLIC ACID 1 MG/1
1 TABLET ORAL DAILY
COMMUNITY
End: 2018-06-25

## 2018-01-28 NOTE — NURSING NOTE
Patient arrives with complaints of vaginal pressure and cramping for the past 4 hours. States she has PO hydrated with 2 bottles of water since symptoms began. States she was seen at Dr. Meredith on 1/22/18 for routine appointment, c/o contractions, cervical exam was performed by Dr. Naylor and she stated her cervix was softening and to follow up in 1 week. Pt has seen M for maternal history of: Gastroparesis, Samantha-danlos syndrom, POTS.  Dr. Sanchez notified of patients arrival and complaints of, informed of fetal HR with adequate variability and accerations and no visible or palpated contractions noted. Orders received to PO hydrate, obtain a Urinalysis, and perform a gentle cervical exam and to call with update after all results are back.

## 2018-01-29 ENCOUNTER — ROUTINE PRENATAL (OUTPATIENT)
Dept: OBGYN | Age: 23
End: 2018-01-29

## 2018-01-29 VITALS
WEIGHT: 123 LBS | SYSTOLIC BLOOD PRESSURE: 126 MMHG | DIASTOLIC BLOOD PRESSURE: 75 MMHG | BODY MASS INDEX: 24.43 KG/M2 | HEART RATE: 107 BPM

## 2018-01-29 DIAGNOSIS — Q79.60 EHLERS-DANLOS SYNDROME: ICD-10-CM

## 2018-01-29 DIAGNOSIS — K31.84 GASTROPARESIS: ICD-10-CM

## 2018-01-29 DIAGNOSIS — Z3A.27 27 WEEKS GESTATION OF PREGNANCY: ICD-10-CM

## 2018-01-29 DIAGNOSIS — G90.A POSTURAL ORTHOSTATIC TACHYCARDIA SYNDROME: ICD-10-CM

## 2018-01-29 DIAGNOSIS — O09.92 SUPERVISION OF HIGH RISK PREGNANCY IN SECOND TRIMESTER: Primary | ICD-10-CM

## 2018-01-29 PROCEDURE — 0502F SUBSEQUENT PRENATAL CARE: CPT

## 2018-01-29 NOTE — PATIENT INSTRUCTIONS
Version: 11.5  © 5176-8862 Healthwise, Incorporated. Care instructions adapted under license by [facility] ([facility]). If you have questions about a medical condition or this instruction, always ask your healthcare professional. [address] any warranty or liability for your use of this information.

## 2018-02-02 ENCOUNTER — TELEPHONE (OUTPATIENT)
Dept: OBGYN | Age: 23
End: 2018-02-02

## 2018-02-12 ENCOUNTER — HOSPITAL ENCOUNTER (OUTPATIENT)
Facility: HOSPITAL | Age: 23
Setting detail: OBSERVATION
Discharge: HOME | End: 2018-02-12

## 2018-02-12 ENCOUNTER — TELEPHONE (OUTPATIENT)
Dept: OBGYN | Age: 23
End: 2018-02-12

## 2018-02-12 VITALS
HEART RATE: 124 BPM | RESPIRATION RATE: 18 BRPM | HEIGHT: 58 IN | SYSTOLIC BLOOD PRESSURE: 126 MMHG | WEIGHT: 128 LBS | TEMPERATURE: 98.6 F | BODY MASS INDEX: 26.87 KG/M2 | DIASTOLIC BLOOD PRESSURE: 71 MMHG

## 2018-02-12 PROCEDURE — G0378 HOSPITAL OBSERVATION PER HR: HCPCS

## 2018-02-13 PROBLEM — Z34.90 PREGNANCY: Status: ACTIVE | Noted: 2018-02-13

## 2018-02-15 ENCOUNTER — ROUTINE PRENATAL (OUTPATIENT)
Dept: OBGYN | Age: 23
End: 2018-02-15

## 2018-02-15 VITALS
SYSTOLIC BLOOD PRESSURE: 123 MMHG | BODY MASS INDEX: 25.82 KG/M2 | HEART RATE: 153 BPM | DIASTOLIC BLOOD PRESSURE: 80 MMHG | WEIGHT: 130 LBS

## 2018-02-15 DIAGNOSIS — Z3A.29 29 WEEKS GESTATION OF PREGNANCY: ICD-10-CM

## 2018-02-15 DIAGNOSIS — O09.93 SUPERVISION OF HIGH RISK PREGNANCY IN THIRD TRIMESTER: Primary | ICD-10-CM

## 2018-02-15 DIAGNOSIS — G90.A POSTURAL ORTHOSTATIC TACHYCARDIA SYNDROME: ICD-10-CM

## 2018-02-15 DIAGNOSIS — K31.84 GASTROPARESIS: ICD-10-CM

## 2018-02-15 DIAGNOSIS — Z98.891 PREVIOUS CESAREAN SECTION: ICD-10-CM

## 2018-02-15 DIAGNOSIS — Q79.60 EHLERS-DANLOS SYNDROME: ICD-10-CM

## 2018-02-15 PROCEDURE — 0502F SUBSEQUENT PRENATAL CARE: CPT

## 2018-02-15 NOTE — PATIENT INSTRUCTIONS
Patient Education        Weeks 26 to 30 of Your Pregnancy: Care Instructions  Your Care Instructions    You are now in your last trimester of pregnancy. Your baby is growing rapidly. And you'll probably feel your baby moving around more often. Your doctor may ask you to count your baby's kicks. Your back may ache as your body gets used to your baby's size and length. If you haven't already had the Tdap shot during this pregnancy, talk to your doctor about getting it. It will help protect your  against pertussis infection. During this time, it's important to take care of yourself and pay attention to what your body needs. If you feel sexual, explore ways to be close with your partner that match your comfort and desire. Use the tips provided in this care sheet to find ways to be sexual in your own way. Follow-up care is a key part of your treatment and safety. Be sure to make and go to all appointments, and call your doctor if you are having problems. It's also a good idea to know your test results and keep a list of the medicines you take. How can you care for yourself at home? Take it easy at work  · Take frequent breaks. If possible, stop working when you are tired, and rest during your lunch hour. · Take bathroom breaks every 2 hours. · Change positions often. If you sit for long periods, stand up and walk around. · When you stand for a long time, keep one foot on a low stool with your knee bent. After standing a lot, sit with your feet up. · Avoid fumes, chemicals, and tobacco smoke. Be sexual in your own way  · Having sex during pregnancy is okay, unless your doctor tells you not to. · You may be very interested in sex, or you may have no interest at all. · Your growing belly can make it hard to find a good position during intercourse. [place] and explore. · You may get cramps in your uterus when your partner touches your breasts.   · A back rub may relieve the backache or cramps that

## 2018-02-26 LAB
EXTERNAL CHLAMYDIA SCREEN: NEGATIVE
EXTERNAL GONORRHEA SCREEN: NEGATIVE

## 2018-03-05 LAB
EXTERNAL HEPATITIS B SURFACE ANTIGEN: NEGATIVE
EXTERNAL HERPES PCR: NEGATIVE

## 2018-03-26 LAB — EXTERNAL GROUP B STREP ANTIGEN: NEGATIVE

## 2018-04-18 ENCOUNTER — ANESTHESIA EVENT (OUTPATIENT)
Dept: LABOR AND DELIVERY | Facility: HOSPITAL | Age: 23
End: 2018-04-18

## 2018-04-20 ENCOUNTER — HOSPITAL ENCOUNTER (OUTPATIENT)
Facility: HOSPITAL | Age: 23
Discharge: HOME | End: 2018-04-20
Attending: OBSTETRICS & GYNECOLOGY | Admitting: OBSTETRICS & GYNECOLOGY

## 2018-04-20 VITALS
HEIGHT: 59 IN | OXYGEN SATURATION: 100 % | DIASTOLIC BLOOD PRESSURE: 76 MMHG | RESPIRATION RATE: 18 BRPM | SYSTOLIC BLOOD PRESSURE: 126 MMHG | HEART RATE: 131 BPM | TEMPERATURE: 98.1 F

## 2018-04-20 PROBLEM — Z34.93 THIRD TRIMESTER PREGNANCY: Status: ACTIVE | Noted: 2018-04-20

## 2018-04-20 PROCEDURE — G0463 HOSPITAL OUTPT CLINIC VISIT: HCPCS

## 2018-04-20 PROCEDURE — 59025 FETAL NON-STRESS TEST: CPT

## 2018-04-21 NOTE — NON STRESS TEST
Nai Calvozabepatrick Golden, a  at 38w4d with an AXEL of 2018, by Patient Reported, was seen at [facility] LABOR DELIVERY for a nonstress test.    Chief Complaint   Patient presents with   • Decreased Fetal Movement       Interpretation A  Nonstress Test Interpretation A: Reactive (18 : Tabitha Loredo, RN)  Comments A: Verified by Mi Gaxiola MD (18 : Tabitha Loredo, FILIBERTO)

## 2018-04-23 ENCOUNTER — HOSPITAL ENCOUNTER (INPATIENT)
Facility: HOSPITAL | Age: 23
LOS: 2 days | Discharge: HOME | End: 2018-04-25
Attending: OBSTETRICS & GYNECOLOGY | Admitting: OBSTETRICS & GYNECOLOGY

## 2018-04-23 ENCOUNTER — ANESTHESIA (OUTPATIENT)
Dept: LABOR AND DELIVERY | Facility: HOSPITAL | Age: 23
End: 2018-04-23

## 2018-04-23 ENCOUNTER — ANESTHESIA EVENT (OUTPATIENT)
Dept: LABOR AND DELIVERY | Facility: HOSPITAL | Age: 23
End: 2018-04-23

## 2018-04-23 DIAGNOSIS — Z37.9 NORMAL LABOR: ICD-10-CM

## 2018-04-23 PROBLEM — R53.83 FATIGUE: Status: RESOLVED | Noted: 2017-01-17 | Resolved: 2018-04-23

## 2018-04-23 PROBLEM — Z34.92 SECOND TRIMESTER PREGNANCY: Status: RESOLVED | Noted: 2018-01-27 | Resolved: 2018-04-23

## 2018-04-23 PROBLEM — K31.84 GASTROPARESIS: Status: RESOLVED | Noted: 2017-06-05 | Resolved: 2018-04-23

## 2018-04-23 PROBLEM — Z34.90 PREGNANCY: Status: RESOLVED | Noted: 2018-02-13 | Resolved: 2018-04-23

## 2018-04-23 PROBLEM — Z34.93 THIRD TRIMESTER PREGNANCY: Status: RESOLVED | Noted: 2018-04-20 | Resolved: 2018-04-23

## 2018-04-23 PROBLEM — R00.0 SINUS TACHYCARDIA: Status: RESOLVED | Noted: 2017-01-17 | Resolved: 2018-04-23

## 2018-04-23 PROBLEM — E53.8 B12 DEFICIENCY: Status: RESOLVED | Noted: 2017-02-17 | Resolved: 2018-04-23

## 2018-04-23 PROBLEM — F51.01 PRIMARY INSOMNIA: Status: RESOLVED | Noted: 2017-01-17 | Resolved: 2018-04-23

## 2018-04-23 LAB
ABO GROUP BLD: NORMAL
BASOPHILS # BLD AUTO: 0.06 10*3/MM3 (ref 0–0.2)
BASOPHILS # BLD MANUAL: 0.19 10*3/MM3 (ref 0–0.2)
BASOPHILS NFR BLD AUTO: 0.3 % (ref 0–2)
BASOPHILS NFR BLD AUTO: 1 % (ref 0–2)
BLD GP AB SCN SERPL QL: NEGATIVE
DEPRECATED RDW RBC AUTO: 42.5 FL (ref 40–54)
EOSINOPHIL # BLD AUTO: 0.02 10*3/MM3 (ref 0–0.7)
EOSINOPHIL NFR BLD AUTO: 0.1 % (ref 0–4)
ERYTHROCYTE [DISTWIDTH] IN BLOOD BY AUTOMATED COUNT: 12.9 % (ref 12–15)
HCT VFR BLD AUTO: 32.4 % (ref 37–47)
HGB BLD-MCNC: 11 G/DL (ref 12–16)
LYMPHOCYTES # BLD AUTO: 1.45 10*3/MM3 (ref 0.72–4.86)
LYMPHOCYTES # BLD MANUAL: 1.33 10*3/MM3 (ref 0.72–4.86)
LYMPHOCYTES NFR BLD AUTO: 7.7 % (ref 15–45)
LYMPHOCYTES NFR BLD MANUAL: 7 % (ref 15–45)
LYMPHOCYTES NFR BLD MANUAL: 9 % (ref 4–12)
MCH RBC QN AUTO: 30.6 PG (ref 28–32)
MCHC RBC AUTO-ENTMCNC: 34 G/DL (ref 33–36)
MCV RBC AUTO: 90.3 FL (ref 82–98)
MONOCYTES # BLD AUTO: 1.26 10*3/MM3 (ref 0.19–1.3)
MONOCYTES # BLD AUTO: 1.71 10*3/MM3 (ref 0.19–1.3)
MONOCYTES NFR BLD AUTO: 6.7 % (ref 4–12)
NEUTROPHILS # BLD AUTO: 15.59 10*3/MM3 (ref 1.87–8.4)
NEUTROPHILS # BLD AUTO: 15.76 10*3/MM3 (ref 1.87–8.4)
NEUTROPHILS NFR BLD AUTO: 83.8 % (ref 39–78)
NEUTROPHILS NFR BLD MANUAL: 82 % (ref 39–78)
PLAT MORPH BLD: NORMAL
PLATELET # BLD AUTO: 159 10*3/MM3 (ref 130–400)
PMV BLD AUTO: 11 FL (ref 6–12)
RBC # BLD AUTO: 3.59 10*6/MM3 (ref 4.2–5.4)
RBC MORPH BLD: NORMAL
RH BLD: POSITIVE
T&S EXPIRATION DATE: NORMAL
VARIANT LYMPHS NFR BLD MANUAL: 1 % (ref 0–5)
WBC MORPH BLD: NORMAL
WBC NRBC COR # BLD: 19.01 10*3/MM3 (ref 4.8–10.8)

## 2018-04-23 PROCEDURE — 86850 RBC ANTIBODY SCREEN: CPT | Performed by: OBSTETRICS & GYNECOLOGY

## 2018-04-23 PROCEDURE — 51703 INSERT BLADDER CATH COMPLEX: CPT

## 2018-04-23 PROCEDURE — 85027 COMPLETE CBC AUTOMATED: CPT | Performed by: OBSTETRICS & GYNECOLOGY

## 2018-04-23 PROCEDURE — 4A1HX4Z MONITORING OF PRODUCTS OF CONCEPTION, CARDIAC ELECTRICAL ACTIVITY, EXTERNAL APPROACH: ICD-10-PCS | Performed by: OBSTETRICS & GYNECOLOGY

## 2018-04-23 PROCEDURE — C1755 CATHETER, INTRASPINAL: HCPCS | Performed by: NURSE ANESTHETIST, CERTIFIED REGISTERED

## 2018-04-23 PROCEDURE — 86901 BLOOD TYPING SEROLOGIC RH(D): CPT | Performed by: OBSTETRICS & GYNECOLOGY

## 2018-04-23 PROCEDURE — 86900 BLOOD TYPING SEROLOGIC ABO: CPT | Performed by: OBSTETRICS & GYNECOLOGY

## 2018-04-23 PROCEDURE — 85007 BL SMEAR W/DIFF WBC COUNT: CPT | Performed by: OBSTETRICS & GYNECOLOGY

## 2018-04-23 PROCEDURE — 25010000002 FENTANYL CITRATE (PF) 250 MCG/5ML SOLUTION: Performed by: NURSE ANESTHETIST, CERTIFIED REGISTERED

## 2018-04-23 PROCEDURE — 88307 TISSUE EXAM BY PATHOLOGIST: CPT | Performed by: OBSTETRICS & GYNECOLOGY

## 2018-04-23 PROCEDURE — 25010000002 ROPIVACAINE PER 1 MG: Performed by: NURSE ANESTHETIST, CERTIFIED REGISTERED

## 2018-04-23 RX ORDER — ROPIVACAINE HYDROCHLORIDE 5 MG/ML
INJECTION, SOLUTION EPIDURAL; INFILTRATION; PERINEURAL AS NEEDED
Status: DISCONTINUED | OUTPATIENT
Start: 2018-04-23 | End: 2018-04-24 | Stop reason: SURG

## 2018-04-23 RX ORDER — SODIUM CHLORIDE 0.9 % (FLUSH) 0.9 %
1-10 SYRINGE (ML) INJECTION AS NEEDED
Status: DISCONTINUED | OUTPATIENT
Start: 2018-04-23 | End: 2018-04-23

## 2018-04-23 RX ORDER — ONDANSETRON 4 MG/1
4 TABLET, FILM COATED ORAL EVERY 6 HOURS PRN
Status: DISCONTINUED | OUTPATIENT
Start: 2018-04-23 | End: 2018-04-23

## 2018-04-23 RX ORDER — LIDOCAINE HYDROCHLORIDE AND EPINEPHRINE 15; 5 MG/ML; UG/ML
INJECTION, SOLUTION EPIDURAL AS NEEDED
Status: DISCONTINUED | OUTPATIENT
Start: 2018-04-23 | End: 2018-04-24 | Stop reason: SURG

## 2018-04-23 RX ORDER — ONDANSETRON 2 MG/ML
4 INJECTION INTRAMUSCULAR; INTRAVENOUS EVERY 6 HOURS PRN
Status: DISCONTINUED | OUTPATIENT
Start: 2018-04-23 | End: 2018-04-25 | Stop reason: HOSPADM

## 2018-04-23 RX ORDER — BISACODYL 10 MG
10 SUPPOSITORY, RECTAL RECTAL DAILY PRN
Status: DISCONTINUED | OUTPATIENT
Start: 2018-04-24 | End: 2018-04-25 | Stop reason: HOSPADM

## 2018-04-23 RX ORDER — ONDANSETRON 4 MG/1
4 TABLET, ORALLY DISINTEGRATING ORAL EVERY 6 HOURS PRN
Status: DISCONTINUED | OUTPATIENT
Start: 2018-04-23 | End: 2018-04-25 | Stop reason: HOSPADM

## 2018-04-23 RX ORDER — ROPIVACAINE HYDROCHLORIDE 2 MG/ML
INJECTION, SOLUTION EPIDURAL; INFILTRATION; PERINEURAL AS NEEDED
Status: DISCONTINUED | OUTPATIENT
Start: 2018-04-23 | End: 2018-04-24 | Stop reason: SURG

## 2018-04-23 RX ORDER — OXYTOCIN/RINGER'S LACTATE 20/1000 ML
999 PLASTIC BAG, INJECTION (ML) INTRAVENOUS ONCE
Status: DISCONTINUED | OUTPATIENT
Start: 2018-04-23 | End: 2018-04-23 | Stop reason: HOSPADM

## 2018-04-23 RX ORDER — FENTANYL CITRATE 50 UG/ML
INJECTION, SOLUTION INTRAMUSCULAR; INTRAVENOUS AS NEEDED
Status: DISCONTINUED | OUTPATIENT
Start: 2018-04-23 | End: 2018-04-24 | Stop reason: SURG

## 2018-04-23 RX ORDER — ONDANSETRON 2 MG/ML
4 INJECTION INTRAMUSCULAR; INTRAVENOUS EVERY 6 HOURS PRN
Status: DISCONTINUED | OUTPATIENT
Start: 2018-04-23 | End: 2018-04-23

## 2018-04-23 RX ORDER — METHYLERGONOVINE MALEATE 0.2 MG/ML
200 INJECTION INTRAVENOUS ONCE AS NEEDED
Status: DISCONTINUED | OUTPATIENT
Start: 2018-04-23 | End: 2018-04-25 | Stop reason: HOSPADM

## 2018-04-23 RX ORDER — EPHEDRINE SULFATE 50 MG/ML
5 INJECTION, SOLUTION INTRAVENOUS
Status: DISCONTINUED | OUTPATIENT
Start: 2018-04-23 | End: 2018-04-23

## 2018-04-23 RX ORDER — OXYCODONE HYDROCHLORIDE AND ACETAMINOPHEN 5; 325 MG/1; MG/1
1 TABLET ORAL EVERY 6 HOURS PRN
Status: DISCONTINUED | OUTPATIENT
Start: 2018-04-23 | End: 2018-04-23

## 2018-04-23 RX ORDER — METHYLERGONOVINE MALEATE 0.2 MG/ML
200 INJECTION INTRAVENOUS ONCE AS NEEDED
Status: DISCONTINUED | OUTPATIENT
Start: 2018-04-23 | End: 2018-04-23 | Stop reason: HOSPADM

## 2018-04-23 RX ORDER — SODIUM CHLORIDE 0.9 % (FLUSH) 0.9 %
1-10 SYRINGE (ML) INJECTION AS NEEDED
Status: DISCONTINUED | OUTPATIENT
Start: 2018-04-23 | End: 2018-04-25 | Stop reason: HOSPADM

## 2018-04-23 RX ORDER — TERBUTALINE SULFATE 1 MG/ML
0.25 INJECTION, SOLUTION SUBCUTANEOUS AS NEEDED
Status: DISCONTINUED | OUTPATIENT
Start: 2018-04-23 | End: 2018-04-23

## 2018-04-23 RX ORDER — CARBOPROST TROMETHAMINE 250 UG/ML
250 INJECTION, SOLUTION INTRAMUSCULAR ONCE AS NEEDED
Status: DISCONTINUED | OUTPATIENT
Start: 2018-04-23 | End: 2018-04-25 | Stop reason: HOSPADM

## 2018-04-23 RX ORDER — PROMETHAZINE HYDROCHLORIDE 25 MG/1
25 TABLET ORAL EVERY 6 HOURS PRN
Status: DISCONTINUED | OUTPATIENT
Start: 2018-04-23 | End: 2018-04-25 | Stop reason: HOSPADM

## 2018-04-23 RX ORDER — PROMETHAZINE HYDROCHLORIDE 12.5 MG/1
12.5 SUPPOSITORY RECTAL EVERY 6 HOURS PRN
Status: DISCONTINUED | OUTPATIENT
Start: 2018-04-23 | End: 2018-04-25 | Stop reason: HOSPADM

## 2018-04-23 RX ORDER — SODIUM CHLORIDE, SODIUM LACTATE, POTASSIUM CHLORIDE, CALCIUM CHLORIDE 600; 310; 30; 20 MG/100ML; MG/100ML; MG/100ML; MG/100ML
125 INJECTION, SOLUTION INTRAVENOUS CONTINUOUS
Status: DISCONTINUED | OUTPATIENT
Start: 2018-04-23 | End: 2018-04-23

## 2018-04-23 RX ORDER — LIDOCAINE HYDROCHLORIDE 15 MG/ML
INJECTION, SOLUTION EPIDURAL; INFILTRATION; INTRACAUDAL; PERINEURAL AS NEEDED
Status: DISCONTINUED | OUTPATIENT
Start: 2018-04-23 | End: 2018-04-24 | Stop reason: SURG

## 2018-04-23 RX ORDER — IBUPROFEN 800 MG/1
800 TABLET ORAL EVERY 8 HOURS PRN
Status: DISCONTINUED | OUTPATIENT
Start: 2018-04-23 | End: 2018-04-25 | Stop reason: HOSPADM

## 2018-04-23 RX ORDER — MISOPROSTOL 200 UG/1
600 TABLET ORAL ONCE AS NEEDED
Status: DISCONTINUED | OUTPATIENT
Start: 2018-04-23 | End: 2018-04-25 | Stop reason: HOSPADM

## 2018-04-23 RX ORDER — PRENATAL VIT/IRON FUM/FOLIC AC 27MG-0.8MG
1 TABLET ORAL DAILY
Status: DISCONTINUED | OUTPATIENT
Start: 2018-04-23 | End: 2018-04-25 | Stop reason: HOSPADM

## 2018-04-23 RX ORDER — LIDOCAINE HYDROCHLORIDE 10 MG/ML
5 INJECTION, SOLUTION EPIDURAL; INFILTRATION; INTRACAUDAL; PERINEURAL AS NEEDED
Status: DISCONTINUED | OUTPATIENT
Start: 2018-04-23 | End: 2018-04-23

## 2018-04-23 RX ORDER — ONDANSETRON 4 MG/1
4 TABLET, FILM COATED ORAL EVERY 6 HOURS PRN
Status: DISCONTINUED | OUTPATIENT
Start: 2018-04-23 | End: 2018-04-25 | Stop reason: HOSPADM

## 2018-04-23 RX ORDER — OXYTOCIN/0.9 % SODIUM CHLORIDE 30/500 ML
2-30 PLASTIC BAG, INJECTION (ML) INTRAVENOUS
Status: DISCONTINUED | OUTPATIENT
Start: 2018-04-23 | End: 2018-04-23

## 2018-04-23 RX ORDER — PROMETHAZINE HYDROCHLORIDE 25 MG/ML
12.5 INJECTION, SOLUTION INTRAMUSCULAR; INTRAVENOUS EVERY 6 HOURS PRN
Status: DISCONTINUED | OUTPATIENT
Start: 2018-04-23 | End: 2018-04-25 | Stop reason: HOSPADM

## 2018-04-23 RX ORDER — OXYTOCIN/RINGER'S LACTATE 20/1000 ML
125 PLASTIC BAG, INJECTION (ML) INTRAVENOUS AS NEEDED
Status: DISCONTINUED | OUTPATIENT
Start: 2018-04-23 | End: 2018-04-23 | Stop reason: HOSPADM

## 2018-04-23 RX ORDER — MISOPROSTOL 200 UG/1
800 TABLET ORAL AS NEEDED
Status: DISCONTINUED | OUTPATIENT
Start: 2018-04-23 | End: 2018-04-23 | Stop reason: HOSPADM

## 2018-04-23 RX ORDER — ONDANSETRON 4 MG/1
4 TABLET, ORALLY DISINTEGRATING ORAL EVERY 6 HOURS PRN
Status: DISCONTINUED | OUTPATIENT
Start: 2018-04-23 | End: 2018-04-23

## 2018-04-23 RX ORDER — CARBOPROST TROMETHAMINE 250 UG/ML
250 INJECTION, SOLUTION INTRAMUSCULAR AS NEEDED
Status: DISCONTINUED | OUTPATIENT
Start: 2018-04-23 | End: 2018-04-23 | Stop reason: HOSPADM

## 2018-04-23 RX ADMIN — SODIUM CHLORIDE, POTASSIUM CHLORIDE, SODIUM LACTATE AND CALCIUM CHLORIDE 1000 ML: 600; 310; 30; 20 INJECTION, SOLUTION INTRAVENOUS at 11:50

## 2018-04-23 RX ADMIN — ROPIVACAINE HYDROCHLORIDE 4 ML: 5 INJECTION, SOLUTION EPIDURAL; INFILTRATION; PERINEURAL at 13:30

## 2018-04-23 RX ADMIN — Medication 10 ML: at 20:40

## 2018-04-23 RX ADMIN — IBUPROFEN 800 MG: 800 TABLET, FILM COATED ORAL at 20:51

## 2018-04-23 RX ADMIN — LIDOCAINE HYDROCHLORIDE 3 ML: 15 INJECTION, SOLUTION EPIDURAL; INFILTRATION; INTRACAUDAL; PERINEURAL at 12:36

## 2018-04-23 RX ADMIN — OXYTOCIN-SODIUM CHLORIDE 0.9% IV SOLN 30 UNIT/500ML 2 MILLI-UNITS/MIN: 30-0.9/5 SOLUTION at 14:47

## 2018-04-23 RX ADMIN — FENTANYL CITRATE 50 MCG: 50 INJECTION INTRAMUSCULAR; INTRAVENOUS at 12:43

## 2018-04-23 RX ADMIN — SODIUM CHLORIDE, POTASSIUM CHLORIDE, SODIUM LACTATE AND CALCIUM CHLORIDE 125 ML/HR: 600; 310; 30; 20 INJECTION, SOLUTION INTRAVENOUS at 13:13

## 2018-04-23 RX ADMIN — LIDOCAINE HYDROCHLORIDE AND EPINEPHRINE 3 ML: 15; 5 INJECTION, SOLUTION EPIDURAL at 12:40

## 2018-04-23 RX ADMIN — ROPIVACAINE HYDROCHLORIDE 4 ML: 2 INJECTION, SOLUTION EPIDURAL; INFILTRATION at 13:30

## 2018-04-23 RX ADMIN — FENTANYL CITRATE 200 MCG: 50 INJECTION INTRAMUSCULAR; INTRAVENOUS at 12:45

## 2018-04-23 RX ADMIN — SODIUM CHLORIDE, POTASSIUM CHLORIDE, SODIUM LACTATE AND CALCIUM CHLORIDE 125 ML/HR: 600; 310; 30; 20 INJECTION, SOLUTION INTRAVENOUS at 12:20

## 2018-04-23 RX ADMIN — ROPIVACAINE HYDROCHLORIDE 5 ML: 5 INJECTION, SOLUTION EPIDURAL; INFILTRATION; PERINEURAL at 12:43

## 2018-04-23 RX ADMIN — ROPIVACAINE HYDROCHLORIDE 8 ML/HR: 2 INJECTION, SOLUTION EPIDURAL; INFILTRATION at 12:48

## 2018-04-23 RX ADMIN — LIDOCAINE HYDROCHLORIDE 3 ML: 15 INJECTION, SOLUTION EPIDURAL; INFILTRATION; INTRACAUDAL; PERINEURAL at 13:51

## 2018-04-23 RX ADMIN — LIDOCAINE HYDROCHLORIDE AND EPINEPHRINE 3 ML: 15; 5 INJECTION, SOLUTION EPIDURAL at 13:51

## 2018-04-23 RX ADMIN — ROPIVACAINE HYDROCHLORIDE 5 ML: 2 INJECTION, SOLUTION EPIDURAL; INFILTRATION at 12:43

## 2018-04-23 RX ADMIN — ROPIVACAINE HYDROCHLORIDE 6 ML: 5 INJECTION, SOLUTION EPIDURAL; INFILTRATION; PERINEURAL at 13:54

## 2018-04-23 RX ADMIN — ROPIVACAINE HYDROCHLORIDE 4 ML: 2 INJECTION, SOLUTION EPIDURAL; INFILTRATION at 13:54

## 2018-04-23 NOTE — ANESTHESIA PROCEDURE NOTES
Labor Epidural    Patient location during procedure: OB  Performed By  CRNA: DOROTHY WRIGHT  Preanesthetic Checklist  Completed: patient identified, site marked, surgical consent, pre-op evaluation, timeout performed, IV checked, risks and benefits discussed and monitors and equipment checked  Additional Notes  First epidural one sided block not resolved with additional bolus replaced with new epidural  Prep:  Pt Position:sitting  Sterile Tech:cap, gloves and sterile barrier  Prep:chlorhexidine gluconate and isopropyl alcohol  Monitoring:blood pressure monitoring and continuous pulse oximetry  Epidural Block Procedure:  Approach:midline  Guidance:palpation technique  Location:L2-L3  Needle Type:Tuohy  Needle Gauge:18 G  Aspiration:negative  Test Dose:negative  Post Assessment:  Dressing:occlusive dressing applied and secured with tape  Pt Tolerance:patient tolerated the procedure well with no apparent complications  Complications:no

## 2018-04-23 NOTE — ANESTHESIA PREPROCEDURE EVALUATION
Anesthesia Evaluation     Patient summary reviewed   no history of anesthetic complications:  NPO Solid Status: > 8 hours  NPO Liquid Status: > 4 hours           Airway   Mallampati: I  TM distance: >3 FB  Neck ROM: full  No difficulty expected  Dental - normal exam     Pulmonary - negative pulmonary ROS   Cardiovascular - normal exam  Exercise tolerance: excellent (>7 METS)    NYHA Classification: I        Neuro/Psych  (+) psychiatric history,     GI/Hepatic/Renal/Endo    (+)  GERD well controlled,      Musculoskeletal (-) negative ROS    Abdominal    Substance History - negative use     OB/GYN    (+) Pregnant,         Other - negative ROS           Phys Exam Other: Lab Results       Component                Value               Date                       WBC                      6.6                 01/17/2017                 HGB                      14.0                01/17/2017                 HCT                      39.2                01/17/2017                 MCV                      87.9                01/17/2017                 PLT                      184                 01/17/2017                          Anesthesia Plan    ASA 2     epidural     Anesthetic plan and risks discussed with patient.

## 2018-04-23 NOTE — ANESTHESIA PROCEDURE NOTES
Labor Epidural    Patient location during procedure: OB  Performed By  CRNA: DOROTHY WRIGHT  Preanesthetic Checklist  Completed: patient identified, site marked, surgical consent, pre-op evaluation, timeout performed, IV checked, risks and benefits discussed and monitors and equipment checked  Prep:  Pt Position:sitting  Sterile Tech:cap, gloves and sterile barrier  Prep:chlorhexidine gluconate and isopropyl alcohol  Monitoring:blood pressure monitoring and continuous pulse oximetry  Epidural Block Procedure:  Approach:midline  Guidance:palpation technique  Needle Type:Tuohy  Needle Gauge:18 G  Aspiration:negative  Test Dose:negative  Number of Attempts: 1  Post Assessment:  Dressing:occlusive dressing applied and secured with tape  Pt Tolerance:patient tolerated the procedure well with no apparent complications  Complications:no

## 2018-04-24 ENCOUNTER — APPOINTMENT (OUTPATIENT)
Dept: PREADMISSION TESTING | Facility: HOSPITAL | Age: 23
End: 2018-04-24

## 2018-04-24 LAB
HCT VFR BLD AUTO: 27.9 % (ref 37–47)
HGB BLD-MCNC: 9.3 G/DL (ref 12–16)

## 2018-04-24 PROCEDURE — 85014 HEMATOCRIT: CPT | Performed by: OBSTETRICS & GYNECOLOGY

## 2018-04-24 PROCEDURE — 85018 HEMOGLOBIN: CPT | Performed by: OBSTETRICS & GYNECOLOGY

## 2018-04-24 RX ORDER — FAMOTIDINE 20 MG/1
20 TABLET, FILM COATED ORAL 2 TIMES DAILY
Status: DISCONTINUED | OUTPATIENT
Start: 2018-04-24 | End: 2018-04-25 | Stop reason: HOSPADM

## 2018-04-24 RX ORDER — HYDROCODONE BITARTRATE AND ACETAMINOPHEN 5; 325 MG/1; MG/1
1 TABLET ORAL EVERY 6 HOURS PRN
Status: DISCONTINUED | OUTPATIENT
Start: 2018-04-24 | End: 2018-04-25 | Stop reason: HOSPADM

## 2018-04-24 RX ADMIN — IBUPROFEN 800 MG: 800 TABLET, FILM COATED ORAL at 17:06

## 2018-04-24 RX ADMIN — HYDROCODONE BITARTRATE AND ACETAMINOPHEN 1 TABLET: 5; 325 TABLET ORAL at 08:49

## 2018-04-24 RX ADMIN — HYDROCODONE BITARTRATE AND ACETAMINOPHEN 1 TABLET: 5; 325 TABLET ORAL at 17:06

## 2018-04-24 RX ADMIN — FAMOTIDINE 20 MG: 20 TABLET, FILM COATED ORAL at 18:21

## 2018-04-24 RX ADMIN — IBUPROFEN 800 MG: 800 TABLET, FILM COATED ORAL at 04:32

## 2018-04-24 NOTE — ANESTHESIA POSTPROCEDURE EVALUATION
Patient: Nai Golden    Procedure Summary     Date:  04/23/18 Room / Location:      Anesthesia Start:  1230 Anesthesia Stop:  1455    Procedure:  LABOR ANALGESIA Diagnosis:      Scheduled Providers:   Provider:  Franky Jara CRNA    Anesthesia Type:  epidural ASA Status:  2          Anesthesia Type: epidural  Last vitals  BP   103/66 (04/24/18 0830)   Temp   97.9 °F (36.6 °C) (04/24/18 0830)   Pulse   98 (04/24/18 0830)   Resp   18 (04/24/18 0830)     SpO2   100 % (04/24/18 0830)     Post Anesthesia Care and Evaluation    Patient location during evaluation: bedside  Patient participation: complete - patient participated  Level of consciousness: awake and alert  Pain management: adequate  Airway patency: patent  Anesthetic complications: No anesthetic complications    Cardiovascular status: acceptable  Respiratory status: acceptable  Hydration status: acceptable  Post Neuraxial Block status: Motor and sensory function returned to baseline and No signs or symptoms of PDPH  Comments: Blood pressure 103/66, pulse 98, temperature 97.9 °F (36.6 °C), temperature source Temporal Artery , resp. rate 18, last menstrual period 07/17/2017, SpO2 100 %, currently breastfeeding.    Pt discharged from PACU based on nicky score >8

## 2018-04-25 ENCOUNTER — ANESTHESIA (OUTPATIENT)
Dept: LABOR AND DELIVERY | Facility: HOSPITAL | Age: 23
End: 2018-04-25

## 2018-04-25 VITALS
HEART RATE: 92 BPM | TEMPERATURE: 98.3 F | SYSTOLIC BLOOD PRESSURE: 107 MMHG | DIASTOLIC BLOOD PRESSURE: 60 MMHG | RESPIRATION RATE: 18 BRPM | OXYGEN SATURATION: 99 %

## 2018-04-25 LAB
CYTO UR: NORMAL
LAB AP CASE REPORT: NORMAL
Lab: NORMAL
PATH REPORT.FINAL DX SPEC: NORMAL
PATH REPORT.GROSS SPEC: NORMAL

## 2018-04-25 RX ORDER — HYDROCODONE BITARTRATE AND ACETAMINOPHEN 5; 325 MG/1; MG/1
1 TABLET ORAL EVERY 6 HOURS PRN
Qty: 10 TABLET | Refills: 0 | Status: SHIPPED | OUTPATIENT
Start: 2018-04-25 | End: 2018-05-04

## 2018-04-25 RX ORDER — IBUPROFEN 800 MG/1
800 TABLET ORAL EVERY 8 HOURS PRN
Qty: 90 TABLET | Refills: 2 | Status: SHIPPED | OUTPATIENT
Start: 2018-04-25 | End: 2018-06-25

## 2018-04-25 RX ADMIN — FAMOTIDINE 20 MG: 20 TABLET, FILM COATED ORAL at 08:36

## 2018-04-25 RX ADMIN — IBUPROFEN 800 MG: 800 TABLET, FILM COATED ORAL at 02:21

## 2018-04-25 RX ADMIN — HYDROCODONE BITARTRATE AND ACETAMINOPHEN 1 TABLET: 5; 325 TABLET ORAL at 02:21

## 2018-04-25 RX ADMIN — IBUPROFEN 800 MG: 800 TABLET, FILM COATED ORAL at 11:05

## 2018-04-25 RX ADMIN — HYDROCODONE BITARTRATE AND ACETAMINOPHEN 1 TABLET: 5; 325 TABLET ORAL at 11:05

## 2018-06-25 ENCOUNTER — OFFICE VISIT (OUTPATIENT)
Dept: CARDIOLOGY | Facility: CLINIC | Age: 23
End: 2018-06-25

## 2018-06-25 VITALS
HEIGHT: 60 IN | DIASTOLIC BLOOD PRESSURE: 60 MMHG | BODY MASS INDEX: 23.75 KG/M2 | SYSTOLIC BLOOD PRESSURE: 100 MMHG | WEIGHT: 121 LBS | HEART RATE: 96 BPM | OXYGEN SATURATION: 98 %

## 2018-06-25 DIAGNOSIS — R00.0 SINUS TACHYCARDIA: ICD-10-CM

## 2018-06-25 DIAGNOSIS — K31.84 GASTROPARESIS: ICD-10-CM

## 2018-06-25 DIAGNOSIS — I95.1 ORTHOSTATIC HYPOTENSION: Primary | ICD-10-CM

## 2018-06-25 PROCEDURE — 99204 OFFICE O/P NEW MOD 45 MIN: CPT | Performed by: INTERNAL MEDICINE

## 2018-06-25 PROCEDURE — 93000 ELECTROCARDIOGRAM COMPLETE: CPT | Performed by: INTERNAL MEDICINE

## 2018-06-25 RX ORDER — PROMETHAZINE HYDROCHLORIDE 25 MG/1
25 TABLET ORAL EVERY 6 HOURS PRN
COMMUNITY
End: 2021-11-16

## 2018-06-25 NOTE — PROGRESS NOTES
Subjective:     Encounter Date:06/25/2018      Patient ID: Nai Golden is a 22 y.o. female strip orthostatic hypotension as well as sinus tachycardia along with irritable bowel syndrome and gastroparesis, presents to establish care.    Referring provider:  TERRA Silva    Reason for Referral: Orthostatic hypotension, establish care    Chief Complaint: Intermittent lightheadedness    Dizziness   This is a chronic problem. The problem occurs intermittently. The problem has been waxing and waning. Pertinent negatives include no abdominal pain, change in bowel habit, chest pain, chills, congestion, coughing, diaphoresis, fever, headaches, joint swelling, myalgias, nausea, neck pain, numbness, rash or vomiting. The symptoms are aggravated by standing. She has tried position changes and drinking for the symptoms. The treatment provided mild relief.   Palpitations    This is a chronic problem. The problem occurs intermittently. The problem has been waxing and waning. Exacerbated by: standing. Associated symptoms include dizziness. Pertinent negatives include no chest fullness, chest pain, coughing, diaphoresis, fever, irregular heartbeat, nausea, numbness, shortness of breath, syncope or vomiting. Treatments tried: fluids and compression. The treatment provided no relief. There is no history of drug use, heart disease, hyperthyroidism or a valve disorder.     This s a 22-year-old female who has a history of orthostatic hypotension, tachycardia.  She says that she has previously been evaluated by cardiology.  Holter monitor echocardiogram has been performed.  The patient was found to be orthostatic previously in office.  The patient was prescribed to increase oral intake, however this has been problematic as she has a history of gastroparesis and cannot take a large amount of liquids.  The patient was also recommended to use compression stockings.  She says that she did this for some period of  time but that did not help with her symptoms therefore she no longer uses.  The patient recently gave birth and has noticed that her symptoms of lightheadedness and dizziness, especially when standing have been somewhat worse.  She denies any recent syncopal episodes.  She has some palpitations which she says typically occur when she is lightheaded and dizzy.  These typically resolve in a timely fashion.  As stated, the patient is unable to tolerate significant amounts of by mouth intake due to her history of gastroparesis.  While the patient says that she is not markedly worse than usual, she may be somewhat worse in terms of more symptomatic.  She is basically here today, however, to establish care.    The following portions of the patient's history were reviewed and updated as appropriate: allergies, current medications, past family history, past medical history, past social history, past surgical history and problem list.     Past Medical History:   Diagnosis Date   • Anemia    • Depression    • Dyspepsia    • Samantha-Danlos syndrome     2018   • Gastroparesis    • GERD (gastroesophageal reflux disease)    • IBS (irritable bowel syndrome)    • Scoliosis deformity of spine 2017   • Sinus tachycardia    • Tachycardia      Past Surgical History:   Procedure Laterality Date   •  SECTION     • CHOLECYSTECTOMY     • ENDOSCOPY  2016   • ENDOSCOPY N/A 2017    Procedure: ESOPHAGOGASTRODUODENOSCOPY possible dilation ;  Surgeon: Franky Berman MD;  Location: [facility] ENDOSCOPY;  Service:    • TONSILLECTOMY         Current Outpatient Prescriptions:   •  Levonorgestrel (CRHIS) 13.5 MG intrauterine device IUD, 1 each by Intrauterine route 1 (One) Time., Disp: , Rfl:   •  promethazine (PHENERGAN) 25 MG tablet, Take 25 mg by mouth Every 6 (Six) Hours As Needed for Nausea or Vomiting., Disp: , Rfl:   •  raNITIdine (ZANTAC) 150 MG tablet, Take 1 tablet by mouth 2 (Two) Times a Day., Disp: 60 tablet, Rfl:  5  •  Sertraline HCl (ZOLOFT PO), Take 75 mg by mouth Daily., Disp: , Rfl:     Allergies   Allergen Reactions   • Compazine [Prochlorperazine Edisylate] Rash   • Other Other (See Comments)     Band-Aids causes welps and rash     • Percocet [Oxycodone-Acetaminophen] Rash     Social History   Substance Use Topics   • Smoking status: Never Smoker   • Smokeless tobacco: Never Used   • Alcohol use No     Family History   Problem Relation Age of Onset   • Hypertension Mother    • Hypertension Father    • Arthritis Father    • Hyperlipidemia Father    • Heart disease Father    • No Known Problems Sister    • No Known Problems Brother    • Diabetes Paternal Grandfather    • Hypertension Paternal Grandfather    • No Known Problems Sister    • No Known Problems Sister      Review of Systems   Constitution: Negative for chills, diaphoresis, fever, night sweats and weight loss.   HENT: Negative for congestion and hearing loss.    Eyes: Negative for blurred vision and pain.   Cardiovascular: Positive for palpitations. Negative for chest pain, claudication, dyspnea on exertion, irregular heartbeat, leg swelling, orthopnea, paroxysmal nocturnal dyspnea and syncope.   Respiratory: Negative for cough, hemoptysis, shortness of breath and wheezing.    Endocrine: Negative for cold intolerance, heat intolerance, polydipsia and polyuria.   Hematologic/Lymphatic: Negative for adenopathy and bleeding problem. Does not bruise/bleed easily.   Skin: Negative for color change, poor wound healing and rash.   Musculoskeletal: Negative for arthritis, back pain, joint pain, joint swelling, myalgias and neck pain.   Gastrointestinal: Negative for abdominal pain, change in bowel habit, constipation, diarrhea, heartburn, hematochezia, melena, nausea and vomiting.   Genitourinary: Negative for bladder incontinence, dysuria, frequency, hematuria and nocturia.   Neurological: Positive for dizziness and light-headedness. Negative for focal weakness,  headaches, loss of balance, numbness and seizures.   Psychiatric/Behavioral: Negative for altered mental status, memory loss and substance abuse.   Allergic/Immunologic: Negative for hives and persistent infections.         ECG 12 Lead  Date/Time: 6/25/2018 3:53 PM  Performed by: BORIS MONTERO  Authorized by: BORIS MONTERO   Comparison: compared with previous ECG from 4/13/2017  Similar to previous ECG  Rhythm: sinus rhythm  Rate: normal  Conduction: conduction normal  ST Segments: ST segments normal  T Waves: T waves normal  QRS axis: normal  Other: no other findings  Clinical impression: normal ECG               Objective:     Physical Exam   Constitutional: She is oriented to person, place, and time. Vital signs are normal. She appears well-developed and well-nourished. She is cooperative.  Non-toxic appearance. No distress.   HENT:   Head: Normocephalic and atraumatic.   Right Ear: External ear normal.   Left Ear: External ear normal.   Nose: Nose normal.   Mouth/Throat: Uvula is midline, oropharynx is clear and moist and mucous membranes are normal. Mucous membranes are not pale, not dry and not cyanotic. No oropharyngeal exudate.   Eyes: EOM and lids are normal. Pupils are equal, round, and reactive to light.   Neck: Normal range of motion. Neck supple. No hepatojugular reflux and no JVD present. Carotid bruit is not present. No tracheal deviation and no edema present. No thyroid mass and no thyromegaly present.   Cardiovascular: Normal rate, regular rhythm, S1 normal, S2 normal, normal heart sounds, intact distal pulses and normal pulses.   No extrasystoles are present. PMI is not displaced.  Exam reveals no gallop and no friction rub.    No murmur heard.  Pulses:       Radial pulses are 2+ on the right side, and 2+ on the left side.        Femoral pulses are 2+ on the right side, and 2+ on the left side.       Dorsalis pedis pulses are 2+ on the right side, and 2+ on the left side.         "Posterior tibial pulses are 2+ on the right side, and 2+ on the left side.   Pulmonary/Chest: Effort normal and breath sounds normal. No accessory muscle usage. No respiratory distress. She has no wheezes. She has no rales. She exhibits no tenderness.   Abdominal: Soft. Normal appearance and bowel sounds are normal. She exhibits no distension, no abdominal bruit and no pulsatile midline mass. There is no hepatosplenomegaly. There is no tenderness.   Musculoskeletal: Normal range of motion. She exhibits no edema, tenderness or deformity.   Lymphadenopathy:     She has no cervical adenopathy.   Neurological: She is oriented to person, place, and time. She has normal strength. No cranial nerve deficit.   Skin: Skin is warm, dry and intact. No rash noted. She is not diaphoretic. No cyanosis or erythema. Nails show no clubbing.   Psychiatric: She has a normal mood and affect. Her speech is normal and behavior is normal. Thought content normal.   Vitals reviewed.    /60 (BP Location: Left arm, Patient Position: Sitting)   Pulse 96   Ht 151.1 cm (59.5\")   Wt 54.9 kg (121 lb)   LMP 07/17/2017   SpO2 98%   BMI 24.03 kg/m²     Lab Review:     Echo 5/15/17:  · Left ventricular systolic function is normal.  · Calculated EF = 59.3%  · All left ventricular wall segments contract normally  · Left ventricular diastolic function is normal    Holter 5/15/17:  · A relatively benign monitor study.  · The predominant rhythm noted during the testing period was sinus tachycardia      Assessment:          Diagnosis Plan   1. Orthostatic hypotension  ECG 12 Lead   2. Sinus tachycardia     3. Gastroparesis            Plan:       1.  Orthostatic hypotension: The patient has a long-standing history of this.  She has previously been evaluated by cardiology.  At this time, I think the patient probably does have some type of dysautonomia.  I have, as has her previous cardiologist, encouraged increased by mouth intake of fluids, at " least as much as tolerated given this patient's history of gastroparesis.  In addition, the use of compression stockings would be recommended, even if it is only marginally helpful.  I did discuss possible referral to the dysautonomia clinic at [place].  The patient would like to continue to be monitored here for the time being but may willing for consultation there in the future.    2.  Sinus tachycardia: The seems that the patient has sinus tachycardia is a reflex to orthostatic hypotension.  The Holter monitor back about a year ago is referenced above with no significant arrhythmias.  Once again, I have encouraged increased oral intake and the use of compression stockings to help with orthostatic hypotension, which may in turn help with the patient's sinus tachycardia.    3.  Gastroparesis: It seems that the patient's gastroparesis does limit her ability to take oral intake.  I have encouraged, at least to the best of the patient's ability, her to increase her oral intake to some degree.    Follow-up: 3 months unless otherwise needed sooner.

## 2018-09-19 ENCOUNTER — OFFICE VISIT (OUTPATIENT)
Dept: OBGYN | Age: 23
End: 2018-09-19
Payer: COMMERCIAL

## 2018-09-19 VITALS
DIASTOLIC BLOOD PRESSURE: 60 MMHG | WEIGHT: 107 LBS | BODY MASS INDEX: 21.01 KG/M2 | HEIGHT: 60 IN | SYSTOLIC BLOOD PRESSURE: 104 MMHG

## 2018-09-19 DIAGNOSIS — R20.0 LOSS OF FEELING OR SENSATION: Primary | ICD-10-CM

## 2018-09-19 DIAGNOSIS — N94.819 VULVODYNIA: ICD-10-CM

## 2018-09-19 PROCEDURE — G8420 CALC BMI NORM PARAMETERS: HCPCS | Performed by: OBSTETRICS & GYNECOLOGY

## 2018-09-19 PROCEDURE — G8427 DOCREV CUR MEDS BY ELIG CLIN: HCPCS | Performed by: OBSTETRICS & GYNECOLOGY

## 2018-09-19 PROCEDURE — 99214 OFFICE O/P EST MOD 30 MIN: CPT | Performed by: OBSTETRICS & GYNECOLOGY

## 2018-09-19 PROCEDURE — 1036F TOBACCO NON-USER: CPT | Performed by: OBSTETRICS & GYNECOLOGY

## 2018-09-19 RX ORDER — PROMETHAZINE HYDROCHLORIDE 25 MG/1
25 TABLET ORAL
Status: ON HOLD | COMMUNITY
End: 2020-04-24 | Stop reason: HOSPADM

## 2018-09-19 NOTE — PROGRESS NOTES
Pt is complains of not being able to feel anything during intercourse. She denies pain during intercourse. Complains of bleeding with intercourse since delivery of baby ion April. She uses lubrication & had Taylor IUD. Pt would like tubes removed as opposed to TL.
HISTORY  2018        TONSILLECTOMY       Family History   Problem Relation Age of Onset    High Blood Pressure Father     Diabetes Father         Borderline    Heart Attack Father     Other Mother         High grade dysplasia on pap smear    Diabetes Paternal Grandfather      Social History   Substance Use Topics    Smoking status: Never Smoker    Smokeless tobacco: Never Used    Alcohol use No     /60   Ht 4' 11.5\" (1.511 m)   Wt 107 lb (48.5 kg)   LMP 2018   Breastfeeding? Yes   BMI 21.25 kg/m²     Objective:   Physical Exam   Constitutional: She is oriented to person, place, and time. She appears well-developed and well-nourished. No distress. HENT:   Head: Normocephalic and atraumatic. Eyes: Conjunctivae and EOM are normal. Right eye exhibits no discharge. Left eye exhibits no discharge. No scleral icterus. Neck: Normal range of motion. Pulmonary/Chest: Effort normal. No respiratory distress. Genitourinary: There is no rash, tenderness, lesion or injury on the right labia. There is no rash, tenderness, lesion or injury on the left labia. No erythema, tenderness or bleeding in the vagina. No foreign body in the vagina. No signs of injury around the vagina. No vaginal discharge found. Genitourinary Comments: No sharp or dull sensation on vulva or within vaginal canal   Musculoskeletal: Normal range of motion. She exhibits edema. She exhibits no tenderness. Neurological: She is alert and oriented to person, place, and time. Skin: Skin is warm and dry. No rash noted. Psychiatric: She has a normal mood and affect. Her speech is normal and behavior is normal. Judgment and thought content normal. Cognition and memory are normal.       Assessment:       Diagnosis Orders   1. Loss of feeling or sensation  MRI Lumbar Spine W WO Contrast   2. Vulvodynia             Plan:      1. MRI of lumbar spine to assess for injury or abnormality  2.   Will refer to vulvar

## 2018-10-02 ENCOUNTER — TELEPHONE (OUTPATIENT)
Dept: OBGYN | Age: 23
End: 2018-10-02

## 2018-10-02 ASSESSMENT — ENCOUNTER SYMPTOMS
ALLERGIC/IMMUNOLOGIC NEGATIVE: 1
GASTROINTESTINAL NEGATIVE: 1
EYES NEGATIVE: 1
RESPIRATORY NEGATIVE: 1

## 2018-10-03 ENCOUNTER — TELEPHONE (OUTPATIENT)
Dept: OBGYN | Age: 23
End: 2018-10-03

## 2018-10-03 DIAGNOSIS — R20.0 LOSS OF FEELING OR SENSATION: Primary | ICD-10-CM

## 2019-01-28 ENCOUNTER — OFFICE VISIT (OUTPATIENT)
Dept: GASTROENTEROLOGY | Facility: CLINIC | Age: 24
End: 2019-01-28

## 2019-01-28 ENCOUNTER — TELEPHONE (OUTPATIENT)
Dept: GASTROENTEROLOGY | Facility: CLINIC | Age: 24
End: 2019-01-28

## 2019-01-28 VITALS
SYSTOLIC BLOOD PRESSURE: 116 MMHG | HEART RATE: 127 BPM | OXYGEN SATURATION: 97 % | DIASTOLIC BLOOD PRESSURE: 64 MMHG | WEIGHT: 104 LBS | TEMPERATURE: 97 F | HEIGHT: 60 IN | BODY MASS INDEX: 20.42 KG/M2

## 2019-01-28 DIAGNOSIS — R19.8 ALTERED BOWEL FUNCTION: ICD-10-CM

## 2019-01-28 DIAGNOSIS — Q79.60 EHLERS-DANLOS SYNDROME: ICD-10-CM

## 2019-01-28 DIAGNOSIS — R11.0 NAUSEA: ICD-10-CM

## 2019-01-28 DIAGNOSIS — K31.84 GASTROPARESIS: Primary | ICD-10-CM

## 2019-01-28 PROCEDURE — 99214 OFFICE O/P EST MOD 30 MIN: CPT | Performed by: INTERNAL MEDICINE

## 2019-01-28 NOTE — PROGRESS NOTES
"Chief Complaint   Patient presents with   • GI Problem     has gastroparesis nausea acid reflux       PCP: Ju Garibay APRN  REFER: No ref. provider found    Subjective     HPI    Diagnosed with gastroparesis within past 2-3 years.  She is followed by motility specialist in [place].  Takes Phenergan prn but due to sedating side effects she does not take medication regular.  Zofran caused constipation.  States she \"does not have a good day.\" Nausea is present daily.  Nausea worse after eating.  She is 9 months postpartum, over past 3 months 5 lb weight loss due to nausea.  She does not have difficulty with nausea during pregnancy.   Gastroparesis is felt to be r/t Ehler's Danlos syndrome.  Last eval at [place] motility in 2018.  They have recommended domperidone, however, she has not been able to get medication.  She is debating gastric pace maker.  Bowels described as moving 2-3 times per week.  She will go days without BM then experience large amount of stool.  Miralax has not provided relief.  No previous colonoscopy.  EGD  with Dr Berman.  Zantac bid works better than PPI.  She stopped Reglan due to dyskinesia onset     Past Medical History:   Diagnosis Date   • Anemia    • Depression    • Dyspepsia    • Samantha-Danlos syndrome     2018   • Gastroparesis    • GERD (gastroesophageal reflux disease)    • IBS (irritable bowel syndrome)    • Scoliosis deformity of spine 2017   • Sinus tachycardia    • Tachycardia        Past Surgical History:   Procedure Laterality Date   •  SECTION     • CHOLECYSTECTOMY     • ENDOSCOPY  2016   • ENDOSCOPY N/A 2017    Procedure: ESOPHAGOGASTRODUODENOSCOPY possible dilation ;  Surgeon: Franky Berman MD;  Location: [facility] ENDOSCOPY;  Service:    • TONSILLECTOMY         Outpatient Medications Marked as Taking for the 19 encounter (Office Visit) with Ronaldo Cole, DO   Medication Sig Dispense Refill   • Levonorgestrel " (CHRIS) 13.5 MG intrauterine device IUD 1 each by Intrauterine route 1 (One) Time.     • promethazine (PHENERGAN) 25 MG tablet Take 25 mg by mouth Every 6 (Six) Hours As Needed for Nausea or Vomiting.     • raNITIdine (ZANTAC) 150 MG tablet Take 1 tablet by mouth 2 (Two) Times a Day. 60 tablet 5   • Sertraline HCl (ZOLOFT PO) Take 75 mg by mouth Daily.         Allergies   Allergen Reactions   • Compazine [Prochlorperazine Edisylate] Rash   • Other Other (See Comments)     Band-Aids causes welps and rash     • Percocet [Oxycodone-Acetaminophen] Rash       Social History     Socioeconomic History   • Marital status:      Spouse name: Not on file   • Number of children: Not on file   • Years of education: Not on file   • Highest education level: Not on file   Social Needs   • Financial resource strain: Not on file   • Food insecurity - worry: Not on file   • Food insecurity - inability: Not on file   • Transportation needs - medical: Not on file   • Transportation needs - non-medical: Not on file   Occupational History   • Not on file   Tobacco Use   • Smoking status: Never Smoker   • Smokeless tobacco: Never Used   Substance and Sexual Activity   • Alcohol use: No   • Drug use: No   • Sexual activity: Yes     Partners: Male   Other Topics Concern   • Not on file   Social History Narrative   • Not on file       Family History   Problem Relation Age of Onset   • Hypertension Mother    • Hypertension Father    • Arthritis Father    • Hyperlipidemia Father    • Heart disease Father    • No Known Problems Sister    • No Known Problems Brother    • Diabetes Paternal Grandfather    • Hypertension Paternal Grandfather    • No Known Problems Sister    • No Known Problems Sister    • Colon polyps Neg Hx    • Colon cancer Neg Hx    • Esophageal cancer Neg Hx        Review of Systems   Constitutional: Negative for fatigue, fever and unexpected weight change.   HENT: Negative for hearing loss, sore throat and voice change.   "  Eyes: Negative for visual disturbance.   Respiratory: Negative for cough, shortness of breath and wheezing.    Cardiovascular: Negative for chest pain and palpitations.   Gastrointestinal: Positive for nausea. Negative for abdominal pain, blood in stool and vomiting.   Endocrine: Negative for polydipsia and polyuria.   Genitourinary: Negative for difficulty urinating, dysuria, hematuria and urgency.   Musculoskeletal: Negative for joint swelling and myalgias.   Skin: Negative for color change, rash and wound.   Neurological: Negative for dizziness, tremors, seizures and syncope.   Hematological: Does not bruise/bleed easily.   Psychiatric/Behavioral: Negative for agitation and confusion. The patient is not nervous/anxious.        Objective     Vitals:    01/28/19 1337   BP: 116/64   Pulse: (!) 127   Temp: 97 °F (36.1 °C)   SpO2: 97%   Weight: 47.2 kg (104 lb)   Height: 151.1 cm (59.5\")     Body mass index is 20.65 kg/m².    Physical Exam   Constitutional: She is oriented to person, place, and time. She appears well-developed and well-nourished. She is cooperative.   HENT:   Head: Normocephalic and atraumatic.   Eyes: Conjunctivae are normal. Pupils are equal, round, and reactive to light. No scleral icterus.   Neck: Normal range of motion. Neck supple. No JVD present. No thyroid mass and no thyromegaly present.   Cardiovascular: Normal rate, regular rhythm and normal heart sounds. Exam reveals no gallop and no friction rub.   No murmur heard.  Pulmonary/Chest: Effort normal and breath sounds normal. No accessory muscle usage. No respiratory distress. She has no wheezes. She has no rales.   Abdominal: Soft. Normal appearance and bowel sounds are normal. She exhibits no distension, no ascites and no mass. There is no hepatosplenomegaly. There is tenderness (BART caused nausea). There is no rebound and no guarding.   Musculoskeletal: Normal range of motion. She exhibits no edema or tenderness.     Vascular Status -  " Her right foot exhibits normal foot vasculature  and no edema. Her left foot exhibits normal foot vasculature  and no edema.  Lymphadenopathy:     She has no cervical adenopathy.   Neurological: She is alert and oriented to person, place, and time. She has normal strength. Gait normal.   Skin: Skin is warm, dry and intact. No rash noted.       Imaging Results (most recent)     None          Body mass index is 20.65 kg/m².    Assessment/Plan     Nai was seen today for gi problem.    Diagnoses and all orders for this visit:    Gastroparesis    Altered bowel function    Nausea  -     Case Request; Standing  -     Implement Anesthesia Orders Day of Procedure; Standing  -     Obtain Informed Consent; Standing  -     Case Request    Samantha-Danlos syndrome        ESOPHAGOGASTRODUODENOSCOPY WITH ANESTHESIA (N/A)      Recommend Miralax bid or MOM tablets  Recommend official diagnosis, suggest follow up with Dr Jiménez (referral placed)  No further anti nausea medications to offer      There are no Patient Instructions on file for this visit.

## 2019-01-29 PROBLEM — R11.0 NAUSEA: Status: ACTIVE | Noted: 2019-01-29

## 2019-02-04 ENCOUNTER — TELEPHONE (OUTPATIENT)
Dept: GASTROENTEROLOGY | Facility: CLINIC | Age: 24
End: 2019-02-04

## 2019-02-04 NOTE — TELEPHONE ENCOUNTER
Pt cx procedure due to CO- PAY.     PT claims she recd a phone from the hospital asking for her CO- PAY for her Endoscopy.     Pt told them she didn't have the money right now.     Pt contact the office and canceled her procedure. She is worried insurance is not going to pay for procedure.     Do you want me to send a letter to pcp/ referring doctor?

## 2019-02-28 ENCOUNTER — OFFICE VISIT (OUTPATIENT)
Dept: UROLOGY | Age: 24
End: 2019-02-28
Payer: COMMERCIAL

## 2019-02-28 VITALS
SYSTOLIC BLOOD PRESSURE: 114 MMHG | DIASTOLIC BLOOD PRESSURE: 81 MMHG | RESPIRATION RATE: 18 BRPM | HEART RATE: 123 BPM | TEMPERATURE: 97.5 F | HEIGHT: 59 IN | BODY MASS INDEX: 20.96 KG/M2 | WEIGHT: 104 LBS

## 2019-02-28 DIAGNOSIS — N39.46 MIXED INCONTINENCE: Primary | ICD-10-CM

## 2019-02-28 DIAGNOSIS — R32 INCONTINENCE IN FEMALE: ICD-10-CM

## 2019-02-28 LAB
BACTERIA URINE, POC: ABNORMAL
BILIRUBIN URINE: ABNORMAL MG/DL
BLOOD, URINE: POSITIVE
CASTS URINE, POC: ABNORMAL
CLARITY: CLEAR
COLOR: YELLOW
CRYSTALS URINE, POC: ABNORMAL
EPI CELLS URINE, POC: ABNORMAL
GLUCOSE URINE: NEGATIVE
KETONES, URINE: NEGATIVE
LEUKOCYTE EST, POC: NEGATIVE
NITRITE, URINE: NEGATIVE
PH UA: 7 (ref 4.5–8)
PROTEIN UA: NEGATIVE
RBC URINE, POC: ABNORMAL
SPECIFIC GRAVITY UA: 1.02 (ref 1–1.03)
UROBILINOGEN, URINE: NORMAL
WBC URINE, POC: ABNORMAL
YEAST URINE, POC: ABNORMAL

## 2019-02-28 PROCEDURE — G8427 DOCREV CUR MEDS BY ELIG CLIN: HCPCS | Performed by: NURSE PRACTITIONER

## 2019-02-28 PROCEDURE — 99203 OFFICE O/P NEW LOW 30 MIN: CPT | Performed by: NURSE PRACTITIONER

## 2019-02-28 PROCEDURE — G8420 CALC BMI NORM PARAMETERS: HCPCS | Performed by: NURSE PRACTITIONER

## 2019-02-28 PROCEDURE — 1036F TOBACCO NON-USER: CPT | Performed by: NURSE PRACTITIONER

## 2019-02-28 PROCEDURE — G8484 FLU IMMUNIZE NO ADMIN: HCPCS | Performed by: NURSE PRACTITIONER

## 2019-02-28 PROCEDURE — 81001 URINALYSIS AUTO W/SCOPE: CPT | Performed by: NURSE PRACTITIONER

## 2019-03-14 ENCOUNTER — PROCEDURE VISIT (OUTPATIENT)
Dept: UROLOGY | Age: 24
End: 2019-03-14

## 2019-03-14 DIAGNOSIS — N39.3 STRESS INCONTINENCE: ICD-10-CM

## 2019-03-19 PROBLEM — N39.3 STRESS INCONTINENCE: Status: ACTIVE | Noted: 2019-03-19

## 2019-04-01 ENCOUNTER — OFFICE VISIT (OUTPATIENT)
Dept: UROLOGY | Age: 24
End: 2019-04-01
Payer: COMMERCIAL

## 2019-04-01 VITALS — TEMPERATURE: 96.6 F

## 2019-04-01 DIAGNOSIS — N39.46 MIXED INCONTINENCE: Primary | ICD-10-CM

## 2019-04-01 PROCEDURE — 99214 OFFICE O/P EST MOD 30 MIN: CPT | Performed by: UROLOGY

## 2019-04-01 PROCEDURE — 1036F TOBACCO NON-USER: CPT | Performed by: UROLOGY

## 2019-04-01 PROCEDURE — G8427 DOCREV CUR MEDS BY ELIG CLIN: HCPCS | Performed by: UROLOGY

## 2019-04-01 PROCEDURE — G8420 CALC BMI NORM PARAMETERS: HCPCS | Performed by: UROLOGY

## 2019-04-01 ASSESSMENT — ENCOUNTER SYMPTOMS
SHORTNESS OF BREATH: 0
BACK PAIN: 0
VOMITING: 0
SINUS PAIN: 0
EYE PAIN: 0
ABDOMINAL PAIN: 0
WHEEZING: 0

## 2019-04-01 NOTE — PROGRESS NOTES
Juan Luis Alatorre is a 26 y.o. female who presents today   Chief Complaint   Patient presents with    Follow-up     I'm here to follow up for results from recent urodynamic UDS procedure. Urinary Incontinence:  Patient is here today for urinary incontinence which started approximately 6 years(s) ago. Recently the urinary incontinence symptoms: are worsening, since her last child was born approximately 1 year ago  Current medical Rx for incontinence: none  Stress incontinence: Severity = mild. Urge Incontinence:  Severity = moderate. Number of pads per day: 1  Frequency: 2 hours  She also complains of urethral and anal sphincter spasms and loss of vaginal sensation. She underwent urodynamics and now returns for follow-up. Past Medical History:   Diagnosis Date    Anxiety     Eczema     Andreas-Danlos syndrome 2017    Gastritis     Gastroparesis     GERD (gastroesophageal reflux disease)     History of chicken pox     IBS (irritable bowel syndrome)     Postural orthostatic tachycardia syndrome 2017    Stress incontinence 3/19/2019       Past Surgical History:   Procedure Laterality Date     SECTION      x 1    GALLBLADDER SURGERY  2014    OTHER SURGICAL HISTORY  2018        TONSILLECTOMY         Current Outpatient Medications   Medication Sig Dispense Refill    sertraline (ZOLOFT) 50 MG tablet Take 75 mg by mouth daily      promethazine (PHENERGAN) 25 MG tablet Take 25 mg by mouth      levonorgestrel (YOHANA) IUD 13.5 mg 1 each by Intrauterine route once       No current facility-administered medications for this visit.         Allergies   Allergen Reactions    Percocet [Oxycodone-Acetaminophen]     Prochlorperazine        Social History     Socioeconomic History    Marital status:      Spouse name: None    Number of children: None    Years of education: None    Highest education level: None   Occupational History    None   Social Needs    Financial resource strain: None    Food insecurity:     Worry: None     Inability: None    Transportation needs:     Medical: None     Non-medical: None   Tobacco Use    Smoking status: Never Smoker    Smokeless tobacco: Never Used   Substance and Sexual Activity    Alcohol use: No    Drug use: No    Sexual activity: Yes     Partners: Male   Lifestyle    Physical activity:     Days per week: None     Minutes per session: None    Stress: None   Relationships    Social connections:     Talks on phone: None     Gets together: None     Attends Gnosticism service: None     Active member of club or organization: None     Attends meetings of clubs or organizations: None     Relationship status: None    Intimate partner violence:     Fear of current or ex partner: None     Emotionally abused: None     Physically abused: None     Forced sexual activity: None   Other Topics Concern    None   Social History Narrative    None       Family History   Problem Relation Age of Onset    High Blood Pressure Father     Diabetes Father         Borderline    Heart Attack Father     Other Mother         High grade dysplasia on pap smear    Diabetes Paternal Grandfather        REVIEW OF SYSTEMS:  Review of Systems   HENT: Negative for nosebleeds and sinus pain. Eyes: Negative for pain. Respiratory: Negative for shortness of breath and wheezing. Cardiovascular: Negative for palpitations and leg swelling. Gastrointestinal: Negative for abdominal pain and vomiting. Endocrine: Negative for polydipsia. Genitourinary: Negative for dysuria, flank pain, frequency, hematuria and urgency. Musculoskeletal: Negative for back pain and myalgias. Skin: Negative for rash. Allergic/Immunologic: Negative for environmental allergies. Neurological: Negative for tremors. Psychiatric/Behavioral: Negative for hallucinations. The patient is not nervous/anxious.         PHYSICAL EXAM:  Temp 96.6 °F (35.9 °C) (Temporal) Physical Exam   Constitutional: She is oriented to person, place, and time. She appears well-developed and well-nourished. HENT:   Head: Normocephalic and atraumatic. Eyes: Pupils are equal, round, and reactive to light. Conjunctivae and EOM are normal. No scleral icterus. Neck: Normal range of motion. Neck supple. Cardiovascular: Normal rate, regular rhythm and intact distal pulses. Pulmonary/Chest: Effort normal and breath sounds normal. No respiratory distress. She exhibits no tenderness. Abdominal: Soft. She exhibits no distension and no mass. There is no tenderness. Musculoskeletal: Normal range of motion. She exhibits no edema or tenderness. Lymphadenopathy:     She has no cervical adenopathy. Neurological: She is alert and oriented to person, place, and time. Skin: Skin is warm and dry. Psychiatric: She has a normal mood and affect. Her behavior is normal.   Vitals reviewed. DATA:     Urodynamics was reviewed and this shows on micturition study normal voiding without obstruction. On the filling phase of the CMG there is increase sestamibi but normal capacity and compliance no detrusor overactivity demonstrated. There was stress urinary incontinence demonstrated with abdominal leak point pressure 67 cm of water with Valsalva. 1. Mixed incontinence  She would benefit from pelvic floor rehabilitation/Keagle exercises. In addition I think she could benefit at some point from anti-: Medication but she is currently nursing her baby and I would recommend not putting her on this until she is done nursing. I think given her lack of vaginal sensation in her other localized symptoms neurologic evaluation is also indicated she says she has an appointment with a neurologist who was referred for some sort of cardiac trouble by vascular to speak the neurologist bladder voiding and anal symptoms as well.  Otherwise we'll see her back in 4 months if she is done breast-feeding and out of start her on anticholinergic. She does have symptoms of stress incontinence but I would avoid anti-incontinence surgery given her young age at this point      No orders of the defined types were placed in this encounter. Return in about 4 months (around 8/1/2019) for Follow-up with Brigida Fernandez next visit. EMR Dragon/transcription disclaimer: Much of this documentt is electronic  transcription/translation of spoken language to printed text. The  electronic translation of spoken language may be erroneous, or at times,  nonsensical words or phrases may be inadvertently transcribed.  Although I  have reviewed the document for such errors, some may still exist.

## 2019-05-17 ENCOUNTER — OFFICE VISIT (OUTPATIENT)
Dept: NEUROLOGY | Facility: CLINIC | Age: 24
End: 2019-05-17

## 2019-05-17 VITALS
DIASTOLIC BLOOD PRESSURE: 70 MMHG | HEART RATE: 115 BPM | HEIGHT: 60 IN | RESPIRATION RATE: 18 BRPM | SYSTOLIC BLOOD PRESSURE: 132 MMHG | OXYGEN SATURATION: 98 % | BODY MASS INDEX: 20.42 KG/M2 | WEIGHT: 104 LBS

## 2019-05-17 DIAGNOSIS — G37.9 DEMYELINATING DISEASE OF CENTRAL NERVOUS SYSTEM, UNSPECIFIED (HCC): Primary | ICD-10-CM

## 2019-05-17 DIAGNOSIS — G95.9 MYELOPATHY (HCC): ICD-10-CM

## 2019-05-17 DIAGNOSIS — R32 URINARY INCONTINENCE, UNSPECIFIED TYPE: ICD-10-CM

## 2019-05-17 DIAGNOSIS — I95.1 ORTHOSTATIC HYPOTENSION: ICD-10-CM

## 2019-05-17 DIAGNOSIS — R29.898 WEAKNESS OF BOTH LOWER EXTREMITIES: ICD-10-CM

## 2019-05-17 PROCEDURE — 99204 OFFICE O/P NEW MOD 45 MIN: CPT | Performed by: PHYSICIAN ASSISTANT

## 2019-06-07 ENCOUNTER — HOSPITAL ENCOUNTER (OUTPATIENT)
Dept: MRI IMAGING | Facility: HOSPITAL | Age: 24
Discharge: HOME | End: 2019-06-07
Admitting: PHYSICIAN ASSISTANT

## 2019-06-07 ENCOUNTER — HOSPITAL ENCOUNTER (OUTPATIENT)
Dept: MRI IMAGING | Facility: HOSPITAL | Age: 24
Discharge: HOME | End: 2019-06-07

## 2019-06-07 DIAGNOSIS — G37.9 DEMYELINATING DISEASE OF CENTRAL NERVOUS SYSTEM, UNSPECIFIED (HCC): ICD-10-CM

## 2019-06-07 DIAGNOSIS — R29.898 WEAKNESS OF BOTH LOWER EXTREMITIES: ICD-10-CM

## 2019-06-07 DIAGNOSIS — G95.9 MYELOPATHY (HCC): ICD-10-CM

## 2019-06-07 DIAGNOSIS — R32 URINARY INCONTINENCE, UNSPECIFIED TYPE: ICD-10-CM

## 2019-06-07 PROCEDURE — 70553 MRI BRAIN STEM W/O & W/DYE: CPT

## 2019-06-07 PROCEDURE — 0 GADOBENATE DIMEGLUMINE 529 MG/ML SOLUTION: Performed by: PHYSICIAN ASSISTANT

## 2019-06-07 PROCEDURE — 72156 MRI NECK SPINE W/O & W/DYE: CPT

## 2019-06-07 PROCEDURE — A9577 INJ MULTIHANCE: HCPCS | Performed by: PHYSICIAN ASSISTANT

## 2019-06-07 RX ADMIN — GADOBENATE DIMEGLUMINE 9 ML: 529 INJECTION, SOLUTION INTRAVENOUS at 14:48

## 2019-06-10 ENCOUNTER — HOSPITAL ENCOUNTER (OUTPATIENT)
Dept: MRI IMAGING | Facility: HOSPITAL | Age: 24
Discharge: HOME | End: 2019-06-10
Admitting: PHYSICIAN ASSISTANT

## 2019-06-10 DIAGNOSIS — G95.9 MYELOPATHY (HCC): ICD-10-CM

## 2019-06-10 DIAGNOSIS — R32 URINARY INCONTINENCE, UNSPECIFIED TYPE: ICD-10-CM

## 2019-06-10 DIAGNOSIS — R29.898 WEAKNESS OF BOTH LOWER EXTREMITIES: ICD-10-CM

## 2019-06-10 DIAGNOSIS — G37.9 DEMYELINATING DISEASE OF CENTRAL NERVOUS SYSTEM, UNSPECIFIED (HCC): ICD-10-CM

## 2019-06-10 LAB — CREAT BLDA-MCNC: 0.5 MG/DL (ref 0.6–1.3)

## 2019-06-10 PROCEDURE — 0 GADOBENATE DIMEGLUMINE 529 MG/ML SOLUTION: Performed by: PHYSICIAN ASSISTANT

## 2019-06-10 PROCEDURE — 82565 ASSAY OF CREATININE: CPT

## 2019-06-10 PROCEDURE — A9577 INJ MULTIHANCE: HCPCS | Performed by: PHYSICIAN ASSISTANT

## 2019-06-10 PROCEDURE — 72157 MRI CHEST SPINE W/O & W/DYE: CPT

## 2019-06-10 RX ADMIN — GADOBENATE DIMEGLUMINE 10 ML: 529 INJECTION, SOLUTION INTRAVENOUS at 10:55

## 2019-06-12 RX ORDER — LORAZEPAM 1 MG/1
TABLET ORAL
Qty: 4 TABLET | Refills: 0 | OUTPATIENT
Start: 2019-06-12 | End: 2020-08-19

## 2019-06-18 ENCOUNTER — PREP FOR SURGERY (OUTPATIENT)
Dept: OTHER | Facility: HOSPITAL | Age: 24
End: 2019-06-18

## 2019-06-18 DIAGNOSIS — G37.9 DEMYELINATING DISEASE OF CENTRAL NERVOUS SYSTEM, UNSPECIFIED (HCC): Primary | ICD-10-CM

## 2019-09-04 ENCOUNTER — TELEPHONE (OUTPATIENT)
Dept: OBGYN | Age: 24
End: 2019-09-04

## 2019-09-04 NOTE — TELEPHONE ENCOUNTER
Pt has new ob appt karina'd for 9/6. She states she has IUD in that was placed in 6/2018. Having occasional abd cramping, not serve pain. No bleeding. No constipation Pt will monitor and call if it worsens.

## 2019-09-06 ENCOUNTER — HOSPITAL ENCOUNTER (OUTPATIENT)
Dept: ULTRASOUND IMAGING | Age: 24
Discharge: HOME | End: 2019-09-06
Payer: COMMERCIAL

## 2019-09-06 ENCOUNTER — TELEPHONE (OUTPATIENT)
Dept: OBGYN | Age: 24
End: 2019-09-06

## 2019-09-06 ENCOUNTER — INITIAL PRENATAL (OUTPATIENT)
Dept: OBGYN | Age: 24
End: 2019-09-06

## 2019-09-06 VITALS
DIASTOLIC BLOOD PRESSURE: 74 MMHG | HEART RATE: 126 BPM | WEIGHT: 105 LBS | SYSTOLIC BLOOD PRESSURE: 120 MMHG | BODY MASS INDEX: 21.21 KG/M2

## 2019-09-06 DIAGNOSIS — O26.30 PREGNANCY WITH IUD IN PLACE, ANTEPARTUM: Primary | ICD-10-CM

## 2019-09-06 DIAGNOSIS — Z98.891 HISTORY OF VBAC: ICD-10-CM

## 2019-09-06 DIAGNOSIS — Q79.60 EHLERS-DANLOS SYNDROME: ICD-10-CM

## 2019-09-06 DIAGNOSIS — O26.30 PREGNANCY WITH IUD IN PLACE, ANTEPARTUM: ICD-10-CM

## 2019-09-06 DIAGNOSIS — Z3A.00 WEEKS OF GESTATION OF PREGNANCY NOT SPECIFIED: ICD-10-CM

## 2019-09-06 DIAGNOSIS — O21.9 NAUSEA AND VOMITING DURING PREGNANCY: ICD-10-CM

## 2019-09-06 LAB
ABO/RH: NORMAL
ANTIBODY SCREEN: NORMAL

## 2019-09-06 PROCEDURE — 76817 TRANSVAGINAL US OBSTETRIC: CPT

## 2019-09-06 RX ORDER — RANITIDINE 150 MG/1
150 TABLET ORAL DAILY
COMMUNITY
End: 2020-04-08 | Stop reason: SINTOL

## 2019-09-06 RX ORDER — DOXYLAMINE SUCCINATE AND PYRIDOXINE HYDROCHLORIDE, DELAYED RELEASE TABLETS 10 MG/10 MG 10; 10 MG/1; MG/1
2 TABLET, DELAYED RELEASE ORAL NIGHTLY
Qty: 60 TABLET | Refills: 1 | Status: SHIPPED | OUTPATIENT
Start: 2019-09-06 | End: 2019-09-06

## 2019-09-06 RX ORDER — DOXYLAMINE SUCCINATE AND PYRIDOXINE HYDROCHLORIDE, DELAYED RELEASE TABLETS 10 MG/10 MG 10; 10 MG/1; MG/1
TABLET, DELAYED RELEASE ORAL
Qty: 60 TABLET | Refills: 1 | OUTPATIENT
Start: 2019-09-06 | End: 2019-09-06

## 2019-09-06 RX ORDER — DOXYLAMINE SUCCINATE AND PYRIDOXINE HYDROCHLORIDE, DELAYED RELEASE TABLETS 10 MG/10 MG 10; 10 MG/1; MG/1
TABLET, DELAYED RELEASE ORAL
Qty: 100 TABLET | Refills: 1 | Status: ON HOLD | OUTPATIENT
Start: 2019-09-06 | End: 2020-04-24 | Stop reason: HOSPADM

## 2019-09-06 NOTE — PROGRESS NOTES
Diagnosis Orders   1. Pregnancy with IUD in place, antepartum  US OB Transvaginal   2. Andreas-Danlos syndrome     3. Nausea and vomiting during pregnancy     4. Weeks of gestation of pregnancy not specified         PLAN:  1. TVUS today- no IUD in place  2. Viability US in 2 weeks  MEDICATIONS:  Orders Placed This Encounter   Medications    doxylamine-pyridoxine (DICLEGIS) 10-10 MG TBEC     Sig: Take 2 tablets by mouth nightly     Dispense:  60 tablet     Refill:  1       ORDERS:  Orders Placed This Encounter   Procedures    US OB Transvaginal       Plan:    ICD-10-CM    1. Pregnancy with IUD in place, antepartum O26.30 US OB Transvaginal   2. Andreas-Danlos syndrome Q79.6    3. Nausea and vomiting during pregnancy O21.9    4.  Weeks of gestation of pregnancy not specified Z3A.00

## 2019-09-06 NOTE — PATIENT INSTRUCTIONS
Patient Education        Weeks 6 to 10 of Your Pregnancy: Care Instructions  Your Care Instructions    Congratulations on your pregnancy. This is an exciting and important time for you. During the first 6 to 10 weeks of your pregnancy, your body goes through many changes. Your baby grows very fast, even though you cannot feel it yet. You may start to notice that you feel different, both in your body and your emotions. Because each woman's pregnancy is unique, there is no right way to feel. You may feel the healthiest you have ever been, or you may feel tired or sick to your stomach (\"morning sickness\"). These early weeks are a time to make healthy choices and to eat the best foods for you and your baby. This care sheet will give you some ideas. This is also a good time to think about birth defects testing. These are tests done during pregnancy to look for possible problems with the baby. First trimester tests for birth defects can be done between 8 and 17 weeks of pregnancy, depending on the test. Talk with your doctor about what kinds of tests are available. Follow-up care is a key part of your treatment and safety. Be sure to make and go to all appointments, and call your doctor if you are having problems. It's also a good idea to know your test results and keep a list of the medicines you take. How can you care for yourself at home? Eat well  · Eat at least 3 meals and 2 healthy snacks every day. Eat fresh, whole foods, including:  ? 7 or more servings of bread, tortillas, cereal, rice, pasta, or oatmeal.  ? 3 or more servings of vegetables, especially leafy green vegetables. ? 2 or more servings of fruits. ? 3 or more servings of milk, yogurt, or cheese. ? 2 or more servings of meat, turkey, chicken, fish, eggs, or dried beans. · Drink plenty of fluids, especially water. Avoid sodas and other sweetened drinks.   · Choose foods that have important vitamins for your baby, such as calcium, iron, and folate. ? Dairy products, tofu, canned fish with bones, almonds, broccoli, dark leafy greens, corn tortillas, and fortified orange juice are good sources of calcium. ? Beef, poultry, liver, spinach, lentils, dried beans, fortified cereals, and dried fruits are rich in iron. ? Dark leafy greens, broccoli, asparagus, liver, fortified cereals, orange juice, peanuts, and almonds are good sources of folate. · Avoid foods that could harm your baby. ? Do not eat raw or undercooked meat, chicken, or fish (such as sushi or raw oysters). ? Do not eat raw eggs or foods that contain raw eggs, such as Caesar dressing. ? Do not eat soft cheeses and unpasteurized dairy foods, such as Brie, feta, or blue cheese. ? Do not eat fish that contains a lot of mercury, such as shark, swordfish, tilefish, or mckenna mackerel. Do not eat more than 6 ounces of tuna each week. ? Do not eat raw sprouts, especially alfalfa sprouts. ? Cut down on caffeine, such as coffee, tea, and cola. Protect yourself and your baby  · Do not touch gamal litter or cat feces. They can cause an infection that could harm your baby. · High body temperature can be harmful to your baby. So if you want to use a sauna or hot tub, be sure to talk to your doctor about how to use it safely. [place] with morning sickness  · Sip small amounts of water, juices, or shakes. Try drinking between meals, not with meals. · Eat 5 or 6 small meals a day. Try dry toast or crackers when you first get up, and eat breakfast a little later. · Avoid spicy, greasy, and fatty foods. · When you feel sick, open your windows or go for a short walk to get fresh air. · Try nausea wristbands. These help some women. · Tell your doctor if you think your prenatal vitamins make you sick. Where can you learn more? Go to https://vikram.healthCoupmon. org and sign in to your [employer] account.  Enter G112 in the Keepy box to learn more about \"Weeks 6 to 10 of Your Pregnancy: Care Instructions. \"     If you do not have an account, please click on the \"Sign Up Now\" link. Current as of: September 5, 2018  Content Version: 12.1  © 9515-9077 Healthwise, Incorporated. Care instructions adapted under license by [facility] ([facility]). If you have questions about a medical condition or this instruction, always ask your healthcare professional. [address] any warranty or liability for your use of this information.

## 2019-09-08 PROBLEM — Z98.891 HISTORY OF VBAC: Status: ACTIVE | Noted: 2019-09-08

## 2019-09-08 LAB — URINE CULTURE, ROUTINE: NORMAL

## 2019-09-08 ASSESSMENT — ENCOUNTER SYMPTOMS
EYES NEGATIVE: 1
NAUSEA: 1
ALLERGIC/IMMUNOLOGIC NEGATIVE: 1
VOMITING: 1
RESPIRATORY NEGATIVE: 1

## 2019-09-09 LAB
BASOPHILS ABSOLUTE: 0.1 K/UL (ref 0–0.2)
BASOPHILS RELATIVE PERCENT: 0.6 % (ref 0–1)
EOSINOPHILS ABSOLUTE: 0.1 K/UL (ref 0–0.6)
EOSINOPHILS RELATIVE PERCENT: 0.5 % (ref 0–5)
HCT VFR BLD CALC: 39.9 % (ref 37–47)
HEMOGLOBIN: 13.8 G/DL (ref 12–16)
HEPATITIS B SURFACE ANTIGEN INTERPRETATION: ABNORMAL
HIV 1,2 COMBO ANTIGEN/ANTIBODY: NEGATIVE
IMMATURE GRANULOCYTES #: 0 K/UL
LYMPHOCYTES ABSOLUTE: 2.8 K/UL (ref 1.1–4.5)
LYMPHOCYTES RELATIVE PERCENT: 29.7 % (ref 20–40)
MCH RBC QN AUTO: 32 PG (ref 27–31)
MCHC RBC AUTO-ENTMCNC: 34.6 G/DL (ref 33–37)
MCV RBC AUTO: 92.6 FL (ref 81–99)
MONOCYTES ABSOLUTE: 0.6 K/UL (ref 0–0.9)
MONOCYTES RELATIVE PERCENT: 6 % (ref 0–10)
NEUTROPHILS ABSOLUTE: 5.9 K/UL (ref 1.5–7.5)
NEUTROPHILS RELATIVE PERCENT: 62.9 % (ref 50–65)
PDW BLD-RTO: 11.3 % (ref 11.5–14.5)
PLATELET # BLD: 275 K/UL (ref 130–400)
PMV BLD AUTO: 10.4 FL (ref 9.4–12.3)
RBC # BLD: 4.31 M/UL (ref 4.2–5.4)
RPR: ABNORMAL
RUBELLA ANTIBODY IGG: REACTIVE
WBC # BLD: 9.4 K/UL (ref 4.8–10.8)

## 2019-09-10 LAB
APTIMA MEDIA TYPE: NORMAL
CHLAMYDIA TRACHOMATIS AMPLIFIED DET: NEGATIVE
N GONORRHOEAE AMPLIFIED DET: NEGATIVE
SPECIMEN SOURCE: NORMAL

## 2019-09-11 LAB — VZV IGG SER QL IA: 2.96

## 2019-09-23 ENCOUNTER — ROUTINE PRENATAL (OUTPATIENT)
Dept: OBGYN | Age: 24
End: 2019-09-23

## 2019-09-23 ENCOUNTER — HOSPITAL ENCOUNTER (OUTPATIENT)
Dept: ULTRASOUND IMAGING | Age: 24
Discharge: HOME | End: 2019-09-23
Payer: COMMERCIAL

## 2019-09-23 ENCOUNTER — HOSPITAL ENCOUNTER (OUTPATIENT)
Age: 24
Discharge: HOME | End: 2019-09-23
Attending: OBSTETRICS & GYNECOLOGY | Admitting: OBSTETRICS & GYNECOLOGY
Payer: COMMERCIAL

## 2019-09-23 VITALS
SYSTOLIC BLOOD PRESSURE: 105 MMHG | BODY MASS INDEX: 21.61 KG/M2 | HEART RATE: 112 BPM | WEIGHT: 107 LBS | DIASTOLIC BLOOD PRESSURE: 72 MMHG

## 2019-09-23 VITALS — HEIGHT: 60 IN | BODY MASS INDEX: 21.2 KG/M2 | TEMPERATURE: 99.8 F | WEIGHT: 108 LBS | RESPIRATION RATE: 18 BRPM

## 2019-09-23 DIAGNOSIS — E86.0 DEHYDRATION: ICD-10-CM

## 2019-09-23 DIAGNOSIS — Q79.60 EHLERS-DANLOS SYNDROME: ICD-10-CM

## 2019-09-23 DIAGNOSIS — O21.9 NAUSEA AND VOMITING DURING PREGNANCY: ICD-10-CM

## 2019-09-23 DIAGNOSIS — Z12.4 SCREENING FOR CERVICAL CANCER: ICD-10-CM

## 2019-09-23 DIAGNOSIS — Z3A.08 8 WEEKS GESTATION OF PREGNANCY: Primary | ICD-10-CM

## 2019-09-23 DIAGNOSIS — O36.8390 MATERNAL CARE FOR FETAL TACHYCARDIA DURING PREGNANCY: ICD-10-CM

## 2019-09-23 DIAGNOSIS — Z11.51 SCREENING FOR HPV (HUMAN PAPILLOMAVIRUS): ICD-10-CM

## 2019-09-23 DIAGNOSIS — K31.84 GASTROPARESIS: ICD-10-CM

## 2019-09-23 DIAGNOSIS — Z98.891 HISTORY OF VBAC: ICD-10-CM

## 2019-09-23 DIAGNOSIS — O26.30 PREGNANCY WITH IUD IN PLACE, ANTEPARTUM: ICD-10-CM

## 2019-09-23 DIAGNOSIS — Z3A.00 WEEKS OF GESTATION OF PREGNANCY NOT SPECIFIED: ICD-10-CM

## 2019-09-23 LAB
BILIRUBIN URINE: NEGATIVE
BLOOD, URINE: NEGATIVE
CLARITY: CLEAR
COLOR: YELLOW
GLUCOSE URINE: NEGATIVE MG/DL
KETONES, URINE: ABNORMAL MG/DL
LEUKOCYTE ESTERASE, URINE: NEGATIVE
NITRITE, URINE: NEGATIVE
PH UA: 6 (ref 5–8)
PROTEIN UA: NEGATIVE MG/DL
SPECIFIC GRAVITY UA: 1.03 (ref 1–1.03)
UROBILINOGEN, URINE: 0.2 E.U./DL

## 2019-09-23 PROCEDURE — 76817 TRANSVAGINAL US OBSTETRIC: CPT

## 2019-09-23 PROCEDURE — 99211 OFF/OP EST MAY X REQ PHY/QHP: CPT

## 2019-09-23 PROCEDURE — 81003 URINALYSIS AUTO W/O SCOPE: CPT

## 2019-09-23 PROCEDURE — 0502F SUBSEQUENT PRENATAL CARE: CPT | Performed by: ADVANCED PRACTICE MIDWIFE

## 2019-09-23 PROCEDURE — 2580000003 HC RX 258: Performed by: ADVANCED PRACTICE MIDWIFE

## 2019-09-23 PROCEDURE — 6370000000 HC RX 637 (ALT 250 FOR IP): Performed by: ADVANCED PRACTICE MIDWIFE

## 2019-09-23 RX ORDER — SODIUM CHLORIDE 0.9 % (FLUSH) 0.9 %
10 SYRINGE (ML) INJECTION EVERY 12 HOURS SCHEDULED
Status: DISCONTINUED | OUTPATIENT
Start: 2019-09-23 | End: 2019-09-23 | Stop reason: HOSPADM

## 2019-09-23 RX ORDER — ACETAMINOPHEN 325 MG/1
650 TABLET ORAL EVERY 4 HOURS PRN
Status: DISCONTINUED | OUTPATIENT
Start: 2019-09-23 | End: 2019-09-23 | Stop reason: HOSPADM

## 2019-09-23 RX ORDER — DEXTROSE, SODIUM CHLORIDE, SODIUM LACTATE, POTASSIUM CHLORIDE, AND CALCIUM CHLORIDE 5; .6; .31; .03; .02 G/100ML; G/100ML; G/100ML; G/100ML; G/100ML
INJECTION, SOLUTION INTRAVENOUS CONTINUOUS
Status: DISCONTINUED | OUTPATIENT
Start: 2019-09-23 | End: 2019-09-23 | Stop reason: HOSPADM

## 2019-09-23 RX ORDER — SODIUM CHLORIDE 0.9 % (FLUSH) 0.9 %
10 SYRINGE (ML) INJECTION PRN
Status: DISCONTINUED | OUTPATIENT
Start: 2019-09-23 | End: 2019-09-23 | Stop reason: HOSPADM

## 2019-09-23 RX ORDER — ONDANSETRON 2 MG/ML
4 INJECTION INTRAMUSCULAR; INTRAVENOUS EVERY 6 HOURS PRN
Status: DISCONTINUED | OUTPATIENT
Start: 2019-09-23 | End: 2019-09-23 | Stop reason: HOSPADM

## 2019-09-23 RX ADMIN — ACETAMINOPHEN 650 MG: 325 TABLET ORAL at 11:03

## 2019-09-23 RX ADMIN — SODIUM CHLORIDE, SODIUM LACTATE, POTASSIUM CHLORIDE, CALCIUM CHLORIDE AND DEXTROSE MONOHYDRATE: 5; 600; 310; 30; 20 INJECTION, SOLUTION INTRAVENOUS at 10:49

## 2019-09-23 ASSESSMENT — PAIN SCALES - GENERAL: PAINLEVEL_OUTOF10: 1

## 2019-09-23 NOTE — PROGRESS NOTES
CNM Prenatal Office Note  Subjective:  Dorothy Hawthorne is here for initial obstetrical visit. She has had confirmation of pregnancy. Her history has been reviewed and OB complications identified. Today she is 8w1d weeks EGA. She is doing well and has no complaints. She  has N&V. The nausea is constant due & she is vomiting approximately 5 times a day. She is taking all of her antiemetics. It is exacerbated due to having a Hx of Gastroparesis. She denies vaginal bleeding, or other common first trimester complaints. Objective: Mother's Prenatal Vitals  BP: 105/72  Weight: 107 lb (48.5 kg)  Pulse: 112  Patient Position: Sitting  Prenatal Fetal Information  Fetal Heart Rate:   Movement: Absent  Pt is A&Ox3, in no acute distress. Normocephalic, atraumatic. PERRL. Resp even and non-labored. Skin pink, warm & dry. Gravid abdomen. DAVIS's well. Gait steady. Assessment:    IUP at 8w1d wks  Unable to void- will send to labor & delivery for D5 LR bolus      Diagnosis Orders   1. 8 weeks gestation of pregnancy     2. Screening for cervical cancer     3. Screening for HPV (human papillomavirus)  Will wait for next visit due to just having a TVUS   4. Andreas-Danlos syndrome     5. Nausea and vomiting during pregnancy     6. History of      7. Maternal care for fetal tachycardia during pregnancy  She states that at times her HR will reach 150 & the closer to term that she gets, it may increase to 170s. She will have dizziness occasioanlly with it- She is under the care of a cardiologist      Plan:   Pt counseled on counseled on balanced nutrition, adequate fluid intake, taking PNV daily, and exercise.  To labor & delivery for hydration    Continue with routine prenatal care.  [place] [facility] for prenatal visit, u/s, physical exam, and results  MEDICATIONS:  No orders of the defined types were placed in this encounter.     ORDERS:  Orders Placed This Encounter

## 2019-09-23 NOTE — PATIENT INSTRUCTIONS
Pregnancy: Care Instructions. \"     If you do not have an account, please click on the \"Sign Up Now\" link. Current as of: September 5, 2018  Content Version: 12.1  © 1618-3386 Healthwise, Incorporated. Care instructions adapted under license by [facility] ([facility]). If you have questions about a medical condition or this instruction, always ask your healthcare professional. [address] any warranty or liability for your use of this information.

## 2019-09-23 NOTE — PROGRESS NOTES
Pt denies any vaginal leaking bleeding or contractions. Pt had repeat ob ultrasound today. Pt c/o nausea and vomiting, pt taking Diclegis bid with some results.

## 2019-09-30 ENCOUNTER — TELEPHONE (OUTPATIENT)
Dept: OBGYN | Age: 24
End: 2019-09-30

## 2019-09-30 NOTE — TELEPHONE ENCOUNTER
Referral to [facility] ALBERTA Barber made 10/3/2019 @ 11:00. All records faxed.

## 2019-10-01 ENCOUNTER — ROUTINE PRENATAL (OUTPATIENT)
Dept: OBGYN | Age: 24
End: 2019-10-01

## 2019-10-01 VITALS
WEIGHT: 106 LBS | HEART RATE: 104 BPM | SYSTOLIC BLOOD PRESSURE: 132 MMHG | BODY MASS INDEX: 21.05 KG/M2 | DIASTOLIC BLOOD PRESSURE: 71 MMHG

## 2019-10-01 DIAGNOSIS — Z12.4 SCREENING FOR CERVICAL CANCER: ICD-10-CM

## 2019-10-01 DIAGNOSIS — Q79.60 EHLERS-DANLOS SYNDROME: ICD-10-CM

## 2019-10-01 DIAGNOSIS — Z98.891 HISTORY OF VBAC: ICD-10-CM

## 2019-10-01 DIAGNOSIS — O21.9 NAUSEA AND VOMITING DURING PREGNANCY: ICD-10-CM

## 2019-10-01 DIAGNOSIS — N89.8 VAGINAL ODOR: ICD-10-CM

## 2019-10-01 DIAGNOSIS — O26.30 PREGNANCY WITH IUD IN PLACE, ANTEPARTUM: Primary | ICD-10-CM

## 2019-10-01 PROCEDURE — 0502F SUBSEQUENT PRENATAL CARE: CPT | Performed by: NURSE PRACTITIONER

## 2019-10-04 RX ORDER — CLINDAMYCIN PHOSPHATE 20 MG/G
CREAM VAGINAL
Qty: 2 G | Refills: 0 | Status: SHIPPED | OUTPATIENT
Start: 2019-10-04 | End: 2019-10-11

## 2019-10-11 ENCOUNTER — TELEPHONE (OUTPATIENT)
Dept: OBGYN | Age: 24
End: 2019-10-11

## 2019-10-21 ENCOUNTER — ROUTINE PRENATAL (OUTPATIENT)
Dept: OBGYN | Age: 24
End: 2019-10-21

## 2019-10-21 VITALS
BODY MASS INDEX: 21.25 KG/M2 | HEART RATE: 128 BPM | WEIGHT: 107 LBS | DIASTOLIC BLOOD PRESSURE: 68 MMHG | SYSTOLIC BLOOD PRESSURE: 122 MMHG

## 2019-10-21 DIAGNOSIS — O09.91 HIGH-RISK PREGNANCY IN FIRST TRIMESTER: Primary | ICD-10-CM

## 2019-10-21 DIAGNOSIS — Z3A.11 11 WEEKS GESTATION OF PREGNANCY: ICD-10-CM

## 2019-10-21 DIAGNOSIS — Q79.60 EHLERS-DANLOS SYNDROME: ICD-10-CM

## 2019-10-21 PROCEDURE — 0502F SUBSEQUENT PRENATAL CARE: CPT | Performed by: NURSE PRACTITIONER

## 2019-10-23 PROBLEM — E86.0 DEHYDRATION: Status: RESOLVED | Noted: 2019-09-23 | Resolved: 2019-10-23

## 2019-11-11 ENCOUNTER — ROUTINE PRENATAL (OUTPATIENT)
Dept: OBGYN | Age: 24
End: 2019-11-11

## 2019-11-11 VITALS
SYSTOLIC BLOOD PRESSURE: 115 MMHG | HEART RATE: 119 BPM | WEIGHT: 113 LBS | BODY MASS INDEX: 22.44 KG/M2 | DIASTOLIC BLOOD PRESSURE: 76 MMHG

## 2019-11-11 DIAGNOSIS — Q79.60 EHLERS-DANLOS SYNDROME: ICD-10-CM

## 2019-11-11 DIAGNOSIS — K31.84 GASTROPARESIS: ICD-10-CM

## 2019-11-11 DIAGNOSIS — G90.A POSTURAL ORTHOSTATIC TACHYCARDIA SYNDROME: ICD-10-CM

## 2019-11-11 DIAGNOSIS — Z3A.14 14 WEEKS GESTATION OF PREGNANCY: ICD-10-CM

## 2019-11-11 DIAGNOSIS — Z98.891 HISTORY OF VBAC: ICD-10-CM

## 2019-11-11 DIAGNOSIS — O21.9 NAUSEA AND VOMITING DURING PREGNANCY: ICD-10-CM

## 2019-11-11 DIAGNOSIS — K21.9 GASTROESOPHAGEAL REFLUX DISEASE, ESOPHAGITIS PRESENCE NOT SPECIFIED: ICD-10-CM

## 2019-11-11 DIAGNOSIS — Z34.82 ENCOUNTER FOR SUPERVISION OF OTHER NORMAL PREGNANCY IN SECOND TRIMESTER: Primary | ICD-10-CM

## 2019-11-11 PROCEDURE — 0502F SUBSEQUENT PRENATAL CARE: CPT | Performed by: ADVANCED PRACTICE MIDWIFE

## 2019-11-11 RX ORDER — FLUDROCORTISONE ACETATE 0.1 MG/1
0.1 TABLET ORAL 2 TIMES DAILY
Qty: 60 TABLET | Refills: 3 | Status: SHIPPED | OUTPATIENT
Start: 2019-11-11 | End: 2019-12-11

## 2019-12-09 ENCOUNTER — ROUTINE PRENATAL (OUTPATIENT)
Dept: OBGYN | Age: 24
End: 2019-12-09

## 2019-12-09 ENCOUNTER — HOSPITAL ENCOUNTER (OUTPATIENT)
Age: 24
Discharge: HOME | End: 2019-12-09
Attending: OBSTETRICS & GYNECOLOGY | Admitting: OBSTETRICS & GYNECOLOGY
Payer: COMMERCIAL

## 2019-12-09 VITALS
WEIGHT: 115 LBS | SYSTOLIC BLOOD PRESSURE: 126 MMHG | HEIGHT: 60 IN | BODY MASS INDEX: 22.58 KG/M2 | DIASTOLIC BLOOD PRESSURE: 74 MMHG

## 2019-12-09 VITALS
SYSTOLIC BLOOD PRESSURE: 126 MMHG | WEIGHT: 115 LBS | DIASTOLIC BLOOD PRESSURE: 74 MMHG | HEART RATE: 135 BPM | BODY MASS INDEX: 22.84 KG/M2

## 2019-12-09 DIAGNOSIS — Z98.891 HISTORY OF VBAC: ICD-10-CM

## 2019-12-09 DIAGNOSIS — K31.84 GASTROPARESIS: ICD-10-CM

## 2019-12-09 DIAGNOSIS — O21.9 NAUSEA AND VOMITING DURING PREGNANCY: ICD-10-CM

## 2019-12-09 DIAGNOSIS — Q79.60 EHLERS-DANLOS SYNDROME: ICD-10-CM

## 2019-12-09 DIAGNOSIS — K21.9 GASTROESOPHAGEAL REFLUX DISEASE, ESOPHAGITIS PRESENCE NOT SPECIFIED: ICD-10-CM

## 2019-12-09 DIAGNOSIS — G90.A POTS (POSTURAL ORTHOSTATIC TACHYCARDIA SYNDROME): ICD-10-CM

## 2019-12-09 DIAGNOSIS — Z3A.14 14 WEEKS GESTATION OF PREGNANCY: ICD-10-CM

## 2019-12-09 DIAGNOSIS — Z3A.18 18 WEEKS GESTATION OF PREGNANCY: Primary | ICD-10-CM

## 2019-12-09 DIAGNOSIS — O09.91 HIGH-RISK PREGNANCY IN FIRST TRIMESTER: ICD-10-CM

## 2019-12-09 DIAGNOSIS — G90.A POSTURAL ORTHOSTATIC TACHYCARDIA SYNDROME: ICD-10-CM

## 2019-12-09 DIAGNOSIS — R42 DIZZINESS: ICD-10-CM

## 2019-12-09 LAB
ALBUMIN SERPL-MCNC: 3.9 G/DL (ref 3.5–5.2)
ALP BLD-CCNC: 64 U/L (ref 35–104)
ALT SERPL-CCNC: 8 U/L (ref 5–33)
ANION GAP SERPL CALCULATED.3IONS-SCNC: 14 MMOL/L (ref 7–19)
AST SERPL-CCNC: 13 U/L (ref 5–32)
BILIRUB SERPL-MCNC: 0.3 MG/DL (ref 0.2–1.2)
BUN BLDV-MCNC: 5 MG/DL (ref 6–20)
CALCIUM SERPL-MCNC: 8.5 MG/DL (ref 8.6–10)
CHLORIDE BLD-SCNC: 104 MMOL/L (ref 98–111)
CO2: 20 MMOL/L (ref 22–29)
CREAT SERPL-MCNC: <0.5 MG/DL (ref 0.5–0.9)
FERRITIN: 40.6 NG/ML (ref 13–150)
GFR NON-AFRICAN AMERICAN: >60
GLUCOSE BLD-MCNC: 79 MG/DL (ref 74–109)
HCT VFR BLD CALC: 36.4 % (ref 37–47)
HEMOGLOBIN: 12.2 G/DL (ref 12–16)
MCH RBC QN AUTO: 31.9 PG (ref 27–31)
MCHC RBC AUTO-ENTMCNC: 33.5 G/DL (ref 33–37)
MCV RBC AUTO: 95.3 FL (ref 81–99)
PDW BLD-RTO: 12.4 % (ref 11.5–14.5)
PLATELET # BLD: 182 K/UL (ref 130–400)
PMV BLD AUTO: 10.8 FL (ref 9.4–12.3)
POTASSIUM SERPL-SCNC: 3.7 MMOL/L (ref 3.5–5)
RBC # BLD: 3.82 M/UL (ref 4.2–5.4)
SODIUM BLD-SCNC: 138 MMOL/L (ref 136–145)
TOTAL PROTEIN: 6.5 G/DL (ref 6.6–8.7)
VITAMIN B-12: 395 PG/ML (ref 211–946)
VITAMIN D 25-HYDROXY: 31.6 NG/ML
WBC # BLD: 9.8 K/UL (ref 4.8–10.8)

## 2019-12-09 PROCEDURE — 0502F SUBSEQUENT PRENATAL CARE: CPT | Performed by: ADVANCED PRACTICE MIDWIFE

## 2019-12-09 PROCEDURE — 2580000003 HC RX 258: Performed by: ADVANCED PRACTICE MIDWIFE

## 2019-12-09 RX ORDER — SODIUM CHLORIDE 0.9 % (FLUSH) 0.9 %
10 SYRINGE (ML) INJECTION PRN
Status: DISCONTINUED | OUTPATIENT
Start: 2019-12-09 | End: 2019-12-09 | Stop reason: HOSPADM

## 2019-12-09 RX ORDER — SODIUM CHLORIDE 0.9 % (FLUSH) 0.9 %
10 SYRINGE (ML) INJECTION EVERY 12 HOURS SCHEDULED
Status: DISCONTINUED | OUTPATIENT
Start: 2019-12-09 | End: 2019-12-09 | Stop reason: HOSPADM

## 2019-12-09 RX ORDER — DOXYLAMINE SUCCINATE AND PYRIDOXINE HYDROCHLORIDE, DELAYED RELEASE TABLETS 10 MG/10 MG 10; 10 MG/1; MG/1
1 TABLET, DELAYED RELEASE ORAL 2 TIMES DAILY
Qty: 60 TABLET | Refills: 3 | Status: ON HOLD | OUTPATIENT
Start: 2019-12-09 | End: 2020-04-24 | Stop reason: HOSPADM

## 2019-12-09 RX ORDER — SODIUM CHLORIDE, SODIUM LACTATE, POTASSIUM CHLORIDE, AND CALCIUM CHLORIDE .6; .31; .03; .02 G/100ML; G/100ML; G/100ML; G/100ML
1000 INJECTION, SOLUTION INTRAVENOUS ONCE
Status: COMPLETED | OUTPATIENT
Start: 2019-12-09 | End: 2019-12-09

## 2019-12-09 RX ORDER — ONDANSETRON 2 MG/ML
4 INJECTION INTRAMUSCULAR; INTRAVENOUS EVERY 6 HOURS PRN
Status: DISCONTINUED | OUTPATIENT
Start: 2019-12-09 | End: 2019-12-09 | Stop reason: HOSPADM

## 2019-12-09 RX ADMIN — SODIUM CHLORIDE, SODIUM LACTATE, POTASSIUM CHLORIDE, AND CALCIUM CHLORIDE 1000 ML: 600; 310; 30; 20 INJECTION, SOLUTION INTRAVENOUS at 11:40

## 2020-01-06 ENCOUNTER — ROUTINE PRENATAL (OUTPATIENT)
Dept: OBGYN | Age: 25
End: 2020-01-06

## 2020-01-06 VITALS
HEART RATE: 121 BPM | DIASTOLIC BLOOD PRESSURE: 69 MMHG | BODY MASS INDEX: 24.43 KG/M2 | SYSTOLIC BLOOD PRESSURE: 110 MMHG | WEIGHT: 123 LBS

## 2020-01-06 PROBLEM — O21.9 NAUSEA AND VOMITING DURING PREGNANCY: Status: ACTIVE | Noted: 2020-01-06

## 2020-01-06 PROCEDURE — 0502F SUBSEQUENT PRENATAL CARE: CPT | Performed by: ADVANCED PRACTICE MIDWIFE

## 2020-01-06 RX ORDER — MULTIVIT-MIN/IRON/FOLIC ACID/K 18-600-40
CAPSULE ORAL
COMMUNITY
End: 2020-11-13 | Stop reason: ALTCHOICE

## 2020-01-06 RX ORDER — PRENATAL WITH FERROUS FUM AND FOLIC ACID 3080; 920; 120; 400; 22; 1.84; 3; 20; 10; 1; 12; 200; 27; 25; 2 [IU]/1; [IU]/1; MG/1; [IU]/1; MG/1; MG/1; MG/1; MG/1; MG/1; MG/1; UG/1; MG/1; MG/1; MG/1; MG/1
1 TABLET ORAL DAILY
COMMUNITY
End: 2020-11-13 | Stop reason: ALTCHOICE

## 2020-01-06 RX ORDER — MULTIVIT WITH MINERALS/LUTEIN
250 TABLET ORAL DAILY
COMMUNITY
End: 2020-11-13 | Stop reason: ALTCHOICE

## 2020-01-06 NOTE — PATIENT INSTRUCTIONS
more sessions each day. Ease or reduce swelling in your feet, ankles, hands, and fingers  · If your fingers are puffy, take off your rings. · Do not eat high-salt foods, such as potato chips. · Prop up your feet on a stool or couch as much as possible. Sleep with pillows under your feet. · Do not stand for long periods of time or wear tight shoes. · Wear support stockings. Where can you learn more? Go to https://Safe Technologies International.Social Shopping Network Â®. org and sign in to your Sharalike account. Enter G264 in the Zesty box to learn more about \"Weeks 22 to 26 of Your Pregnancy: Care Instructions. \"     If you do not have an account, please click on the \"Sign Up Now\" link. Current as of: September 5, 2018  Content Version: 12.1  © 9144-0811 Healthwise, Incorporated. Care instructions adapted under license by [facility] ([facility]). If you have questions about a medical condition or this instruction, always ask your healthcare professional. [place] [address] any warranty or liability for your use of this information.

## 2020-01-06 NOTE — PROGRESS NOTES
CNM Prenatal Office Note  Subjective:  Jackie Aguilera is here for a return obstetrical visit. Today she is 22w2d weeks EGA. She is doing well, taking her PNV as directed, and has no complaints. She  does not have vaginal bleeding, n/v, syncope, or headaches. Pt does feel fetal movement regularly. Objective: Mother's Prenatal Vitals  BP: 110/69  Weight: 123 lb (55.8 kg)  Pulse: 121  Patient Position: Sitting  Prenatal Fetal Information  Fetal Heart Rate: [id number]-173  Movement: Present  Pt is A&Ox3, in no acute distress. Normocephalic, atraumatic. PERRL. Resp even and non-labored. Skin pink, warm & dry. Gravid abdomen. DAVIS's well. Gait steady. Assessment:    IUP at 22w2d wks      Diagnosis Orders   1. High-risk pregnancy in second trimester     2. Andreas-Danlos syndrome     3. Postural orthostatic tachycardia syndrome     4. Gastroparesis     5. History of      6. 22 weeks gestation of pregnancy     7. Nausea and vomiting during pregnancy       Plan:   Pt counseled on counseled on balanced nutrition, adequate fluid intake, taking PNV daily, and exercise along with GHTN precautions and  labor   Continue with routine prenatal care.  RTC in 4 wks for prenatal visit   Martín Smith on 2020   Has not been able to come weekly for IV fluids due to lack of sitter      MEDICATIONS:  No orders of the defined types were placed in this encounter. ORDERS:  No orders of the defined types were placed in this encounter. More than 50% of this 20 min visit was education and counseling.

## 2020-01-06 NOTE — PROGRESS NOTES
Pt is here for routine parental care. Pt denies vaginal bleeding, cramping or leaking of fluid. Pt states + fetal movement. Pt continues Diclegis. Pt states she does have contractions through out the day. She can have up to 5-10 a day.

## 2020-02-03 ENCOUNTER — ROUTINE PRENATAL (OUTPATIENT)
Dept: OBGYN | Age: 25
End: 2020-02-03

## 2020-02-03 VITALS
DIASTOLIC BLOOD PRESSURE: 82 MMHG | BODY MASS INDEX: 26.02 KG/M2 | HEART RATE: 129 BPM | WEIGHT: 131 LBS | SYSTOLIC BLOOD PRESSURE: 130 MMHG

## 2020-02-03 PROCEDURE — G8427 DOCREV CUR MEDS BY ELIG CLIN: HCPCS | Performed by: ADVANCED PRACTICE MIDWIFE

## 2020-02-03 PROCEDURE — 0502F SUBSEQUENT PRENATAL CARE: CPT | Performed by: ADVANCED PRACTICE MIDWIFE

## 2020-02-03 PROCEDURE — G8484 FLU IMMUNIZE NO ADMIN: HCPCS | Performed by: ADVANCED PRACTICE MIDWIFE

## 2020-02-03 PROCEDURE — 1036F TOBACCO NON-USER: CPT | Performed by: ADVANCED PRACTICE MIDWIFE

## 2020-02-03 PROCEDURE — G8419 CALC BMI OUT NRM PARAM NOF/U: HCPCS | Performed by: ADVANCED PRACTICE MIDWIFE

## 2020-02-03 NOTE — PATIENT INSTRUCTIONS
Patient Education        Weeks 26 to 30 of Your Pregnancy: Care Instructions  Your Care Instructions    You are now in your last trimester of pregnancy. Your baby is growing rapidly. And you'll probably feel your baby moving around more often. Your doctor may ask you to count your baby's kicks. Your back may ache as your body gets used to your baby's size and length. If you haven't already had the Tdap shot during this pregnancy, talk to your doctor about getting it. It will help protect your  against pertussis infection. During this time, it's important to take care of yourself and pay attention to what your body needs. If you feel sexual, explore ways to be close with your partner that match your comfort and desire. Use the tips provided in this care sheet to find ways to be sexual in your own way. Follow-up care is a key part of your treatment and safety. Be sure to make and go to all appointments, and call your doctor if you are having problems. It's also a good idea to know your test results and keep a list of the medicines you take. How can you care for yourself at home? Take it easy at work  · Take frequent breaks. If possible, stop working when you are tired, and rest during your lunch hour. · Take bathroom breaks every 2 hours. · Change positions often. If you sit for long periods, stand up and walk around. · When you stand for a long time, keep one foot on a low stool with your knee bent. After standing a lot, sit with your feet up. · Avoid fumes, chemicals, and tobacco smoke. Be sexual in your own way  · Having sex during pregnancy is okay, unless your doctor tells you not to. · You may be very interested in sex, or you may have no interest at all. · Your growing belly can make it hard to find a good position during intercourse. [place] and explore. · You may get cramps in your uterus when your partner touches your breasts.   · A back rub may relieve the backache or cramps that sometimes follow orgasm. Learn about  labor  · Watch for signs of  labor. You may be going into labor if:  ? You have menstrual-like cramps, with or without nausea. ? You have about 6 or more contractions in 1 hour, even after you have had a glass of water and are resting. ? You have a low, dull backache that does not go away when you change your position. ? You have pain or pressure in your pelvis that comes and goes in a pattern. ? You have intestinal cramping or flu-like symptoms, with or without diarrhea.  ? You notice an increase or change in your vaginal discharge. Discharge may be heavy, mucus-like, watery, or streaked with blood. ? Your water breaks. · If you think you have  labor:  ? Drink 2 or 3 glasses of water or juice. Not drinking enough fluids can cause contractions. ? Stop what you are doing, and empty your bladder. Then lie down on your left side for at least 1 hour. ? While lying on your side, find your breast bone. Put your fingers in the soft spot just below it. Move your fingers down toward your belly button to find the top of your uterus. Check to see if it is tight. ? Contractions can be weak or strong. Record your contractions for an hour. Time a contraction from the start of one contraction to the start of the next one.  ? Single or several strong contractions without a pattern are called [place]-Hughes contractions. They are practice contractions but not the start of labor. They often stop if you change what you are doing. ? Call your doctor if you have regular contractions. Where can you learn more? Go to https://ChargePoint Technologyevi.Virtual Power Systems. org and sign in to your Inveshare account. Enter P922 in the [facility] box to learn more about \"Weeks 26 to 30 of Your Pregnancy: Care Instructions. \"     If you do not have an account, please click on the \"Sign Up Now\" link.   Current as of: May 29, 2019  Content Version: 12.3  © 8154-6595 Healthwise, may have gestational diabetes. A different method is done in one step. It is another version of the OGTT. You cannot eat or drink for at least 8 hours before the test. Then a blood sample is taken when you arrive for the test. This is your fasting blood glucose value. It provides a baseline for comparing other glucose values. You drink a liquid that contains 75 grams of sugar (glucose). Your blood sample is taken 1 and then 2 hours later to see how much sugar is in your blood. If you don't have a lot of sugar in your blood, you do not have gestational diabetes. If you do have a lot of sugar in your blood, you may have gestational diabetes. What else should you know about the test?  Your blood glucose level may drop very low toward the end of the glucose test. If this happens, you may feel weak, hungry, and restless. Tell your doctor if you have these symptoms. The test usually will be stopped. You may vomit after drinking the sweet liquid. If this happens, the test may need to be repeated at a later time. Your doctor may do more glucose tests at different times during your pregnancy. Follow-up care is a key part of your treatment and safety. Be sure to make and go to all appointments, and call your doctor if you are having problems. It's also a good idea to know your test results and keep a list of the medicines you take. Where can you learn more? Go to https://Keep Holdingsevi.Savant Systems. org and sign in to your INTEX Program account. Enter T792 in the FuelMiner box to learn more about \"Learning About Glucose Testing During Pregnancy. \"     If you do not have an account, please click on the \"Sign Up Now\" link. Current as of: April 16, 2019  Content Version: 12.3  © 9729-1163 Healthwise, Incorporated. Care instructions adapted under license by [facility] ([facility]).  If you have questions about a medical condition or this instruction, always ask your healthcare professional. [address]

## 2020-02-03 NOTE — PROGRESS NOTES
XR CHEST STANDARD (2 VW)   6. 26 weeks gestation of pregnancy  ECHO Complete 2D W Doppler W Color    Hemoglobin A1C    XR CHEST STANDARD (2 VW)   7. Dizziness  XR CHEST STANDARD (2 VW)     Plan:   Pt counseled on counseled on balanced nutrition, adequate fluid intake, taking PNV daily, and exercise along with Warning signs of SOB, chest pain present for emegemcy care, GHTN precautions, Kick count and  labor   Continue with routine prenatal care.   surveillance indicated for Andreas-Danlos Syndrome, POTS   States can't tolerate the 1 glucose screening due to gastroparesis- will do a Hgb A1c    Cervical length has improved, so does not needs to see MFM only every 4 weeks   RTC in 2 wks for prenatal visit    MEDICATIONS:  No orders of the defined types were placed in this encounter. ORDERS:  Orders Placed This Encounter   Procedures    FL Sniff Test W 2 VW Cxr    XR CHEST STANDARD (2 VW)    CBC Auto Differential    Hemoglobin A1C    Vitamin B12    Vitamin D 25 Hydroxy    Ferritin    Comprehensive Metabolic Panel    CBC Auto Differential    ECHO Complete 2D W Doppler W Color       More than 50% of this 20 min visit was education and counseling.

## 2020-02-04 DIAGNOSIS — Z36.9 ANTENATAL SCREENING ENCOUNTER: ICD-10-CM

## 2020-02-04 DIAGNOSIS — G90.A POSTURAL ORTHOSTATIC TACHYCARDIA SYNDROME: ICD-10-CM

## 2020-02-04 DIAGNOSIS — O09.92 HIGH-RISK PREGNANCY IN SECOND TRIMESTER: ICD-10-CM

## 2020-02-04 DIAGNOSIS — R06.02 SHORTNESS OF BREATH: ICD-10-CM

## 2020-02-04 DIAGNOSIS — Q79.60 EHLERS-DANLOS SYNDROME: ICD-10-CM

## 2020-02-04 LAB
ALBUMIN SERPL-MCNC: 4 G/DL (ref 3.5–5.2)
ALP BLD-CCNC: 88 U/L (ref 35–104)
ALT SERPL-CCNC: 8 U/L (ref 5–33)
ANION GAP SERPL CALCULATED.3IONS-SCNC: 14 MMOL/L (ref 7–19)
AST SERPL-CCNC: 13 U/L (ref 5–32)
BASOPHILS ABSOLUTE: 0 K/UL (ref 0–0.2)
BASOPHILS RELATIVE PERCENT: 0.4 % (ref 0–1)
BILIRUB SERPL-MCNC: 0.4 MG/DL (ref 0.2–1.2)
BUN BLDV-MCNC: 5 MG/DL (ref 6–20)
CALCIUM SERPL-MCNC: 8.5 MG/DL (ref 8.6–10)
CHLORIDE BLD-SCNC: 103 MMOL/L (ref 98–111)
CO2: 20 MMOL/L (ref 22–29)
CREAT SERPL-MCNC: <0.5 MG/DL (ref 0.5–0.9)
EOSINOPHILS ABSOLUTE: 0.1 K/UL (ref 0–0.6)
EOSINOPHILS RELATIVE PERCENT: 0.5 % (ref 0–5)
FERRITIN: 11.4 NG/ML (ref 13–150)
GFR NON-AFRICAN AMERICAN: >60
GLUCOSE BLD-MCNC: 82 MG/DL (ref 74–109)
HCT VFR BLD CALC: 35.6 % (ref 37–47)
HEMOGLOBIN: 12.1 G/DL (ref 12–16)
IMMATURE GRANULOCYTES #: 0.1 K/UL
LYMPHOCYTES ABSOLUTE: 1.8 K/UL (ref 1.1–4.5)
LYMPHOCYTES RELATIVE PERCENT: 17.2 % (ref 20–40)
MCH RBC QN AUTO: 32.2 PG (ref 27–31)
MCHC RBC AUTO-ENTMCNC: 34 G/DL (ref 33–37)
MCV RBC AUTO: 94.7 FL (ref 81–99)
MONOCYTES ABSOLUTE: 0.7 K/UL (ref 0–0.9)
MONOCYTES RELATIVE PERCENT: 7 % (ref 0–10)
NEUTROPHILS ABSOLUTE: 7.6 K/UL (ref 1.5–7.5)
NEUTROPHILS RELATIVE PERCENT: 73.9 % (ref 50–65)
PDW BLD-RTO: 12 % (ref 11.5–14.5)
PLATELET # BLD: 159 K/UL (ref 130–400)
PMV BLD AUTO: 10.7 FL (ref 9.4–12.3)
POTASSIUM SERPL-SCNC: 3.8 MMOL/L (ref 3.5–5)
RBC # BLD: 3.76 M/UL (ref 4.2–5.4)
SODIUM BLD-SCNC: 137 MMOL/L (ref 136–145)
TOTAL PROTEIN: 6.7 G/DL (ref 6.6–8.7)
VITAMIN B-12: 240 PG/ML (ref 211–946)
VITAMIN D 25-HYDROXY: 20.6 NG/ML
WBC # BLD: 10.2 K/UL (ref 4.8–10.8)

## 2020-02-06 ENCOUNTER — PATIENT MESSAGE (OUTPATIENT)
Dept: OBGYN | Age: 25
End: 2020-02-06

## 2020-02-06 NOTE — TELEPHONE ENCOUNTER
From: AVLE Foreman CNM  To: Fatuma Bob  Sent: 2/6/2020 9:07 AM CST  Subject: labs    Hi Froylan Delacruz,  Your ferritin & Vitamin D are low. Double your Vitamin D & do you tolerate iron?   Thanks,  [employer]

## 2020-02-17 ENCOUNTER — ROUTINE PRENATAL (OUTPATIENT)
Dept: OBGYN | Age: 25
End: 2020-02-17

## 2020-02-17 VITALS
DIASTOLIC BLOOD PRESSURE: 73 MMHG | BODY MASS INDEX: 26.81 KG/M2 | WEIGHT: 135 LBS | HEART RATE: 108 BPM | SYSTOLIC BLOOD PRESSURE: 113 MMHG

## 2020-02-17 DIAGNOSIS — O09.92 HIGH-RISK PREGNANCY IN SECOND TRIMESTER: ICD-10-CM

## 2020-02-17 DIAGNOSIS — Z3A.26 26 WEEKS GESTATION OF PREGNANCY: ICD-10-CM

## 2020-02-17 LAB — HBA1C MFR BLD: 4.4 % (ref 4–6)

## 2020-02-17 PROCEDURE — 0502F SUBSEQUENT PRENATAL CARE: CPT | Performed by: ADVANCED PRACTICE MIDWIFE

## 2020-02-17 NOTE — PROGRESS NOTES
CNM Prenatal Office Note  Subjective:  Rito Loza is here for a return obstetrical visit. Today she is 28w2d weeks EGA. She is doing well, taking her PNV as directed, and has no complaints. She  does not have vaginal bleeding, n/v, syncope, or headaches. Pt does feel fetal movement regularly. Objective: Mother's Prenatal Vitals  BP: 113/73  Weight: 135 lb (61.2 kg)  Pulse: 108  Patient Position: Sitting  Prenatal Fetal Information  Fundal Height (cm): 28 cm  Fetal Heart Rate: US-153(Simultaneous filing. User may not have seen previous data.)  Movement: Present  Pt is A&Ox3, in no acute distress. Normocephalic, atraumatic. PERRL. Resp even and non-labored. Skin pink, warm & dry. Gravid abdomen. DAVIS's well. Gait steady. Assessment:    IUP at 28w2d wks  Bile acids drawn per [facility] for itching      Diagnosis Orders   1. 28 weeks gestation of pregnancy  CBC Auto Differential    Glucose 1 Hour Postprandial   2. High-risk pregnancy in third  trimester     3. Andreas-Danlos syndrome     4. Postural orthostatic tachycardia syndrome     5. Shortness of breath     6. Gastroparesis     7. History of        Plan:   Pt counseled on counseled on balanced nutrition, adequate fluid intake, taking PNV daily, and exercise along with need for iron infusions & weekly IV fluids, GHTN precautions, Kick count and  labor   Continue with routine prenatal care.   surveillance indicated for POTS, Andreas Danlos Syndrome   RTC in 2 wks for prenatal visit   Will go to labor & delivery for fluids & iron infusion per [facility] recommendation - [facility] desires Ferritin above 60   Will do Hgb A1c due inability to tolerate glucose due to GI problems     MEDICATIONS:  No orders of the defined types were placed in this encounter. ORDERS:  Orders Placed This Encounter   Procedures    CBC Auto Differential    Glucose 1 Hour Postprandial       More than 50% of this 20 min visit was education and counseling.

## 2020-02-17 NOTE — PATIENT INSTRUCTIONS
Patient Education        Weeks 26 to 30 of Your Pregnancy: Care Instructions  Your Care Instructions    You are now in your last trimester of pregnancy. Your baby is growing rapidly. And you'll probably feel your baby moving around more often. Your doctor may ask you to count your baby's kicks. Your back may ache as your body gets used to your baby's size and length. If you haven't already had the Tdap shot during this pregnancy, talk to your doctor about getting it. It will help protect your  against pertussis infection. During this time, it's important to take care of yourself and pay attention to what your body needs. If you feel sexual, explore ways to be close with your partner that match your comfort and desire. Use the tips provided in this care sheet to find ways to be sexual in your own way. Follow-up care is a key part of your treatment and safety. Be sure to make and go to all appointments, and call your doctor if you are having problems. It's also a good idea to know your test results and keep a list of the medicines you take. How can you care for yourself at home? Take it easy at work  · Take frequent breaks. If possible, stop working when you are tired, and rest during your lunch hour. · Take bathroom breaks every 2 hours. · Change positions often. If you sit for long periods, stand up and walk around. · When you stand for a long time, keep one foot on a low stool with your knee bent. After standing a lot, sit with your feet up. · Avoid fumes, chemicals, and tobacco smoke. Be sexual in your own way  · Having sex during pregnancy is okay, unless your doctor tells you not to. · You may be very interested in sex, or you may have no interest at all. · Your growing belly can make it hard to find a good position during intercourse. [place] and explore. · You may get cramps in your uterus when your partner touches your breasts.   · A back rub may relieve the backache or cramps that sometimes follow orgasm. Learn about  labor  · Watch for signs of  labor. You may be going into labor if:  ? You have menstrual-like cramps, with or without nausea. ? You have about 6 or more contractions in 1 hour, even after you have had a glass of water and are resting. ? You have a low, dull backache that does not go away when you change your position. ? You have pain or pressure in your pelvis that comes and goes in a pattern. ? You have intestinal cramping or flu-like symptoms, with or without diarrhea.  ? You notice an increase or change in your vaginal discharge. Discharge may be heavy, mucus-like, watery, or streaked with blood. ? Your water breaks. · If you think you have  labor:  ? Drink 2 or 3 glasses of water or juice. Not drinking enough fluids can cause contractions. ? Stop what you are doing, and empty your bladder. Then lie down on your left side for at least 1 hour. ? While lying on your side, find your breast bone. Put your fingers in the soft spot just below it. Move your fingers down toward your belly button to find the top of your uterus. Check to see if it is tight. ? Contractions can be weak or strong. Record your contractions for an hour. Time a contraction from the start of one contraction to the start of the next one.  ? Single or several strong contractions without a pattern are called [place] contractions. They are practice contractions but not the start of labor. They often stop if you change what you are doing. ? Call your doctor if you have regular contractions. Where can you learn more? Go to [email or web address] org and sign in to your [employer] account. Enter N527 in the [facility] box to learn more about \"Weeks 26 to 30 of Your Pregnancy: Care Instructions. \"     If you do not have an account, please click on the \"Sign Up Now\" link.   Current as of: May 29, 2019  Content Version: 12.3  © 9810-4918 Healthwise, Incorporated. Care instructions adapted under license by [facility] ([facility]). If you have questions about a medical condition or this instruction, always ask your healthcare professional. [address] any warranty or liability for your use of this information.

## 2020-02-19 ENCOUNTER — HOSPITAL ENCOUNTER (OUTPATIENT)
Age: 25
Discharge: HOME | End: 2020-02-19
Attending: OBSTETRICS & GYNECOLOGY | Admitting: OBSTETRICS & GYNECOLOGY
Payer: COMMERCIAL

## 2020-02-19 VITALS
SYSTOLIC BLOOD PRESSURE: 132 MMHG | DIASTOLIC BLOOD PRESSURE: 72 MMHG | HEART RATE: 96 BPM | TEMPERATURE: 97.8 F | RESPIRATION RATE: 16 BRPM

## 2020-02-19 PROBLEM — D64.9 ANEMIA: Status: ACTIVE | Noted: 2020-02-19

## 2020-02-19 PROCEDURE — 96360 HYDRATION IV INFUSION INIT: CPT

## 2020-02-19 PROCEDURE — 2580000003 HC RX 258: Performed by: OBSTETRICS & GYNECOLOGY

## 2020-02-19 PROCEDURE — 96374 THER/PROPH/DIAG INJ IV PUSH: CPT

## 2020-02-19 PROCEDURE — 96365 THER/PROPH/DIAG IV INF INIT: CPT

## 2020-02-19 PROCEDURE — 99211 OFF/OP EST MAY X REQ PHY/QHP: CPT

## 2020-02-19 PROCEDURE — 6360000002 HC RX W HCPCS: Performed by: OBSTETRICS & GYNECOLOGY

## 2020-02-19 RX ORDER — SODIUM CHLORIDE, SODIUM LACTATE, POTASSIUM CHLORIDE, CALCIUM CHLORIDE 600; 310; 30; 20 MG/100ML; MG/100ML; MG/100ML; MG/100ML
INJECTION, SOLUTION INTRAVENOUS ONCE
Status: COMPLETED | OUTPATIENT
Start: 2020-02-19 | End: 2020-02-19

## 2020-02-19 RX ORDER — ONDANSETRON 4 MG/1
4 TABLET, ORALLY DISINTEGRATING ORAL EVERY 8 HOURS PRN
Status: DISCONTINUED | OUTPATIENT
Start: 2020-02-19 | End: 2020-02-19 | Stop reason: HOSPADM

## 2020-02-19 RX ORDER — SODIUM CHLORIDE 0.9 % (FLUSH) 0.9 %
10 SYRINGE (ML) INJECTION EVERY 12 HOURS SCHEDULED
Status: DISCONTINUED | OUTPATIENT
Start: 2020-02-19 | End: 2020-02-19 | Stop reason: HOSPADM

## 2020-02-19 RX ORDER — ONDANSETRON 2 MG/ML
4 INJECTION INTRAMUSCULAR; INTRAVENOUS EVERY 6 HOURS PRN
Status: DISCONTINUED | OUTPATIENT
Start: 2020-02-19 | End: 2020-02-19 | Stop reason: HOSPADM

## 2020-02-19 RX ORDER — SODIUM CHLORIDE 0.9 % (FLUSH) 0.9 %
10 SYRINGE (ML) INJECTION PRN
Status: DISCONTINUED | OUTPATIENT
Start: 2020-02-19 | End: 2020-02-19 | Stop reason: HOSPADM

## 2020-02-19 RX ADMIN — IRON SUCROSE 100 MG: 20 INJECTION, SOLUTION INTRAVENOUS at 09:52

## 2020-02-19 RX ADMIN — SODIUM CHLORIDE, POTASSIUM CHLORIDE, SODIUM LACTATE AND CALCIUM CHLORIDE: 600; 310; 30; 20 INJECTION, SOLUTION INTRAVENOUS at 09:51

## 2020-02-26 ENCOUNTER — HOSPITAL ENCOUNTER (OUTPATIENT)
Age: 25
Discharge: HOME | End: 2020-02-26
Attending: OBSTETRICS & GYNECOLOGY | Admitting: OBSTETRICS & GYNECOLOGY
Payer: COMMERCIAL

## 2020-02-26 VITALS
SYSTOLIC BLOOD PRESSURE: 110 MMHG | HEART RATE: 111 BPM | RESPIRATION RATE: 16 BRPM | DIASTOLIC BLOOD PRESSURE: 63 MMHG | TEMPERATURE: 98.4 F

## 2020-02-26 PROCEDURE — 96365 THER/PROPH/DIAG IV INF INIT: CPT

## 2020-02-26 PROCEDURE — 6360000002 HC RX W HCPCS: Performed by: ADVANCED PRACTICE MIDWIFE

## 2020-02-26 PROCEDURE — 2580000003 HC RX 258: Performed by: ADVANCED PRACTICE MIDWIFE

## 2020-02-26 PROCEDURE — 99211 OFF/OP EST MAY X REQ PHY/QHP: CPT

## 2020-02-26 PROCEDURE — 96375 TX/PRO/DX INJ NEW DRUG ADDON: CPT

## 2020-02-26 RX ORDER — SODIUM CHLORIDE 0.9 % (FLUSH) 0.9 %
10 SYRINGE (ML) INJECTION PRN
Status: DISCONTINUED | OUTPATIENT
Start: 2020-02-26 | End: 2020-02-26 | Stop reason: HOSPADM

## 2020-02-26 RX ORDER — SODIUM CHLORIDE, SODIUM LACTATE, POTASSIUM CHLORIDE, CALCIUM CHLORIDE 600; 310; 30; 20 MG/100ML; MG/100ML; MG/100ML; MG/100ML
INJECTION, SOLUTION INTRAVENOUS CONTINUOUS
Status: DISCONTINUED | OUTPATIENT
Start: 2020-02-26 | End: 2020-02-26 | Stop reason: HOSPADM

## 2020-02-26 RX ORDER — SODIUM CHLORIDE 0.9 % (FLUSH) 0.9 %
10 SYRINGE (ML) INJECTION EVERY 12 HOURS SCHEDULED
Status: DISCONTINUED | OUTPATIENT
Start: 2020-02-26 | End: 2020-02-26 | Stop reason: HOSPADM

## 2020-02-26 RX ORDER — ONDANSETRON 4 MG/1
4 TABLET, ORALLY DISINTEGRATING ORAL EVERY 8 HOURS PRN
Status: DISCONTINUED | OUTPATIENT
Start: 2020-02-26 | End: 2020-02-26 | Stop reason: HOSPADM

## 2020-02-26 RX ORDER — ONDANSETRON 2 MG/ML
4 INJECTION INTRAMUSCULAR; INTRAVENOUS EVERY 6 HOURS PRN
Status: DISCONTINUED | OUTPATIENT
Start: 2020-02-26 | End: 2020-02-26 | Stop reason: HOSPADM

## 2020-02-26 RX ADMIN — SODIUM CHLORIDE, POTASSIUM CHLORIDE, SODIUM LACTATE AND CALCIUM CHLORIDE: 600; 310; 30; 20 INJECTION, SOLUTION INTRAVENOUS at 12:30

## 2020-02-26 RX ADMIN — IRON SUCROSE 100 MG: 20 INJECTION, SOLUTION INTRAVENOUS at 12:36

## 2020-02-26 NOTE — PROGRESS NOTES
LR infusion completed. Patient tolerated well. Discharge instructions completed. Voiced understanding.

## 2020-03-04 ENCOUNTER — HOSPITAL ENCOUNTER (OUTPATIENT)
Age: 25
Discharge: HOME | End: 2020-03-04
Attending: OBSTETRICS & GYNECOLOGY | Admitting: OBSTETRICS & GYNECOLOGY
Payer: COMMERCIAL

## 2020-03-04 ENCOUNTER — ROUTINE PRENATAL (OUTPATIENT)
Dept: OBGYN | Age: 25
End: 2020-03-04

## 2020-03-04 VITALS
HEART RATE: 106 BPM | DIASTOLIC BLOOD PRESSURE: 71 MMHG | SYSTOLIC BLOOD PRESSURE: 110 MMHG | RESPIRATION RATE: 20 BRPM | TEMPERATURE: 98 F

## 2020-03-04 VITALS
BODY MASS INDEX: 27.8 KG/M2 | HEART RATE: 142 BPM | WEIGHT: 140 LBS | SYSTOLIC BLOOD PRESSURE: 112 MMHG | DIASTOLIC BLOOD PRESSURE: 66 MMHG

## 2020-03-04 PROBLEM — M54.50 LOW BACK PAIN: Status: ACTIVE | Noted: 2020-03-04

## 2020-03-04 PROCEDURE — 2580000003 HC RX 258: Performed by: OBSTETRICS & GYNECOLOGY

## 2020-03-04 PROCEDURE — 0502F SUBSEQUENT PRENATAL CARE: CPT | Performed by: ADVANCED PRACTICE MIDWIFE

## 2020-03-04 PROCEDURE — 96374 THER/PROPH/DIAG INJ IV PUSH: CPT

## 2020-03-04 PROCEDURE — 96361 HYDRATE IV INFUSION ADD-ON: CPT

## 2020-03-04 PROCEDURE — 6360000002 HC RX W HCPCS: Performed by: OBSTETRICS & GYNECOLOGY

## 2020-03-04 PROCEDURE — 99211 OFF/OP EST MAY X REQ PHY/QHP: CPT

## 2020-03-04 PROCEDURE — 96360 HYDRATION IV INFUSION INIT: CPT

## 2020-03-04 RX ORDER — SODIUM CHLORIDE 0.9 % (FLUSH) 0.9 %
10 SYRINGE (ML) INJECTION PRN
Status: DISCONTINUED | OUTPATIENT
Start: 2020-03-04 | End: 2020-03-04 | Stop reason: HOSPADM

## 2020-03-04 RX ORDER — SODIUM CHLORIDE, SODIUM LACTATE, POTASSIUM CHLORIDE, CALCIUM CHLORIDE 600; 310; 30; 20 MG/100ML; MG/100ML; MG/100ML; MG/100ML
INJECTION, SOLUTION INTRAVENOUS ONCE
Status: COMPLETED | OUTPATIENT
Start: 2020-03-04 | End: 2020-03-04

## 2020-03-04 RX ORDER — ACETAMINOPHEN 325 MG/1
650 TABLET ORAL EVERY 4 HOURS PRN
Status: DISCONTINUED | OUTPATIENT
Start: 2020-03-04 | End: 2020-03-04 | Stop reason: HOSPADM

## 2020-03-04 RX ORDER — SODIUM CHLORIDE 0.9 % (FLUSH) 0.9 %
10 SYRINGE (ML) INJECTION EVERY 12 HOURS SCHEDULED
Status: DISCONTINUED | OUTPATIENT
Start: 2020-03-04 | End: 2020-03-04 | Stop reason: HOSPADM

## 2020-03-04 RX ADMIN — SODIUM CHLORIDE, POTASSIUM CHLORIDE, SODIUM LACTATE AND CALCIUM CHLORIDE: 600; 310; 30; 20 INJECTION, SOLUTION INTRAVENOUS at 10:09

## 2020-03-04 RX ADMIN — IRON SUCROSE 100 MG: 20 INJECTION, SOLUTION INTRAVENOUS at 10:20

## 2020-03-04 NOTE — PATIENT INSTRUCTIONS
Patient Education        Weeks 30 to 28 of Your Pregnancy: Care Instructions  Your Care Instructions    You have made it to the final months of your pregnancy. By now, your baby is really starting to look like a baby, with hair and plump skin. As you enter the final weeks of pregnancy, the reality of having a baby may start to set in. This is the time to settle on a name, get your household in order, set up a safe nursery, and find quality  if needed. Doing these things in advance will allow you to focus on caring for and enjoying your new baby. You may also want to have a tour of your hospital's labor and delivery unit to get a better idea of what to expect while you are in the hospital.  During these last months, it is very important to take good care of yourself and pay attention to what your body needs. If your doctor says it is okay for you to work, don't push yourself too hard. Use the tips provided in this care sheet to ease heartburn and care for varicose veins. If you haven't already had the Tdap shot during this pregnancy, talk to your doctor about getting it. It will help protect your  against pertussis infection. Follow-up care is a key part of your treatment and safety. Be sure to make and go to all appointments, and call your doctor if you are having problems. It's also a good idea to know your test results and keep a list of the medicines you take. How can you care for yourself at home? Pay attention to your baby's movements  · You should feel your baby move several times every day. · Your baby now turns less, and kicks and jabs more. · Your baby sleeps 20 to 45 minutes at a time and is more active at certain times of day. · If your doctor wants you to count your baby's kicks:  ? Empty your bladder, and lie on your side or relax in a comfortable chair. ? Write down your start time. ? Pay attention only to your baby's movements. Count any movement except hiccups. ?  After you What You Need to Know  Why get vaccinated? Tetanus, diphtheria, and pertussis are very serious diseases. Tdap vaccine can protect us from these diseases. And Tdap vaccine given to pregnant women can protect  babies against pertussis. Tetanus (lockjaw) is rare in the [facility] today. It causes painful muscle tightening and stiffness, usually all over the body. · It can lead to tightening of muscles in the head and neck so you can't open your mouth, swallow, or sometimes even breathe. Tetanus kills about 1 out of 10 people who are infected even after receiving the best medical care. Diphtheria is also rare in the [place] today. It can cause a thick coating to form in the back of the throat. · It can lead to breathing problems, heart failure, paralysis, and death. Pertussis (whooping cough) causes severe coughing spells, which can cause difficulty breathing, vomiting, and disturbed sleep. · It can also lead to weight loss, incontinence, and rib fractures. Up to 2 in 100 adolescents and 5 in 100 adults with pertussis are hospitalized or have complications, which could include pneumonia or death. These diseases are caused by bacteria. Diphtheria and pertussis are spread from person to person through secretions from coughing or sneezing. Tetanus enters the body through cuts, scratches, or wounds. Before vaccines, as many as 200,000 cases of diphtheria, 200,000 cases of pertussis, and hundreds of cases of tetanus were reported in the [place] each year. Since vaccination began, reports of cases for tetanus and diphtheria have dropped by about 99% and for pertussis by about 80%. Tdap vaccine  The Tdap vaccine can protect adolescents and adults from tetanus, diphtheria, and pertussis. One dose of Tdap is routinely given at age 6 or 15. People who did not get Tdap at that age should get it as soon as possible.   Tdap is especially important for health care professionals and anyone having close contact with a baby younger than 12 months. Pregnant women should get a dose of Tdap during every pregnancy, to protect the  from pertussis. Infants are most at risk for severe, life-threatening complications from pertussis. Another vaccine, called Td, protects against tetanus and diphtheria, but not pertussis. A Td booster should be given every 10 years. Tdap may be given as one of these boosters if you have never gotten Tdap before. Tdap may also be given after a severe cut or burn to prevent tetanus infection. Your doctor or the person giving you the vaccine can give you more information. Tdap may safely be given at the same time as other vaccines. Some people should not get this vaccine  · A person who has ever had a life-threatening allergic reaction after a previous dose of any diphtheria-, tetanus-, or pertussis-containing vaccine, OR has a severe allergy to any part of this vaccine, should not get Tdap vaccine. Tell the person giving the vaccine about any severe allergies. · Anyone who had coma or long repeated seizures within 7 days after a childhood dose of DTP or DTaP, or a previous dose of Tdap, should not get Tdap, unless a cause other than the vaccine was found. They can still get Td. · Talk to your doctor if you:  ? Have seizures or another nervous system problem. ? Had severe pain or swelling after any vaccine containing diphtheria, tetanus, or pertussis. ? Ever had a condition called Guillain-Barré Syndrome (GBS). ? Aren't feeling well on the day the shot is scheduled. Risks  With any medicine, including vaccines, there is a chance of side effects. These are usually mild and go away on their own. Serious reactions are also possible but are rare. Most people who get Tdap vaccine do not have any problems with it.   Mild problems following Tdap  (Did not interfere with activities)  · Pain where the shot was given (about 3 in 4 adolescents or 2 in 3 adults)  · Redness or swelling where

## 2020-03-04 NOTE — PROGRESS NOTES
Pt denies any vaginal leaking bleeding or contractions. + Fetal movement. Patient has been having headaches for a week and a half now, it was always there but its becoming regular with the iron infusion.

## 2020-03-06 ENCOUNTER — ROUTINE PRENATAL (OUTPATIENT)
Dept: OBGYN | Age: 25
End: 2020-03-06

## 2020-03-06 VITALS
HEART RATE: 122 BPM | WEIGHT: 144 LBS | SYSTOLIC BLOOD PRESSURE: 124 MMHG | BODY MASS INDEX: 28.6 KG/M2 | DIASTOLIC BLOOD PRESSURE: 78 MMHG

## 2020-03-06 PROCEDURE — G8427 DOCREV CUR MEDS BY ELIG CLIN: HCPCS | Performed by: NURSE PRACTITIONER

## 2020-03-06 PROCEDURE — G8419 CALC BMI OUT NRM PARAM NOF/U: HCPCS | Performed by: NURSE PRACTITIONER

## 2020-03-06 PROCEDURE — 1036F TOBACCO NON-USER: CPT | Performed by: NURSE PRACTITIONER

## 2020-03-06 PROCEDURE — 0502F SUBSEQUENT PRENATAL CARE: CPT | Performed by: NURSE PRACTITIONER

## 2020-03-06 PROCEDURE — G8484 FLU IMMUNIZE NO ADMIN: HCPCS | Performed by: NURSE PRACTITIONER

## 2020-03-11 ENCOUNTER — HOSPITAL ENCOUNTER (OUTPATIENT)
Age: 25
Setting detail: OBSERVATION
Discharge: HOME | DRG: 833 | End: 2020-03-11
Attending: ADVANCED PRACTICE MIDWIFE | Admitting: OBSTETRICS & GYNECOLOGY
Payer: COMMERCIAL

## 2020-03-11 VITALS — SYSTOLIC BLOOD PRESSURE: 113 MMHG | HEART RATE: 113 BPM | DIASTOLIC BLOOD PRESSURE: 65 MMHG

## 2020-03-11 PROBLEM — R51.9 HEADACHE: Status: ACTIVE | Noted: 2020-03-11

## 2020-03-11 PROCEDURE — 2580000003 HC RX 258: Performed by: ADVANCED PRACTICE MIDWIFE

## 2020-03-11 PROCEDURE — G0378 HOSPITAL OBSERVATION PER HR: HCPCS

## 2020-03-11 PROCEDURE — 1220000000 HC SEMI PRIVATE OB R&B

## 2020-03-11 PROCEDURE — 96365 THER/PROPH/DIAG IV INF INIT: CPT

## 2020-03-11 PROCEDURE — 6360000002 HC RX W HCPCS: Performed by: ADVANCED PRACTICE MIDWIFE

## 2020-03-11 PROCEDURE — 96375 TX/PRO/DX INJ NEW DRUG ADDON: CPT

## 2020-03-11 PROCEDURE — G0379 DIRECT REFER HOSPITAL OBSERV: HCPCS

## 2020-03-11 PROCEDURE — 99211 OFF/OP EST MAY X REQ PHY/QHP: CPT

## 2020-03-11 RX ORDER — SODIUM CHLORIDE 0.9 % (FLUSH) 0.9 %
10 SYRINGE (ML) INJECTION PRN
Status: DISCONTINUED | OUTPATIENT
Start: 2020-03-11 | End: 2020-03-11 | Stop reason: HOSPADM

## 2020-03-11 RX ORDER — SODIUM CHLORIDE 0.9 % (FLUSH) 0.9 %
10 SYRINGE (ML) INJECTION EVERY 12 HOURS SCHEDULED
Status: DISCONTINUED | OUTPATIENT
Start: 2020-03-11 | End: 2020-03-11 | Stop reason: HOSPADM

## 2020-03-11 RX ORDER — ACETAMINOPHEN 325 MG/1
650 TABLET ORAL EVERY 4 HOURS PRN
Status: DISCONTINUED | OUTPATIENT
Start: 2020-03-11 | End: 2020-03-11 | Stop reason: HOSPADM

## 2020-03-11 RX ORDER — ONDANSETRON 2 MG/ML
4 INJECTION INTRAMUSCULAR; INTRAVENOUS EVERY 6 HOURS PRN
Status: DISCONTINUED | OUTPATIENT
Start: 2020-03-11 | End: 2020-03-11 | Stop reason: HOSPADM

## 2020-03-11 RX ORDER — PROMETHAZINE HYDROCHLORIDE 25 MG/1
12.5 TABLET ORAL EVERY 6 HOURS PRN
Status: DISCONTINUED | OUTPATIENT
Start: 2020-03-11 | End: 2020-03-11 | Stop reason: HOSPADM

## 2020-03-11 RX ORDER — SODIUM CHLORIDE, SODIUM LACTATE, POTASSIUM CHLORIDE, CALCIUM CHLORIDE 600; 310; 30; 20 MG/100ML; MG/100ML; MG/100ML; MG/100ML
INJECTION, SOLUTION INTRAVENOUS ONCE
Status: COMPLETED | OUTPATIENT
Start: 2020-03-11 | End: 2020-03-11

## 2020-03-11 RX ADMIN — IRON SUCROSE 200 MG: 20 INJECTION, SOLUTION INTRAVENOUS at 11:26

## 2020-03-11 RX ADMIN — SODIUM CHLORIDE, SODIUM LACTATE, POTASSIUM CHLORIDE, AND CALCIUM CHLORIDE: 600; 310; 30; 20 INJECTION, SOLUTION INTRAVENOUS at 11:27

## 2020-03-11 NOTE — FLOWSHEET NOTE
Patricia notified on the patient. New orders received to discharge. Will educate patient to come back if needed. Patient states that she feels perfectly well and that she can drive home.

## 2020-03-16 RX ORDER — METRONIDAZOLE 500 MG/1
500 TABLET ORAL 2 TIMES DAILY
Qty: 14 TABLET | Refills: 0 | Status: SHIPPED | OUTPATIENT
Start: 2020-03-16 | End: 2020-03-23

## 2020-03-16 RX ORDER — AZITHROMYCIN 500 MG/1
1000 TABLET, FILM COATED ORAL ONCE
Qty: 2 TABLET | Refills: 0 | Status: SHIPPED | OUTPATIENT
Start: 2020-03-16 | End: 2020-03-16

## 2020-03-18 ENCOUNTER — HOSPITAL ENCOUNTER (OUTPATIENT)
Age: 25
Discharge: HOME | End: 2020-03-18
Attending: OBSTETRICS & GYNECOLOGY | Admitting: OBSTETRICS & GYNECOLOGY
Payer: COMMERCIAL

## 2020-03-18 ENCOUNTER — ROUTINE PRENATAL (OUTPATIENT)
Dept: OBGYN | Age: 25
End: 2020-03-18
Payer: COMMERCIAL

## 2020-03-18 VITALS
WEIGHT: 143 LBS | HEART RATE: 135 BPM | DIASTOLIC BLOOD PRESSURE: 82 MMHG | BODY MASS INDEX: 28.4 KG/M2 | SYSTOLIC BLOOD PRESSURE: 128 MMHG

## 2020-03-18 VITALS
RESPIRATION RATE: 20 BRPM | SYSTOLIC BLOOD PRESSURE: 96 MMHG | DIASTOLIC BLOOD PRESSURE: 63 MMHG | HEART RATE: 107 BPM | TEMPERATURE: 98 F

## 2020-03-18 DIAGNOSIS — L29.9 ITCHING: ICD-10-CM

## 2020-03-18 DIAGNOSIS — Z3A.28 28 WEEKS GESTATION OF PREGNANCY: ICD-10-CM

## 2020-03-18 LAB
ALBUMIN SERPL-MCNC: 3.9 G/DL (ref 3.5–5.2)
ALP BLD-CCNC: 114 U/L (ref 35–104)
ALT SERPL-CCNC: 8 U/L (ref 5–33)
ANION GAP SERPL CALCULATED.3IONS-SCNC: 12 MMOL/L (ref 7–19)
AST SERPL-CCNC: 12 U/L (ref 5–32)
BASOPHILS ABSOLUTE: 0 K/UL (ref 0–0.2)
BASOPHILS RELATIVE PERCENT: 0.4 % (ref 0–1)
BILIRUB SERPL-MCNC: <0.2 MG/DL (ref 0.2–1.2)
BUN BLDV-MCNC: 5 MG/DL (ref 6–20)
CALCIUM SERPL-MCNC: 8.6 MG/DL (ref 8.6–10)
CHLORIDE BLD-SCNC: 103 MMOL/L (ref 98–111)
CO2: 23 MMOL/L (ref 22–29)
CREAT SERPL-MCNC: <0.5 MG/DL (ref 0.5–0.9)
EOSINOPHILS ABSOLUTE: 0.1 K/UL (ref 0–0.6)
EOSINOPHILS RELATIVE PERCENT: 0.9 % (ref 0–5)
GFR NON-AFRICAN AMERICAN: >60
GLUCOSE BLD-MCNC: 84 MG/DL (ref 74–109)
HCT VFR BLD CALC: 37.5 % (ref 37–47)
HEMOGLOBIN: 12.4 G/DL (ref 12–16)
IMMATURE GRANULOCYTES #: 0.3 K/UL
LYMPHOCYTES ABSOLUTE: 1.7 K/UL (ref 1.1–4.5)
LYMPHOCYTES RELATIVE PERCENT: 16.5 % (ref 20–40)
MCH RBC QN AUTO: 32.5 PG (ref 27–31)
MCHC RBC AUTO-ENTMCNC: 33.1 G/DL (ref 33–37)
MCV RBC AUTO: 98.2 FL (ref 81–99)
MONOCYTES ABSOLUTE: 0.7 K/UL (ref 0–0.9)
MONOCYTES RELATIVE PERCENT: 6.9 % (ref 0–10)
NEUTROPHILS ABSOLUTE: 7.4 K/UL (ref 1.5–7.5)
NEUTROPHILS RELATIVE PERCENT: 72.6 % (ref 50–65)
PDW BLD-RTO: 13.6 % (ref 11.5–14.5)
PLATELET # BLD: 142 K/UL (ref 130–400)
PMV BLD AUTO: 11 FL (ref 9.4–12.3)
POTASSIUM SERPL-SCNC: 3.7 MMOL/L (ref 3.5–5)
RBC # BLD: 3.82 M/UL (ref 4.2–5.4)
SODIUM BLD-SCNC: 138 MMOL/L (ref 136–145)
TOTAL PROTEIN: 6.7 G/DL (ref 6.6–8.7)
WBC # BLD: 10.1 K/UL (ref 4.8–10.8)

## 2020-03-18 PROCEDURE — 96365 THER/PROPH/DIAG IV INF INIT: CPT

## 2020-03-18 PROCEDURE — 96374 THER/PROPH/DIAG INJ IV PUSH: CPT

## 2020-03-18 PROCEDURE — 59025 FETAL NON-STRESS TEST: CPT | Performed by: ADVANCED PRACTICE MIDWIFE

## 2020-03-18 PROCEDURE — 2580000003 HC RX 258: Performed by: OBSTETRICS & GYNECOLOGY

## 2020-03-18 PROCEDURE — 6360000002 HC RX W HCPCS: Performed by: OBSTETRICS & GYNECOLOGY

## 2020-03-18 PROCEDURE — 96366 THER/PROPH/DIAG IV INF ADDON: CPT

## 2020-03-18 PROCEDURE — 0502F SUBSEQUENT PRENATAL CARE: CPT | Performed by: ADVANCED PRACTICE MIDWIFE

## 2020-03-18 PROCEDURE — 99211 OFF/OP EST MAY X REQ PHY/QHP: CPT

## 2020-03-18 RX ORDER — ACETAMINOPHEN 325 MG/1
650 TABLET ORAL EVERY 4 HOURS PRN
Status: DISCONTINUED | OUTPATIENT
Start: 2020-03-18 | End: 2020-03-18 | Stop reason: HOSPADM

## 2020-03-18 RX ORDER — ONDANSETRON 2 MG/ML
4 INJECTION INTRAMUSCULAR; INTRAVENOUS EVERY 6 HOURS PRN
Status: DISCONTINUED | OUTPATIENT
Start: 2020-03-18 | End: 2020-03-18 | Stop reason: HOSPADM

## 2020-03-18 RX ORDER — SODIUM CHLORIDE 0.9 % (FLUSH) 0.9 %
10 SYRINGE (ML) INJECTION EVERY 12 HOURS SCHEDULED
Status: DISCONTINUED | OUTPATIENT
Start: 2020-03-18 | End: 2020-03-18 | Stop reason: HOSPADM

## 2020-03-18 RX ORDER — SODIUM CHLORIDE 0.9 % (FLUSH) 0.9 %
10 SYRINGE (ML) INJECTION PRN
Status: DISCONTINUED | OUTPATIENT
Start: 2020-03-18 | End: 2020-03-18 | Stop reason: HOSPADM

## 2020-03-18 RX ORDER — PROMETHAZINE HYDROCHLORIDE 25 MG/1
12.5 TABLET ORAL EVERY 6 HOURS PRN
Status: DISCONTINUED | OUTPATIENT
Start: 2020-03-18 | End: 2020-03-18

## 2020-03-18 RX ORDER — SODIUM CHLORIDE, SODIUM LACTATE, POTASSIUM CHLORIDE, CALCIUM CHLORIDE 600; 310; 30; 20 MG/100ML; MG/100ML; MG/100ML; MG/100ML
INJECTION, SOLUTION INTRAVENOUS ONCE
Status: COMPLETED | OUTPATIENT
Start: 2020-03-18 | End: 2020-03-18

## 2020-03-18 RX ADMIN — SODIUM CHLORIDE, POTASSIUM CHLORIDE, SODIUM LACTATE AND CALCIUM CHLORIDE: 600; 310; 30; 20 INJECTION, SOLUTION INTRAVENOUS at 10:49

## 2020-03-18 RX ADMIN — IRON SUCROSE 200 MG: 20 INJECTION, SOLUTION INTRAVENOUS at 10:52

## 2020-03-18 NOTE — FLOWSHEET NOTE
Pt here for her weekly LR fluids and venofer. Denies any leaking of fluid or vaginal bleeding. abd soft and nontender .   efm applied

## 2020-03-20 LAB — BILE ACIDS TOTAL: 3 UMOL/L (ref 0–10)

## 2020-03-25 ENCOUNTER — ROUTINE PRENATAL (OUTPATIENT)
Dept: OBGYN | Age: 25
End: 2020-03-25
Payer: COMMERCIAL

## 2020-03-25 VITALS
BODY MASS INDEX: 28.4 KG/M2 | WEIGHT: 143 LBS | HEART RATE: 127 BPM | SYSTOLIC BLOOD PRESSURE: 134 MMHG | DIASTOLIC BLOOD PRESSURE: 80 MMHG

## 2020-03-25 DIAGNOSIS — Z98.891 HISTORY OF VBAC: ICD-10-CM

## 2020-03-25 DIAGNOSIS — Z3A.33 33 WEEKS GESTATION OF PREGNANCY: ICD-10-CM

## 2020-03-25 DIAGNOSIS — G90.A POSTURAL ORTHOSTATIC TACHYCARDIA SYNDROME: ICD-10-CM

## 2020-03-25 DIAGNOSIS — O09.93 HIGH-RISK PREGNANCY IN THIRD TRIMESTER: ICD-10-CM

## 2020-03-25 DIAGNOSIS — Q79.60 EHLERS-DANLOS SYNDROME: ICD-10-CM

## 2020-03-25 LAB
ALBUMIN SERPL-MCNC: 3.7 G/DL (ref 3.5–5.2)
ALP BLD-CCNC: 194 U/L (ref 35–104)
ALT SERPL-CCNC: 7 U/L (ref 5–33)
ANION GAP SERPL CALCULATED.3IONS-SCNC: 14 MMOL/L (ref 7–19)
AST SERPL-CCNC: 15 U/L (ref 5–32)
BILIRUB SERPL-MCNC: <0.2 MG/DL (ref 0.2–1.2)
BUN BLDV-MCNC: 4 MG/DL (ref 6–20)
CALCIUM SERPL-MCNC: 8.4 MG/DL (ref 8.6–10)
CHLORIDE BLD-SCNC: 104 MMOL/L (ref 98–111)
CO2: 23 MMOL/L (ref 22–29)
CREAT SERPL-MCNC: <0.5 MG/DL (ref 0.5–0.9)
GFR NON-AFRICAN AMERICAN: >60
GLUCOSE BLD-MCNC: 63 MG/DL (ref 74–109)
POTASSIUM SERPL-SCNC: 3.3 MMOL/L (ref 3.5–5)
SODIUM BLD-SCNC: 141 MMOL/L (ref 136–145)
TOTAL PROTEIN: 6.7 G/DL (ref 6.6–8.7)

## 2020-03-25 PROCEDURE — G8419 CALC BMI OUT NRM PARAM NOF/U: HCPCS | Performed by: ADVANCED PRACTICE MIDWIFE

## 2020-03-25 PROCEDURE — 59025 FETAL NON-STRESS TEST: CPT | Performed by: ADVANCED PRACTICE MIDWIFE

## 2020-03-25 PROCEDURE — 1036F TOBACCO NON-USER: CPT | Performed by: ADVANCED PRACTICE MIDWIFE

## 2020-03-25 PROCEDURE — G8484 FLU IMMUNIZE NO ADMIN: HCPCS | Performed by: ADVANCED PRACTICE MIDWIFE

## 2020-03-25 PROCEDURE — 1111F DSCHRG MED/CURRENT MED MERGE: CPT | Performed by: ADVANCED PRACTICE MIDWIFE

## 2020-03-25 PROCEDURE — 0502F SUBSEQUENT PRENATAL CARE: CPT | Performed by: ADVANCED PRACTICE MIDWIFE

## 2020-03-25 PROCEDURE — G8427 DOCREV CUR MEDS BY ELIG CLIN: HCPCS | Performed by: ADVANCED PRACTICE MIDWIFE

## 2020-03-25 NOTE — PATIENT INSTRUCTIONS
Instructions  Your Care Instructions    By now, your baby and your belly have grown quite large. It is almost time to give birth. Your baby's lungs are almost ready to breathe air. The bones in your baby's head are now firm enough to protect it, but soft enough to move down through the birth canal.  You may feel excited, happy, anxious, or scared. You may wonder how you will know if you are in labor or what to expect during labor. Try to be flexible in your expectations of the birth. Because each birth is different, there is no way to know exactly what childbirth will be like for you. This care sheet will help you know what to expect and how to prepare. This may make your childbirth easier. If you haven't already had the Tdap shot during this pregnancy, talk to your doctor about getting it. It will help protect your  against pertussis infection. In the 36th week, most women have a test for group B streptococcus (GBS). GBS is a common bacteria that can live in the vagina and rectum. It can make your baby sick after birth. If you test positive, you will get antibiotics during labor. The medicine will keep your baby from getting the bacteria. Follow-up care is a key part of your treatment and safety. Be sure to make and go to all appointments, and call your doctor if you are having problems. It's also a good idea to know your test results and keep a list of the medicines you take. How can you care for yourself at home? Learn about pain relief choices  · Pain is different for every woman. Talk with your doctor about your feelings about pain. · You can choose from several types of pain relief. These include medicine or breathing techniques, as well as comfort measures. You can use more than one option. · If you choose to have pain medicine during labor, talk to your doctor about your options. Some medicines lower anxiety and help with some of the pain.  Others make your lower body numb so that you won't feel

## 2020-03-27 LAB — BILE ACIDS TOTAL: 4 UMOL/L (ref 0–10)

## 2020-04-01 ENCOUNTER — ROUTINE PRENATAL (OUTPATIENT)
Dept: OBGYN | Age: 25
End: 2020-04-01
Payer: COMMERCIAL

## 2020-04-01 VITALS
HEART RATE: 117 BPM | WEIGHT: 147 LBS | BODY MASS INDEX: 29.19 KG/M2 | SYSTOLIC BLOOD PRESSURE: 134 MMHG | DIASTOLIC BLOOD PRESSURE: 75 MMHG

## 2020-04-01 DIAGNOSIS — Q79.60 EHLERS-DANLOS SYNDROME: ICD-10-CM

## 2020-04-01 DIAGNOSIS — O09.93 HIGH-RISK PREGNANCY IN THIRD TRIMESTER: ICD-10-CM

## 2020-04-01 DIAGNOSIS — G90.A POSTURAL ORTHOSTATIC TACHYCARDIA SYNDROME: ICD-10-CM

## 2020-04-01 PROBLEM — N39.3 STRESS INCONTINENCE: Status: RESOLVED | Noted: 2019-03-19 | Resolved: 2020-04-01

## 2020-04-01 PROBLEM — O47.00 PRETERM UTERINE CONTRACTIONS: Status: RESOLVED | Noted: 2018-01-22 | Resolved: 2020-04-01

## 2020-04-01 PROBLEM — Z98.891 PREVIOUS CESAREAN SECTION: Status: RESOLVED | Noted: 2017-08-21 | Resolved: 2020-04-01

## 2020-04-01 PROBLEM — O21.9 NAUSEA AND VOMITING DURING PREGNANCY: Status: RESOLVED | Noted: 2020-01-06 | Resolved: 2020-04-01

## 2020-04-01 PROBLEM — Z32.01 PREGNANCY CONFIRMED BY POSITIVE URINE TEST: Status: RESOLVED | Noted: 2017-08-21 | Resolved: 2020-04-01

## 2020-04-01 PROBLEM — Z98.891 HISTORY OF VBAC: Status: RESOLVED | Noted: 2019-09-08 | Resolved: 2020-04-01

## 2020-04-01 LAB
ALBUMIN SERPL-MCNC: 3.5 G/DL (ref 3.5–5.2)
ALP BLD-CCNC: 138 U/L (ref 35–104)
ALT SERPL-CCNC: 8 U/L (ref 5–33)
ANION GAP SERPL CALCULATED.3IONS-SCNC: 13 MMOL/L (ref 7–19)
AST SERPL-CCNC: 12 U/L (ref 5–32)
BILIRUB SERPL-MCNC: <0.2 MG/DL (ref 0.2–1.2)
BUN BLDV-MCNC: 6 MG/DL (ref 6–20)
CALCIUM SERPL-MCNC: 8.9 MG/DL (ref 8.6–10)
CHLORIDE BLD-SCNC: 104 MMOL/L (ref 98–111)
CO2: 20 MMOL/L (ref 22–29)
CREAT SERPL-MCNC: <0.5 MG/DL (ref 0.5–0.9)
GFR NON-AFRICAN AMERICAN: >60
GLUCOSE BLD-MCNC: 91 MG/DL (ref 74–109)
POTASSIUM SERPL-SCNC: 3.6 MMOL/L (ref 3.5–5)
SODIUM BLD-SCNC: 137 MMOL/L (ref 136–145)
TOTAL PROTEIN: 6.3 G/DL (ref 6.6–8.7)

## 2020-04-01 PROCEDURE — 0502F SUBSEQUENT PRENATAL CARE: CPT | Performed by: ADVANCED PRACTICE MIDWIFE

## 2020-04-01 PROCEDURE — 59025 FETAL NON-STRESS TEST: CPT | Performed by: ADVANCED PRACTICE MIDWIFE

## 2020-04-01 NOTE — PATIENT INSTRUCTIONS
Patient Education        Weeks 34 to 39 of Your Pregnancy: Care Instructions  Your Care Instructions    By now, your baby and your belly have grown quite large. It is almost time to give birth. Your baby's lungs are almost ready to breathe air. The bones in your baby's head are now firm enough to protect it, but soft enough to move down through the birth canal.  You may feel excited, happy, anxious, or scared. You may wonder how you will know if you are in labor or what to expect during labor. Try to be flexible in your expectations of the birth. Because each birth is different, there is no way to know exactly what childbirth will be like for you. This care sheet will help you know what to expect and how to prepare. This may make your childbirth easier. If you haven't already had the Tdap shot during this pregnancy, talk to your doctor about getting it. It will help protect your  against pertussis infection. In the 36th week, most women have a test for group B streptococcus (GBS). GBS is a common bacteria that can live in the vagina and rectum. It can make your baby sick after birth. If you test positive, you will get antibiotics during labor. The medicine will keep your baby from getting the bacteria. Follow-up care is a key part of your treatment and safety. Be sure to make and go to all appointments, and call your doctor if you are having problems. It's also a good idea to know your test results and keep a list of the medicines you take. How can you care for yourself at home? Learn about pain relief choices  · Pain is different for every woman. Talk with your doctor about your feelings about pain. · You can choose from several types of pain relief. These include medicine or breathing techniques, as well as comfort measures. You can use more than one option. · If you choose to have pain medicine during labor, talk to your doctor about your options.  Some medicines lower anxiety and help with some of the pain. Others make your lower body numb so that you won't feel pain. · Be sure to tell your doctor about your pain medicine choice before you start labor or very early in your labor. You may be able to change your mind as labor progresses. · Rarely, a woman is put to sleep by medicine given through a mask or an IV. Labor and delivery  · The first stage of labor has three parts: early, active, and transition. ? Most women have early labor at home. You can stay busy or rest, eat light snacks, drink clear fluids, and start counting contractions. ? When talking during a contraction gets hard, you may be moving to active labor. During active labor, you should head for the hospital if you are not there already. ? You are in active labor when contractions come every 3 to 4 minutes and last about 60 seconds. Your cervix is opening more rapidly. ? If your water breaks, contractions will come faster and stronger. ? During transition, your cervix is stretching, and contractions are coming more rapidly. ? You may want to push, but your cervix might not be ready. Your doctor will tell you when to push. · The second stage starts when your cervix is completely opened and you are ready to push. ? Contractions are very strong to push the baby down the birth canal.  ? You will feel the urge to push. You may feel like you need to have a bowel movement. ? You may be coached to push with contractions. These contractions will be very strong, but you will not have them as often. You can get a little rest between contractions. ? You may be emotional and irritable. You may not be aware of what is going on around you.  ? One last push, and your baby is born. · The third stage is when a few more contractions push out the placenta. This may take 30 minutes or less. · The fourth stage is the welcome recovery. You may feel overwhelmed with emotions and exhausted but alert. This is a good time to start breastfeeding.   Where can

## 2020-04-01 NOTE — PROGRESS NOTES
Comprehensive Metabolic Panel    Bile Acids, Total    [place] FETAL NON-STRESS TEST       More than 50% of this 20 min visit was education and counseling.

## 2020-04-03 LAB — BILE ACIDS TOTAL: 7 UMOL/L (ref 0–10)

## 2020-04-08 ENCOUNTER — HOSPITAL ENCOUNTER (OUTPATIENT)
Dept: ULTRASOUND IMAGING | Age: 25
Discharge: HOME | End: 2020-04-08
Payer: COMMERCIAL

## 2020-04-08 ENCOUNTER — ROUTINE PRENATAL (OUTPATIENT)
Dept: OBGYN | Age: 25
End: 2020-04-08
Payer: COMMERCIAL

## 2020-04-08 ENCOUNTER — HOSPITAL ENCOUNTER (OUTPATIENT)
Age: 25
Discharge: HOME | End: 2020-04-08
Attending: ADVANCED PRACTICE MIDWIFE | Admitting: ADVANCED PRACTICE MIDWIFE
Payer: COMMERCIAL

## 2020-04-08 VITALS
TEMPERATURE: 98.3 F | WEIGHT: 148 LBS | HEART RATE: 101 BPM | DIASTOLIC BLOOD PRESSURE: 66 MMHG | RESPIRATION RATE: 14 BRPM | SYSTOLIC BLOOD PRESSURE: 99 MMHG | BODY MASS INDEX: 29.84 KG/M2 | HEIGHT: 59 IN

## 2020-04-08 VITALS
BODY MASS INDEX: 29.39 KG/M2 | WEIGHT: 148 LBS | SYSTOLIC BLOOD PRESSURE: 141 MMHG | HEART RATE: 124 BPM | DIASTOLIC BLOOD PRESSURE: 83 MMHG

## 2020-04-08 DIAGNOSIS — Z98.891 PREVIOUS CESAREAN SECTION: ICD-10-CM

## 2020-04-08 DIAGNOSIS — R30.0 DYSURIA: Primary | ICD-10-CM

## 2020-04-08 DIAGNOSIS — G90.A POSTURAL ORTHOSTATIC TACHYCARDIA SYNDROME: ICD-10-CM

## 2020-04-08 DIAGNOSIS — Q79.60 EHLERS-DANLOS SYNDROME: ICD-10-CM

## 2020-04-08 DIAGNOSIS — O09.93 HIGH-RISK PREGNANCY IN THIRD TRIMESTER: ICD-10-CM

## 2020-04-08 LAB
ALBUMIN SERPL-MCNC: 3.7 G/DL (ref 3.5–5.2)
ALP BLD-CCNC: 137 U/L (ref 35–104)
ALT SERPL-CCNC: 7 U/L (ref 5–33)
ANION GAP SERPL CALCULATED.3IONS-SCNC: 13 MMOL/L (ref 7–19)
AST SERPL-CCNC: 13 U/L (ref 5–32)
BASOPHILS ABSOLUTE: 0 K/UL (ref 0–0.2)
BASOPHILS RELATIVE PERCENT: 0.3 % (ref 0–1)
BILIRUB SERPL-MCNC: <0.2 MG/DL (ref 0.2–1.2)
BILIRUBIN, POC: NEGATIVE
BLOOD URINE, POC: NEGATIVE
BUN BLDV-MCNC: 3 MG/DL (ref 6–20)
CALCIUM SERPL-MCNC: 8.3 MG/DL (ref 8.6–10)
CHLORIDE BLD-SCNC: 103 MMOL/L (ref 98–111)
CLARITY, POC: NORMAL
CO2: 19 MMOL/L (ref 22–29)
COLOR, POC: NORMAL
CREAT SERPL-MCNC: <0.5 MG/DL (ref 0.5–0.9)
EOSINOPHILS ABSOLUTE: 0.1 K/UL (ref 0–0.6)
EOSINOPHILS RELATIVE PERCENT: 0.4 % (ref 0–5)
GFR NON-AFRICAN AMERICAN: >60
GLUCOSE BLD-MCNC: 113 MG/DL (ref 74–109)
GLUCOSE URINE, POC: NEGATIVE
HCT VFR BLD CALC: 35.7 % (ref 37–47)
HEMOGLOBIN: 12 G/DL (ref 12–16)
IMMATURE GRANULOCYTES #: 0.3 K/UL
KETONES, POC: NEGATIVE
LEUKOCYTE EST, POC: NEGATIVE
LYMPHOCYTES ABSOLUTE: 1.6 K/UL (ref 1.1–4.5)
LYMPHOCYTES RELATIVE PERCENT: 13.5 % (ref 20–40)
MCH RBC QN AUTO: 32.7 PG (ref 27–31)
MCHC RBC AUTO-ENTMCNC: 33.6 G/DL (ref 33–37)
MCV RBC AUTO: 97.3 FL (ref 81–99)
MONOCYTES ABSOLUTE: 0.8 K/UL (ref 0–0.9)
MONOCYTES RELATIVE PERCENT: 6.5 % (ref 0–10)
NEUTROPHILS ABSOLUTE: 8.9 K/UL (ref 1.5–7.5)
NEUTROPHILS RELATIVE PERCENT: 77 % (ref 50–65)
NITRITE, POC: NEGATIVE
PDW BLD-RTO: 13.7 % (ref 11.5–14.5)
PH, POC: 7
PLATELET # BLD: 151 K/UL (ref 130–400)
PMV BLD AUTO: 10.6 FL (ref 9.4–12.3)
POTASSIUM SERPL-SCNC: 3.3 MMOL/L (ref 3.5–5)
PROTEIN, POC: NEGATIVE
RBC # BLD: 3.67 M/UL (ref 4.2–5.4)
SODIUM BLD-SCNC: 135 MMOL/L (ref 136–145)
SPECIFIC GRAVITY, POC: 1.02
TOTAL PROTEIN: 6.1 G/DL (ref 6.6–8.7)
UROBILINOGEN, POC: 0.2
WBC # BLD: 11.5 K/UL (ref 4.8–10.8)

## 2020-04-08 PROCEDURE — 99211 OFF/OP EST MAY X REQ PHY/QHP: CPT

## 2020-04-08 PROCEDURE — 76819 FETAL BIOPHYS PROFIL W/O NST: CPT

## 2020-04-08 PROCEDURE — 6360000002 HC RX W HCPCS: Performed by: ADVANCED PRACTICE MIDWIFE

## 2020-04-08 PROCEDURE — 80053 COMPREHEN METABOLIC PANEL: CPT

## 2020-04-08 PROCEDURE — 76815 OB US LIMITED FETUS(S): CPT

## 2020-04-08 PROCEDURE — 36415 COLL VENOUS BLD VENIPUNCTURE: CPT

## 2020-04-08 PROCEDURE — 82239 BILE ACIDS TOTAL: CPT

## 2020-04-08 PROCEDURE — 59025 FETAL NON-STRESS TEST: CPT

## 2020-04-08 PROCEDURE — 81003 URINALYSIS AUTO W/O SCOPE: CPT | Performed by: ADVANCED PRACTICE MIDWIFE

## 2020-04-08 PROCEDURE — 85025 COMPLETE CBC W/AUTO DIFF WBC: CPT

## 2020-04-08 PROCEDURE — 0502F SUBSEQUENT PRENATAL CARE: CPT | Performed by: ADVANCED PRACTICE MIDWIFE

## 2020-04-08 RX ORDER — BETAMETHASONE SODIUM PHOSPHATE AND BETAMETHASONE ACETATE 3; 3 MG/ML; MG/ML
12 INJECTION, SUSPENSION INTRA-ARTICULAR; INTRALESIONAL; INTRAMUSCULAR; SOFT TISSUE EVERY 24 HOURS
Status: DISCONTINUED | OUTPATIENT
Start: 2020-04-08 | End: 2020-04-08 | Stop reason: HOSPADM

## 2020-04-08 RX ORDER — OMEPRAZOLE 10 MG/1
10 CAPSULE, DELAYED RELEASE ORAL DAILY
COMMUNITY
End: 2020-11-13 | Stop reason: ALTCHOICE

## 2020-04-08 RX ADMIN — Medication 12 MG: at 13:28

## 2020-04-08 NOTE — PATIENT INSTRUCTIONS
Patient Education        Counting Your Baby's Kicks: Care Instructions  Your Care Instructions    Counting your baby's kicks is one way your doctor can tell that your baby is healthy. Most women--especially in a first pregnancy--feel their baby move for the first time between 16 and 22 weeks. The movement may feel like flutters rather than kicks. Your baby may move more at certain times of the day. When you are active, you may notice less kicking than when you are resting. At your prenatal visits, your doctor will ask whether the baby is active. In your last trimester, your doctor may ask you to count the number of times you feel your baby move. Follow-up care is a key part of your treatment and safety. Be sure to make and go to all appointments, and call your doctor if you are having problems. It's also a good idea to know your test results and keep a list of the medicines you take. How do you count fetal kicks? · A common method of checking your baby's movement is to count the number of kicks or moves you feel in 1 hour. Ten movements (such as kicks, flutters, or rolls) in 1 hour are normal. Some doctors suggest that you count in the morning until you get to 10 movements. Then you can quit for that day and start again the next day. · Pick your baby's most active time of day to count. This may be any time from morning to evening. · If you do not feel 10 movements in an hour, your baby may be sleeping. Wait for the next hour and count again. When should you call for help? Call your doctor now or seek immediate medical care if:    · You noticed that your baby has stopped moving or is moving much less than normal.    Watch closely for changes in your health, and be sure to contact your doctor if you have any problems. Where can you learn more? Go to https://chevi.[employer]. org and sign in to your [employer] account.  Enter T187 in the [employer] box to learn more about \"Counting Your Baby's Kicks: Care Instructions. \"     If you do not have an account, please click on the \"Sign Up Now\" link. Current as of: May 29, 2019Content Version: 12.4  © 5619-8925 Healthwise, Incorporated. Care instructions adapted under license by [facility] ([facility]). If you have questions about a medical condition or this instruction, always ask your healthcare professional. [address] any warranty or liability for your use of this information.

## 2020-04-09 ENCOUNTER — HOSPITAL ENCOUNTER (OUTPATIENT)
Age: 25
Discharge: HOME | End: 2020-04-09
Attending: ADVANCED PRACTICE MIDWIFE | Admitting: ADVANCED PRACTICE MIDWIFE
Payer: COMMERCIAL

## 2020-04-09 VITALS
DIASTOLIC BLOOD PRESSURE: 80 MMHG | TEMPERATURE: 98.1 F | SYSTOLIC BLOOD PRESSURE: 123 MMHG | RESPIRATION RATE: 16 BRPM | HEART RATE: 144 BPM

## 2020-04-09 PROBLEM — Z36.89 NST (NON-STRESS TEST) REACTIVE: Status: ACTIVE | Noted: 2020-04-09

## 2020-04-09 PROCEDURE — 96372 THER/PROPH/DIAG INJ SC/IM: CPT

## 2020-04-09 PROCEDURE — 6360000002 HC RX W HCPCS: Performed by: ADVANCED PRACTICE MIDWIFE

## 2020-04-09 PROCEDURE — 59025 FETAL NON-STRESS TEST: CPT

## 2020-04-09 PROCEDURE — 99211 OFF/OP EST MAY X REQ PHY/QHP: CPT

## 2020-04-09 RX ORDER — BETAMETHASONE SODIUM PHOSPHATE AND BETAMETHASONE ACETATE 3; 3 MG/ML; MG/ML
12 INJECTION, SUSPENSION INTRA-ARTICULAR; INTRALESIONAL; INTRAMUSCULAR; SOFT TISSUE ONCE
Status: COMPLETED | OUTPATIENT
Start: 2020-04-09 | End: 2020-04-09

## 2020-04-09 RX ADMIN — Medication 12 MG: at 15:37

## 2020-04-10 LAB — BILE ACIDS TOTAL: 3 UMOL/L (ref 0–10)

## 2020-04-11 LAB — GROUP B STREP CULTURE: NORMAL

## 2020-04-15 ENCOUNTER — ROUTINE PRENATAL (OUTPATIENT)
Dept: OBGYN | Age: 25
End: 2020-04-15
Payer: COMMERCIAL

## 2020-04-15 VITALS
WEIGHT: 150 LBS | SYSTOLIC BLOOD PRESSURE: 135 MMHG | DIASTOLIC BLOOD PRESSURE: 83 MMHG | HEART RATE: 132 BPM | BODY MASS INDEX: 30.3 KG/M2

## 2020-04-15 LAB
ALBUMIN SERPL-MCNC: 3.8 G/DL (ref 3.5–5.2)
ALP BLD-CCNC: 184 U/L (ref 35–104)
ALT SERPL-CCNC: 6 U/L (ref 5–33)
ANION GAP SERPL CALCULATED.3IONS-SCNC: 12 MMOL/L (ref 7–19)
AST SERPL-CCNC: 13 U/L (ref 5–32)
BILIRUB SERPL-MCNC: <0.2 MG/DL (ref 0.2–1.2)
BUN BLDV-MCNC: 5 MG/DL (ref 6–20)
CALCIUM SERPL-MCNC: 8.8 MG/DL (ref 8.6–10)
CHLORIDE BLD-SCNC: 103 MMOL/L (ref 98–111)
CO2: 23 MMOL/L (ref 22–29)
CREAT SERPL-MCNC: <0.5 MG/DL (ref 0.5–0.9)
GFR NON-AFRICAN AMERICAN: >60
GLUCOSE BLD-MCNC: 61 MG/DL (ref 74–109)
POTASSIUM SERPL-SCNC: 3.4 MMOL/L (ref 3.5–5)
SODIUM BLD-SCNC: 138 MMOL/L (ref 136–145)
TOTAL PROTEIN: 6.7 G/DL (ref 6.6–8.7)

## 2020-04-15 PROCEDURE — 0502F SUBSEQUENT PRENATAL CARE: CPT | Performed by: ADVANCED PRACTICE MIDWIFE

## 2020-04-15 PROCEDURE — 59025 FETAL NON-STRESS TEST: CPT | Performed by: ADVANCED PRACTICE MIDWIFE

## 2020-04-15 NOTE — PATIENT INSTRUCTIONS
the pain. Others make your lower body numb so that you won't feel pain. · Be sure to tell your doctor about your pain medicine choice before you start labor or very early in your labor. You may be able to change your mind as labor progresses. · Rarely, a woman is put to sleep by medicine given through a mask or an IV. Labor and delivery  · The first stage of labor has three parts: early, active, and transition. ? Most women have early labor at home. You can stay busy or rest, eat light snacks, drink clear fluids, and start counting contractions. ? When talking during a contraction gets hard, you may be moving to active labor. During active labor, you should head for the hospital if you are not there already. ? You are in active labor when contractions come every 3 to 4 minutes and last about 60 seconds. Your cervix is opening more rapidly. ? If your water breaks, contractions will come faster and stronger. ? During transition, your cervix is stretching, and contractions are coming more rapidly. ? You may want to push, but your cervix might not be ready. Your doctor will tell you when to push. · The second stage starts when your cervix is completely opened and you are ready to push. ? Contractions are very strong to push the baby down the birth canal.  ? You will feel the urge to push. You may feel like you need to have a bowel movement. ? You may be coached to push with contractions. These contractions will be very strong, but you will not have them as often. You can get a little rest between contractions. ? You may be emotional and irritable. You may not be aware of what is going on around you.  ? One last push, and your baby is born. · The third stage is when a few more contractions push out the placenta. This may take 30 minutes or less. · The fourth stage is the welcome recovery. You may feel overwhelmed with emotions and exhausted but alert. This is a good time to start breastfeeding.   Where can you learn more? Go to https://chpepiceweb.[email or web address] org and sign in to your Moven account. Enter X370 in the VersionOne box to learn more about \"Weeks 34 to 36 of Your Pregnancy: Care Instructions. \"     If you do not have an account, please click on the \"Sign Up Now\" link. Current as of: May 29, 2019Content Version: 12.4  © 6438-7471 Healthwise, Incorporated. Care instructions adapted under license by [facility] ([facility]). If you have questions about a medical condition or this instruction, always ask your healthcare professional. [place] [address] any warranty or liability for your use of this information. Patient Education        Week 37 of Your Pregnancy: Care Instructions  Your Care Instructions    You are near the end of your pregnancy--and you're probably pretty uncomfortable. It may be harder to walk around. Lying down probably isn't comfortable either. You may have trouble getting to sleep or staying asleep. Most women deliver their babies between 40 and 41 weeks. This is a good time to think about packing a bag for the hospital with items you'll need. Then you'll be ready when labor starts. Follow-up care is a key part of your treatment and safety. Be sure to make and go to all appointments, and call your doctor if you are having problems. It's also a good idea to know your test results and keep a list of the medicines you take. How can you care for yourself at home? Learn about breastfeeding  · Breastfeeding is best for your baby and good for you. · Breast milk has antibodies to help your baby fight infections. · Mothers who breastfeed often lose weight faster, because making milk burns calories. · Learning the best ways to hold your baby will make breastfeeding easier. · Let your partner bathe and diaper the baby to keep your partner from feeling left out. Snuggle together when you breastfeed.   · You may want to learn how to use a breast pump and store your milk. · If you choose to bottle feed, make the feeding feel like breastfeeding so you can bond with your baby. Always hold your baby and the bottle. Do not prop bottles or let your baby fall asleep with a bottle. Learn about crying  · It is common for babies to cry for 1 to 3 hours a day. Some cry more, some cry less. · Babies don't cry to make you upset or because you are a bad parent. · Crying is how your baby communicates. Your baby may be hungry; have gas; need a diaper change; or feel cold, warm, tired, lonely, or tense. Sometimes babies cry for unknown reasons. · If you respond to your baby's needs, he or she will learn to trust you. · Try to stay calm when your baby cries. Your baby may get more upset if he or she senses that you are upset. Know how to care for your   · Your baby's umbilical cord stump will drop off on its own, usually between 1 and 2 weeks. To care for your baby's umbilical cord area:  ? Clean the area at the bottom of the cord 2 or 3 times a day. ? Pay special attention to the area where the cord attaches to the skin. ? Keep the diaper folded below the cord. ? Use a damp washcloth or cotton ball to sponge bathe your baby until the stump has come off. · Your baby's first dark stool is called meconium. After the meconium is passed, your baby will develop his or her own bowel pattern. ? Some babies, especially  babies, have several bowel movements a day. Others have one or two a day, or one every 2 to 3 days. ?  babies often have loose, yellow stools. Formula-fed babies have more formed stools. ? If your baby's stools look like little pellets, he or she is constipated. After 2 days of constipation, call your baby's doctor. · If your baby will be circumcised, you can care for him at home. ? Gently rinse his penis with warm water after every diaper change. Do not try to remove the film that forms on the penis. This film will go away on its own.  [place] dry.  ? Put petroleum ointment, such as Vaseline, on the area of the diaper that will touch your baby's penis. This will keep the diaper from sticking to your baby. ? Ask the doctor about giving your baby acetaminophen (Tylenol) for pain. Where can you learn more? Go to https://chpepicewdiego.healthNicePeopleAtWork. org and sign in to your Bonush account. Enter [phone] [id number] in the CytoViva box to learn more about \"Week 37 of Your Pregnancy: Care Instructions. \"     If you do not have an account, please click on the \"Sign Up Now\" link. Current as of: May 29, 2019Content Version: 12.4  © 0952-1567 Healthwise, Incorporated. Care instructions adapted under license by [facility] [address] If you have questions about a medical condition or this instruction, always ask your healthcare professional. [place] [address] any warranty or liability for your use of this information.

## 2020-04-16 ENCOUNTER — TELEPHONE (OUTPATIENT)
Dept: OBGYN | Age: 25
End: 2020-04-16

## 2020-04-16 NOTE — TELEPHONE ENCOUNTER
Pt had exam yesterday and had some bright red bleeding today. Pt to watch for signs of contractions, hard abdomen, decrease fetal movement or increased blood. If any of those happen, she is to go to L&D. Pt verbalized understanding.

## 2020-04-17 LAB — BILE ACIDS TOTAL: 4 UMOL/L (ref 0–10)

## 2020-04-20 ENCOUNTER — HOSPITAL ENCOUNTER (OUTPATIENT)
Dept: ULTRASOUND IMAGING | Age: 25
Discharge: HOME | End: 2020-04-20
Payer: COMMERCIAL

## 2020-04-20 ENCOUNTER — ROUTINE PRENATAL (OUTPATIENT)
Dept: OBGYN | Age: 25
End: 2020-04-20

## 2020-04-20 VITALS
DIASTOLIC BLOOD PRESSURE: 81 MMHG | WEIGHT: 153 LBS | BODY MASS INDEX: 30.9 KG/M2 | HEART RATE: 139 BPM | SYSTOLIC BLOOD PRESSURE: 127 MMHG

## 2020-04-20 DIAGNOSIS — O34.211 MATERNAL CARE DUE TO LOW TRANSVERSE UTERINE SCAR FROM PREVIOUS CESAREAN DELIVERY: ICD-10-CM

## 2020-04-20 DIAGNOSIS — Z98.891 HISTORY OF VBAC: ICD-10-CM

## 2020-04-20 DIAGNOSIS — G90.A POTS (POSTURAL ORTHOSTATIC TACHYCARDIA SYNDROME): ICD-10-CM

## 2020-04-20 LAB
ALBUMIN SERPL-MCNC: 3.8 G/DL (ref 3.5–5.2)
ALP BLD-CCNC: 158 U/L (ref 35–104)
ALT SERPL-CCNC: 7 U/L (ref 5–33)
ANION GAP SERPL CALCULATED.3IONS-SCNC: 16 MMOL/L (ref 7–19)
AST SERPL-CCNC: 12 U/L (ref 5–32)
BILIRUB SERPL-MCNC: 0.3 MG/DL (ref 0.2–1.2)
BUN BLDV-MCNC: 6 MG/DL (ref 6–20)
CALCIUM SERPL-MCNC: 8.6 MG/DL (ref 8.6–10)
CHLORIDE BLD-SCNC: 103 MMOL/L (ref 98–111)
CO2: 21 MMOL/L (ref 22–29)
CREAT SERPL-MCNC: <0.5 MG/DL (ref 0.5–0.9)
GFR NON-AFRICAN AMERICAN: >60
GLUCOSE BLD-MCNC: 79 MG/DL (ref 74–109)
HCT VFR BLD CALC: 36.6 % (ref 37–47)
HEMOGLOBIN: 12.3 G/DL (ref 12–16)
MCH RBC QN AUTO: 32.8 PG (ref 27–31)
MCHC RBC AUTO-ENTMCNC: 33.6 G/DL (ref 33–37)
MCV RBC AUTO: 97.6 FL (ref 81–99)
PDW BLD-RTO: 13.7 % (ref 11.5–14.5)
PLATELET # BLD: 132 K/UL (ref 130–400)
PMV BLD AUTO: 11.4 FL (ref 9.4–12.3)
POTASSIUM SERPL-SCNC: 3.9 MMOL/L (ref 3.5–5)
RBC # BLD: 3.75 M/UL (ref 4.2–5.4)
SODIUM BLD-SCNC: 140 MMOL/L (ref 136–145)
TOTAL PROTEIN: 5.9 G/DL (ref 6.6–8.7)
URIC ACID, SERUM: 3.8 MG/DL (ref 2.4–5.7)
WBC # BLD: 12.9 K/UL (ref 4.8–10.8)

## 2020-04-20 PROCEDURE — 0502F SUBSEQUENT PRENATAL CARE: CPT | Performed by: ADVANCED PRACTICE MIDWIFE

## 2020-04-20 PROCEDURE — 76819 FETAL BIOPHYS PROFIL W/O NST: CPT

## 2020-04-20 NOTE — PROGRESS NOTES
CNM Prenatal Office Note  Subjective:  Bren Garcia is here for a return obstetrical visit. Today she is 37w2d weeks EGA. She is doing well, taking her PNV as directed, and has no complaints. She  does not have vaginal bleeding, n/v, but headaches and dizziness continue. Pt does feel fetal movement regularly. Objective: Mother's Prenatal Vitals  BP: 127/81  Weight: 153 lb (69.4 kg)  Pulse: 139  Patient Position: Sitting  Prenatal Fetal Information  Fundal Height (cm): 38 cm  Fetal Heart Rate:   Movement: Present  Cervical Exam  Dilation (cm): 3  Effacement (%): 70  Station: -3  Station (Labor Curve Graph): 8  Presentation: Vertex  Dil/Eff/Sta  Dilation (cm): 3  Effacement (%): 70  Station: -3  Pt is A&Ox3, in no acute distress. Normocephalic, atraumatic. PERRL. Resp even and non-labored. Skin pink, warm & dry. Gravid abdomen. DAVIS's well. Gait steady. Assessment:    IUP at 37w2d wks      Diagnosis Orders   1. POTS (postural orthostatic tachycardia syndrome)  US OB 1 or More Fetus Limited    US FETAL BIOPHYSICAL PROFILE WO NON STRESS TESTING    Comprehensive Metabolic Panel    CBC    Uric Acid   2. Andreas-Danlos disease     3.  (vaginal birth after )     4. History of   US OB 1 or More Fetus Limited    Comprehensive Metabolic Panel    CBC    Uric Acid   5. Maternal care due to low transverse uterine scar from previous  delivery  US OB 1 or More Fetus Limited    Comprehensive Metabolic Panel    CBC    Uric Acid     Plan:   Pt counseled on balanced nutrition, adequate fluid intake, taking PNV daily, and exercise along with labor precautions, GHTN precautions and Kick count   Continue with routine prenatal care.     surveillance indicated for POTS, anemia, Andreas-Danlos Syndrome   RTC in 4 days for prenatal visit, continue twice weekly until delivery   Recheck labs today   Requested interval growth percentages on u/s    MEDICATIONS:  No orders of the defined types were placed in this encounter. ORDERS:  Orders Placed This Encounter   Procedures    US OB 1 or More Fetus Limited    US FETAL BIOPHYSICAL PROFILE WO NON STRESS TESTING    Comprehensive Metabolic Panel    CBC    Uric Acid       More than 50% of this 20 min visit was education and counseling.

## 2020-04-20 NOTE — PATIENT INSTRUCTIONS
Patient Education        Week 37 of Your Pregnancy: Care Instructions  Your Care Instructions    You are near the end of your pregnancy--and you're probably pretty uncomfortable. It may be harder to walk around. Lying down probably isn't comfortable either. You may have trouble getting to sleep or staying asleep. Most women deliver their babies between 40 and 41 weeks. This is a good time to think about packing a bag for the hospital with items you'll need. Then you'll be ready when labor starts. Follow-up care is a key part of your treatment and safety. Be sure to make and go to all appointments, and call your doctor if you are having problems. It's also a good idea to know your test results and keep a list of the medicines you take. How can you care for yourself at home? Learn about breastfeeding  · Breastfeeding is best for your baby and good for you. · Breast milk has antibodies to help your baby fight infections. · Mothers who breastfeed often lose weight faster, because making milk burns calories. · Learning the best ways to hold your baby will make breastfeeding easier. · Let your partner bathe and diaper the baby to keep your partner from feeling left out. Snuggle together when you breastfeed. · You may want to learn how to use a breast pump and store your milk. · If you choose to bottle feed, make the feeding feel like breastfeeding so you can bond with your baby. Always hold your baby and the bottle. Do not prop bottles or let your baby fall asleep with a bottle. Learn about crying  · It is common for babies to cry for 1 to 3 hours a day. Some cry more, some cry less. · Babies don't cry to make you upset or because you are a bad parent. · Crying is how your baby communicates. Your baby may be hungry; have gas; need a diaper change; or feel cold, warm, tired, lonely, or tense. Sometimes babies cry for unknown reasons.   · If you respond to your baby's needs, he or she will learn to trust you.  · Try to stay calm when your baby cries. Your baby may get more upset if he or she senses that you are upset. Know how to care for your   · Your baby's umbilical cord stump will drop off on its own, usually between 1 and 2 weeks. To care for your baby's umbilical cord area:  ? Clean the area at the bottom of the cord 2 or 3 times a day. ? Pay special attention to the area where the cord attaches to the skin. ? Keep the diaper folded below the cord. ? Use a damp washcloth or cotton ball to sponge bathe your baby until the stump has come off. · Your baby's first dark stool is called meconium. After the meconium is passed, your baby will develop his or her own bowel pattern. ? Some babies, especially  babies, have several bowel movements a day. Others have one or two a day, or one every 2 to 3 days. ?  babies often have loose, yellow stools. Formula-fed babies have more formed stools. ? If your baby's stools look like little pellets, he or she is constipated. After 2 days of constipation, call your baby's doctor. · If your baby will be circumcised, you can care for him at home. ? Gently rinse his penis with warm water after every diaper change. Do not try to remove the film that forms on the penis. This film will go away on its own. Pat dry. ? Put petroleum ointment, such as Vaseline, on the area of the diaper that will touch your baby's penis. This will keep the diaper from sticking to your baby. ? Ask the doctor about giving your baby acetaminophen (Tylenol) for pain. Where can you learn more? Go to https://chdenisaeb.healthVarsity News Network. org and sign in to your [employer] account. Enter [phone] [id number] in the Skyword box to learn more about \"Week 37 of Your Pregnancy: Care Instructions. \"     If you do not have an account, please click on the \"Sign Up Now\" link. Current as of: May 29, 2019Content Version: 12.4  © 0047-8501 Healthwise, Incorporated.   Care instructions adapted under license by [facility] ([facility]). If you have questions about a medical condition or this instruction, always ask your healthcare professional. [address] any warranty or liability for your use of this information.

## 2020-04-22 LAB — BILE ACIDS TOTAL: 2 UMOL/L (ref 0–10)

## 2020-04-23 ENCOUNTER — ANESTHESIA EVENT (OUTPATIENT)
Dept: LABOR AND DELIVERY | Age: 25
End: 2020-04-23
Payer: COMMERCIAL

## 2020-04-23 ENCOUNTER — HOSPITAL ENCOUNTER (INPATIENT)
Age: 25
LOS: 1 days | Discharge: HOME | End: 2020-04-24
Attending: ADVANCED PRACTICE MIDWIFE | Admitting: ADVANCED PRACTICE MIDWIFE
Payer: COMMERCIAL

## 2020-04-23 ENCOUNTER — ANESTHESIA (OUTPATIENT)
Dept: LABOR AND DELIVERY | Age: 25
End: 2020-04-23
Payer: COMMERCIAL

## 2020-04-23 PROBLEM — Z3A.37 37 WEEKS GESTATION OF PREGNANCY: Status: ACTIVE | Noted: 2020-04-23

## 2020-04-23 LAB
ABO/RH: NORMAL
AMPHETAMINE SCREEN, URINE: NEGATIVE
ANTIBODY SCREEN: NORMAL
BARBITURATE SCREEN URINE: NEGATIVE
BASOPHILS ABSOLUTE: 0.1 K/UL (ref 0–0.2)
BASOPHILS RELATIVE PERCENT: 0.4 % (ref 0–1)
BENZODIAZEPINE SCREEN, URINE: NEGATIVE
CANNABINOID SCREEN URINE: NEGATIVE
COCAINE METABOLITE SCREEN URINE: NEGATIVE
EOSINOPHILS ABSOLUTE: 0.1 K/UL (ref 0–0.6)
EOSINOPHILS RELATIVE PERCENT: 0.4 % (ref 0–5)
HCT VFR BLD CALC: 36.7 % (ref 37–47)
HEMOGLOBIN: 12.5 G/DL (ref 12–16)
IMMATURE GRANULOCYTES #: 0.2 K/UL
LYMPHOCYTES ABSOLUTE: 1.6 K/UL (ref 1.1–4.5)
LYMPHOCYTES RELATIVE PERCENT: 13.1 % (ref 20–40)
Lab: NORMAL
MCH RBC QN AUTO: 32.9 PG (ref 27–31)
MCHC RBC AUTO-ENTMCNC: 34.1 G/DL (ref 33–37)
MCV RBC AUTO: 96.6 FL (ref 81–99)
MONOCYTES ABSOLUTE: 1 K/UL (ref 0–0.9)
MONOCYTES RELATIVE PERCENT: 8.4 % (ref 0–10)
NEUTROPHILS ABSOLUTE: 9.4 K/UL (ref 1.5–7.5)
NEUTROPHILS RELATIVE PERCENT: 76.1 % (ref 50–65)
OPIATE SCREEN URINE: NEGATIVE
PDW BLD-RTO: 13.4 % (ref 11.5–14.5)
PLATELET # BLD: 127 K/UL (ref 130–400)
PMV BLD AUTO: 10.7 FL (ref 9.4–12.3)
RBC # BLD: 3.8 M/UL (ref 4.2–5.4)
RPR: NORMAL
WBC # BLD: 12.3 K/UL (ref 4.8–10.8)

## 2020-04-23 PROCEDURE — 80307 DRUG TEST PRSMV CHEM ANLYZR: CPT

## 2020-04-23 PROCEDURE — 1220000000 HC SEMI PRIVATE OB R&B

## 2020-04-23 PROCEDURE — 86592 SYPHILIS TEST NON-TREP QUAL: CPT

## 2020-04-23 PROCEDURE — 85025 COMPLETE CBC W/AUTO DIFF WBC: CPT

## 2020-04-23 PROCEDURE — 7200000001 HC VAGINAL DELIVERY

## 2020-04-23 PROCEDURE — 6360000002 HC RX W HCPCS: Performed by: ADVANCED PRACTICE MIDWIFE

## 2020-04-23 PROCEDURE — 36415 COLL VENOUS BLD VENIPUNCTURE: CPT

## 2020-04-23 PROCEDURE — 86900 BLOOD TYPING SEROLOGIC ABO: CPT

## 2020-04-23 PROCEDURE — 6370000000 HC RX 637 (ALT 250 FOR IP): Performed by: ADVANCED PRACTICE MIDWIFE

## 2020-04-23 PROCEDURE — 86901 BLOOD TYPING SEROLOGIC RH(D): CPT

## 2020-04-23 PROCEDURE — 86850 RBC ANTIBODY SCREEN: CPT

## 2020-04-23 PROCEDURE — 2580000003 HC RX 258: Performed by: ADVANCED PRACTICE MIDWIFE

## 2020-04-23 RX ORDER — LANOLIN 100 %
OINTMENT (GRAM) TOPICAL PRN
Status: DISCONTINUED | OUTPATIENT
Start: 2020-04-23 | End: 2020-04-24 | Stop reason: HOSPADM

## 2020-04-23 RX ORDER — IBUPROFEN 400 MG/1
800 TABLET ORAL EVERY 8 HOURS PRN
Status: DISCONTINUED | OUTPATIENT
Start: 2020-04-23 | End: 2020-04-24 | Stop reason: HOSPADM

## 2020-04-23 RX ORDER — CARBOPROST TROMETHAMINE 250 UG/ML
250 INJECTION, SOLUTION INTRAMUSCULAR
Status: ACTIVE | OUTPATIENT
Start: 2020-04-23 | End: 2020-04-23

## 2020-04-23 RX ORDER — ONDANSETRON 2 MG/ML
8 INJECTION INTRAMUSCULAR; INTRAVENOUS EVERY 8 HOURS PRN
Status: DISCONTINUED | OUTPATIENT
Start: 2020-04-23 | End: 2020-04-24 | Stop reason: HOSPADM

## 2020-04-23 RX ORDER — SODIUM CHLORIDE 0.9 % (FLUSH) 0.9 %
10 SYRINGE (ML) INJECTION EVERY 12 HOURS SCHEDULED
Status: DISCONTINUED | OUTPATIENT
Start: 2020-04-23 | End: 2020-04-24 | Stop reason: HOSPADM

## 2020-04-23 RX ORDER — DOCUSATE SODIUM 100 MG/1
100 CAPSULE, LIQUID FILLED ORAL 2 TIMES DAILY
Status: DISCONTINUED | OUTPATIENT
Start: 2020-04-23 | End: 2020-04-24 | Stop reason: HOSPADM

## 2020-04-23 RX ORDER — LIDOCAINE HYDROCHLORIDE 10 MG/ML
30 INJECTION, SOLUTION EPIDURAL; INFILTRATION; INTRACAUDAL; PERINEURAL PRN
Status: DISCONTINUED | OUTPATIENT
Start: 2020-04-23 | End: 2020-04-24 | Stop reason: HOSPADM

## 2020-04-23 RX ORDER — PRENATAL WITH FERROUS FUM AND FOLIC ACID 3080; 920; 120; 400; 22; 1.84; 3; 20; 10; 1; 12; 200; 27; 25; 2 [IU]/1; [IU]/1; MG/1; [IU]/1; MG/1; MG/1; MG/1; MG/1; MG/1; MG/1; UG/1; MG/1; MG/1; MG/1; MG/1
1 TABLET ORAL DAILY
Status: CANCELLED | OUTPATIENT
Start: 2020-04-23

## 2020-04-23 RX ORDER — METHYLERGONOVINE MALEATE 0.2 MG/ML
200 INJECTION INTRAVENOUS
Status: ACTIVE | OUTPATIENT
Start: 2020-04-23 | End: 2020-04-23

## 2020-04-23 RX ORDER — OXYCODONE HYDROCHLORIDE AND ACETAMINOPHEN 5; 325 MG/1; MG/1
2 TABLET ORAL EVERY 4 HOURS PRN
Status: DISCONTINUED | OUTPATIENT
Start: 2020-04-23 | End: 2020-04-23 | Stop reason: CLARIF

## 2020-04-23 RX ORDER — METHYLERGONOVINE MALEATE 0.2 MG/ML
200 INJECTION INTRAVENOUS PRN
Status: DISCONTINUED | OUTPATIENT
Start: 2020-04-23 | End: 2020-04-23

## 2020-04-23 RX ORDER — CARBOPROST TROMETHAMINE 250 UG/ML
250 INJECTION, SOLUTION INTRAMUSCULAR PRN
Status: DISCONTINUED | OUTPATIENT
Start: 2020-04-23 | End: 2020-04-23

## 2020-04-23 RX ORDER — SODIUM CHLORIDE, SODIUM LACTATE, POTASSIUM CHLORIDE, CALCIUM CHLORIDE 600; 310; 30; 20 MG/100ML; MG/100ML; MG/100ML; MG/100ML
INJECTION, SOLUTION INTRAVENOUS CONTINUOUS
Status: DISCONTINUED | OUTPATIENT
Start: 2020-04-23 | End: 2020-04-24 | Stop reason: HOSPADM

## 2020-04-23 RX ORDER — IBUPROFEN 400 MG/1
800 TABLET ORAL EVERY 8 HOURS
Status: DISCONTINUED | OUTPATIENT
Start: 2020-04-23 | End: 2020-04-24 | Stop reason: HOSPADM

## 2020-04-23 RX ORDER — DOCUSATE SODIUM 100 MG/1
100 CAPSULE, LIQUID FILLED ORAL 2 TIMES DAILY
Status: DISCONTINUED | OUTPATIENT
Start: 2020-04-23 | End: 2020-04-23

## 2020-04-23 RX ORDER — SODIUM CHLORIDE 0.9 % (FLUSH) 0.9 %
10 SYRINGE (ML) INJECTION PRN
Status: DISCONTINUED | OUTPATIENT
Start: 2020-04-23 | End: 2020-04-24 | Stop reason: HOSPADM

## 2020-04-23 RX ORDER — MISOPROSTOL 200 UG/1
800 TABLET ORAL PRN
Status: DISCONTINUED | OUTPATIENT
Start: 2020-04-23 | End: 2020-04-24 | Stop reason: HOSPADM

## 2020-04-23 RX ORDER — OXYCODONE HYDROCHLORIDE AND ACETAMINOPHEN 5; 325 MG/1; MG/1
1 TABLET ORAL EVERY 4 HOURS PRN
Status: DISCONTINUED | OUTPATIENT
Start: 2020-04-23 | End: 2020-04-23 | Stop reason: CLARIF

## 2020-04-23 RX ORDER — MISOPROSTOL 200 UG/1
800 TABLET ORAL PRN
Status: DISCONTINUED | OUTPATIENT
Start: 2020-04-23 | End: 2020-04-23

## 2020-04-23 RX ORDER — BISACODYL 10 MG
10 SUPPOSITORY, RECTAL RECTAL DAILY PRN
Status: DISCONTINUED | OUTPATIENT
Start: 2020-04-23 | End: 2020-04-24 | Stop reason: HOSPADM

## 2020-04-23 RX ADMIN — Medication 1 MILLI-UNITS/MIN: at 08:43

## 2020-04-23 RX ADMIN — SODIUM CHLORIDE, SODIUM LACTATE, POTASSIUM CHLORIDE, AND CALCIUM CHLORIDE: 600; 310; 30; 20 INJECTION, SOLUTION INTRAVENOUS at 08:31

## 2020-04-23 RX ADMIN — IBUPROFEN 800 MG: 400 TABLET ORAL at 23:28

## 2020-04-23 RX ADMIN — DOCUSATE SODIUM 100 MG: 100 CAPSULE, LIQUID FILLED ORAL at 21:09

## 2020-04-23 RX ADMIN — BUTORPHANOL TARTRATE 2 MG: 2 INJECTION, SOLUTION INTRAMUSCULAR; INTRAVENOUS at 17:32

## 2020-04-23 ASSESSMENT — PAIN SCALES - GENERAL
PAINLEVEL_OUTOF10: 10
PAINLEVEL_OUTOF10: 6

## 2020-04-23 NOTE — ANESTHESIA PRE PROCEDURE
ibuprofen (ADVIL;MOTRIN) tablet 800 mg  800 mg Oral Q8H PRN Julian [place], APRN - CNM        docusate sodium (COLACE) capsule 100 mg  100 mg Oral BID Julian [place], APRN - CNM        bisacodyl (DULCOLAX) suppository 10 mg  10 mg Rectal Daily PRN Julian [place], APRN - CNM        ondansetron [facility]) injection 8 mg  8 mg Intravenous Q8H PRN Julian [place], APRN - CNM        oxytocin (PITOCIN) 30 units in 500 mL infusion  1 akin-units/min Intravenous Continuous PRN Julian [place], APRN - CNM 6 mL/hr at 04/23/20 1025 6 akin-units/min at 04/23/20 1025    methylergonovine (METHERGINE) injection 200 mcg  200 mcg Intramuscular PRN Julian [place], APRN - CNM        carboprost (HEMABATE) injection 250 mcg  250 mcg Intramuscular PRN Julian [place], APRN - CNM        misoprostol (CYTOTEC) tablet 800 mcg  800 mcg Rectal PRN Julian [place], APRN - CNM        witch hazel-glycerin (TUCKS) pad   Topical PRN Julian [place], APRN - CNM        benzocaine-menthol (DERMOPLAST) 20-0.5 % spray   Topical PRN Julian [place], APRN - CNM           Allergies: Allergies   Allergen Reactions    Other      Other reaction(s):  Other (See Comments)  Band-Aids causes welps and rash    Percocet [Oxycodone-Acetaminophen] Rash    Prochlorperazine Rash       Problem List:    Patient Active Problem List   Diagnosis Code    GERD (gastroesophageal reflux disease) K21.9    Gastroparesis K31.84    Postural orthostatic tachycardia syndrome R00.0, I95.1    Andreas-Danlos syndrome Q79.60    Anemia D64.9    Low back pain M54.5    Headache R51    NST (non-stress test) reactive Z36.89    37 weeks gestation of pregnancy Z3A.37       Past Medical History:        Diagnosis Date    Anemia 2/19/2020    Anxiety     Anxiety disorder     Eczema     Andreas-Danlos syndrome 08/2017    Gastritis     Gastroparesis     GERD (gastroesophageal reflux disease)     History of chicken pox     IBS (irritable bowel syndrome)     Postural orthostatic tachycardia syndrome 2017    Stress incontinence 3/19/2019       Past Surgical History:        Procedure Laterality Date     SECTION      x 1    CHOLECYSTECTOMY      GALLBLADDER SURGERY  2014    OTHER SURGICAL HISTORY  2018        TONSILLECTOMY         Social History:    Social History     Tobacco Use    Smoking status: Never Smoker    Smokeless tobacco: Never Used   Substance Use Topics    Alcohol use: No                                Counseling given: Not Answered      Vital Signs (Current):   Vitals:    20 0632 20 0635   BP: 117/70    Pulse: 108    Weight:  153 lb (69.4 kg)                                              BP Readings from Last 3 Encounters:   20 117/70   20 127/81   04/15/20 135/83       NPO Status:                                                                                 BMI:   Wt Readings from Last 3 Encounters:   20 153 lb (69.4 kg)   20 153 lb (69.4 kg)   04/15/20 150 lb (68 kg)     Body mass index is 30.9 kg/m². CBC:   Lab Results   Component Value Date    WBC 12.3 2020    RBC 3.80 2020    HGB 12.5 2020    HCT 36.7 2020    MCV 96.6 2020    RDW 13.4 2020     2020       CMP:   Lab Results   Component Value Date     2020    K 3.9 2020     2020    CO2 21 2020    BUN 6 2020    CREATININE <0.5 2020    LABGLOM >60 2020    GLUCOSE 79 2020    PROT 5.9 2020    CALCIUM 8.6 2020    BILITOT 0.3 2020    ALKPHOS 158 2020    AST 12 2020    ALT 7 2020       POC Tests: No results for input(s): POCGLU, POCNA, POCK, POCCL, POCBUN, POCHEMO, POCHCT in the last 72 hours.     Coags: No results found for: PROTIME, INR, APTT    HCG (If Applicable):   Lab Results   Component Value Date    PREGTESTUR neg 2014        ABGs: No results found for: PHART, PO2ART, KMU6DHE, CDG1DCR, BEART,

## 2020-04-23 NOTE — H&P
[facility] History and Physical    Provider: VALE Morrell CNM  Date: 2020            9:33 AM    Patient Name: Rustam Hewitt  Patient : 1995  MRN: [id number]   Room/Bed: [facility]/0217-    SUBJECTIVE:    CHIEF COMPLAINT:  leakage of amniotic fluid  Chief Complaint   Patient presents with    Rupture of Membranes     ROM occurred at 0445       HISTORY OF PRESENT ILLNESS:      Rustam Hewitt a 27 y.o. female at 37w6d presents with a chief complaint as above and is being admitted for latent labor with SROM. Her pregnancy has been complicated by POTS, previous c/s, gastroparesis, anemia, and Andreas-Danlos syndrome. She has been followed by MFM and twice weekly testing. She has had weekly liver function and bile acids studies which have been WNL. Contractions: yes  Rupture of membranes: yes, clear fluid  Vaginal bleeding: no    REVIEW OF SYSTEMS:  A comprehensive review of systems was negative except for what was noted in the HPI. OBJECTIVE:     Estimated Due Date:   Estimated Date of Delivery: 20   Patient's last menstrual period was 2019.       PREGNANCY RISK FACTORS:       Patient Active Problem List   Diagnosis    GERD (gastroesophageal reflux disease)    Gastroparesis    Postural orthostatic tachycardia syndrome    Andreas-Danlos syndrome    Anemia    Low back pain    Headache    NST (non-stress test) reactive    37 weeks gestation of pregnancy        Steroids:  yes    PAST OB HISTORY:  OB History    Para Term  AB Living   3 2 2 0 0 1   SAB TAB Ectopic Molar Multiple Live Births   0 0 0 0 0 1      # Outcome Date GA Lbr Davie/2nd Weight Sex Delivery Anes PTL Lv   3 Current            2 Term 18 39w0d          1 Term 09/16/15 39w5d  7 lb 5 oz (3.317 kg) M CS-LTranv EPI Y HEIDI      Complications: Failure to progress in labor, Fetal tachycardia affecting care of mother, delivered      Name: Sarahi Campbell          Depression:  No Gets together: Not on file     Attends [employer] service: Not on file     Active member of club or organization: Not on file     Attends meetings of clubs or organizations: Not on file     Relationship status: Not on file    Intimate partner violence     Fear of current or ex partner: Not on file     Emotionally abused: Not on file     Physically abused: Not on file     Forced sexual activity: Not on file   Other Topics Concern    Not on file   Social History Narrative    Not on file       Family History:       Problem Relation Age of Onset    High Blood Pressure Father     Diabetes Father         Borderline    Heart Attack Father     Other Mother         High grade dysplasia on pap smear    Breast Cancer Mother     Diabetes Paternal Grandfather        Medications Prior to Admission:  Medications Prior to Admission: omeprazole (PRILOSEC) 10 MG delayed release capsule, Take 10 mg by mouth daily  Cholecalciferol (VITAMIN D) 50 MCG (2000 UT) CAPS capsule, Take by mouth Indications: 29402   Cyanocobalamin (VITAMIN B 12) 100 MCG LOZG, Take by mouth  Prenatal Vit-Fe Fumarate-FA (PRENATAL VITAMIN) 27-1 MG TABS tablet, Take 1 tablet by mouth daily  promethazine (PHENERGAN) 25 MG tablet, Take 25 mg by mouth  Ascorbic Acid (VITAMIN C) 250 MG tablet, Take 250 mg by mouth daily  doxylamine-pyridoxine 10-10 MG TBEC, Take 1 tablet by mouth 2 times daily  doxylamine-pyridoxine (DICLEGIS) 10-10 MG TBEC, 2 tabs po nighty. Can take 1 tab by in am and 1 tab in afternoon if needed    PHYSICAL EXAM:     Vitals:    04/23/20 0632   BP: 117/70   Pulse: 108       General appearance:  awake, alert, cooperative, no apparent distress, and appears stated age  Skin:  Warm, dry, no rashes or erythema  Neurologic:  Awake, alert, oriented to name, place and time. Cranial nerves II-XII are grossly intact. Motor is 5 out of 5 bilaterally.   Lungs:  No increased work of breathing, good air exchange, clear to auscultation bilaterally, no crackles or wheezing  Heart:  Normal apical impulse, regular rate and rhythm, normal S1 and S2, no S3 or S4, and no murmur noted  Breast:  Deferred   Abdomen: Gravid, Non-Tender  Extremities:  no calf tenderness, non edematous, DTRs: normal    Pelvic Exam:  FETALPRESENTATION:Cephalic  DILATION:  3 cm  EFFACEMENT:   80%  STATION:  -2 cm  CONSISTENCY:  soft  POSITION:  mid    Mejía Score      0        1         2         3        Patient  Dilation                clsd     1-2      3-4      5-6      2  Effacement           0-30   40-50  60-70  >80     3  Station                  -3        -2       -1/0     +1/+2  1  Consistency         Firm    Med     Soft               2  Position                Post    Mid      Ant                1                                                    Mejía Score: 9    MEMBRANES:  Ruptured clear fluid                                FETAL HEART RATE:    Baseline: 150      Variability: moderate      Accelerations: present      Decelerations: absent      FHR: Category: 1          CONTRACTIONS:   Frequency: occasional   Duration: 60-80 sec  Intensity: mile  Resting tone: palpates soft between contractions     Lab Results:   Blood Type/Rh:    ABO/Rh   Date Value Ref Range Status   2020 O POS  Final     Antibody Screen:    Antibody Screen   Date Value Ref Range Status   2020 NEG  Final   2015 No Irregular Antibodies Found  Final     Hemoglobin:   Hemoglobin   Date Value Ref Range Status   2020 12.5 12.0 - 16.0 g/dL Final     Hematocrit:   Hematocrit   Date Value Ref Range Status   2020 36.7 (L) 37.0 - 47.0 % Final     Platelet Count:   Platelets   Date Value Ref Range Status   2020 127 (L) 130 - 400 K/uL Final     Hepatitis B Surface Antigen: neg  Group B Strep:  neg  RPR: neg      ASSESSMENT/PLAN:  Bren Radha is a 27 y.o. female   At 37w6d    Other:   1. Admit to LDR with routine order set  2. Labor support prn  3.  Pain management and epidural

## 2020-04-23 NOTE — FLOWSHEET NOTE
Pt arrived to L&D with c/o ROM. She stated that she has had some light vaginal bleeding. She denies contractions and reports positive fetal movement. Amnisure +. EFM and TOCO applied to soft non-tender abd.

## 2020-04-24 VITALS
RESPIRATION RATE: 16 BRPM | TEMPERATURE: 97.9 F | HEART RATE: 94 BPM | SYSTOLIC BLOOD PRESSURE: 100 MMHG | OXYGEN SATURATION: 94 % | BODY MASS INDEX: 30.9 KG/M2 | DIASTOLIC BLOOD PRESSURE: 66 MMHG | WEIGHT: 153 LBS

## 2020-04-24 LAB
HCT VFR BLD CALC: 34 % (ref 37–47)
HEMOGLOBIN: 11.3 G/DL (ref 12–16)
MCH RBC QN AUTO: 32.3 PG (ref 27–31)
MCHC RBC AUTO-ENTMCNC: 33.2 G/DL (ref 33–37)
MCV RBC AUTO: 97.1 FL (ref 81–99)
PDW BLD-RTO: 13.3 % (ref 11.5–14.5)
PLATELET # BLD: 142 K/UL (ref 130–400)
PMV BLD AUTO: 10.8 FL (ref 9.4–12.3)
RBC # BLD: 3.5 M/UL (ref 4.2–5.4)
WBC # BLD: 14.7 K/UL (ref 4.8–10.8)

## 2020-04-24 PROCEDURE — 36415 COLL VENOUS BLD VENIPUNCTURE: CPT

## 2020-04-24 PROCEDURE — 85027 COMPLETE CBC AUTOMATED: CPT

## 2020-04-24 PROCEDURE — 6370000000 HC RX 637 (ALT 250 FOR IP): Performed by: ADVANCED PRACTICE MIDWIFE

## 2020-04-24 PROCEDURE — 99238 HOSP IP/OBS DSCHRG MGMT 30/<: CPT | Performed by: NURSE PRACTITIONER

## 2020-04-24 RX ORDER — IBUPROFEN 800 MG/1
800 TABLET ORAL EVERY 8 HOURS PRN
Qty: 90 TABLET | Refills: 0 | Status: SHIPPED | OUTPATIENT
Start: 2020-04-24 | End: 2020-11-13 | Stop reason: ALTCHOICE

## 2020-04-24 RX ADMIN — IBUPROFEN 800 MG: 400 TABLET ORAL at 16:15

## 2020-04-24 RX ADMIN — DOCUSATE SODIUM 100 MG: 100 CAPSULE, LIQUID FILLED ORAL at 07:50

## 2020-04-24 RX ADMIN — IBUPROFEN 800 MG: 400 TABLET ORAL at 07:50

## 2020-04-24 ASSESSMENT — PAIN SCALES - GENERAL
PAINLEVEL_OUTOF10: 3
PAINLEVEL_OUTOF10: 6

## 2020-04-27 ENCOUNTER — HOSPITAL ENCOUNTER (OUTPATIENT)
Dept: LABOR AND DELIVERY | Age: 25
Discharge: HOME | End: 2020-04-27
Payer: COMMERCIAL

## 2020-04-27 PROCEDURE — S9443 LACTATION CLASS: HCPCS

## 2020-04-28 ENCOUNTER — HOSPITAL ENCOUNTER (OUTPATIENT)
Dept: LABOR AND DELIVERY | Age: 25
Discharge: HOME | End: 2020-04-28
Payer: COMMERCIAL

## 2020-04-28 PROCEDURE — S9443 LACTATION CLASS: HCPCS

## 2020-05-07 ENCOUNTER — TELEMEDICINE (OUTPATIENT)
Dept: OBGYN | Age: 25
End: 2020-05-07

## 2020-05-07 PROBLEM — Z36.89 NST (NON-STRESS TEST) REACTIVE: Status: RESOLVED | Noted: 2020-04-09 | Resolved: 2020-05-07

## 2020-05-07 PROBLEM — Z3A.37 37 WEEKS GESTATION OF PREGNANCY: Status: RESOLVED | Noted: 2020-04-23 | Resolved: 2020-05-07

## 2020-05-07 PROCEDURE — 0503F POSTPARTUM CARE VISIT: CPT | Performed by: ADVANCED PRACTICE MIDWIFE

## 2020-05-07 NOTE — PATIENT INSTRUCTIONS
link.  Current as of: August 21, 2019Content Version: 12.4  © 3130-9239 Healthwise, Incorporated. Care instructions adapted under license by [facility] ([facility]). If you have questions about a medical condition or this instruction, always ask your healthcare professional. [address] any warranty or liability for your use of this information. Patient Education        Depression After Childbirth: Care Instructions  Your Care Instructions    Many women get the \"baby blues\" during the first few days after childbirth. You may lose sleep, feel irritable, and cry easily. You may feel happy one minute and sad the next. Hormone changes are one cause of these emotional changes. Also, the demands of a new baby, along with visits from relatives or other family needs, add to a mother's stress. The \"baby blues\" often peak around the fourth day. Then they ease up in less than 2 weeks. If your moodiness or anxiety lasts for more than 2 weeks, or if you feel like life is not worth living, you may have postpartum depression. This is different for each mother. Some mothers with serious depression may worry intensely about their infant's well-being. Others may feel distant from their child. Some mothers might even feel that they might harm their baby. A mother may have signs of paranoia, wondering if someone is watching her. Depression is not a sign of weakness. It is a medical condition that requires treatment. Medicine and counseling often work well to reduce depression. Talk to your doctor about taking antidepressant medicine while breastfeeding. Follow-up care is a key part of your treatment and safety. Be sure to make and go to all appointments, and call your doctor if you are having problems. It's also a good idea to know your test results and keep a list of the medicines you take. How do you know if you are depressed? With all the changes in your life, you may not know if you are depressed. Pregnancy sometimes causes changes in how you feel that are similar to the symptoms of depression. Symptoms of depression include:  · Feeling sad or hopeless and losing interest in daily activities. These are the most common symptoms of depression. · Sleeping too much or not enough. · Feeling tired. You may feel as if you have no energy. · Eating too much or too little. · Writing or talking about death, such as writing suicide notes or talking about guns, knives, or pills. Keep the numbers for these national suicide hotlines: 7-483-240-TALK (6-895.884.3411) and 9-033-RZXEJTZ (3-793.231.9938). If you or someone you know talks about suicide or feeling hopeless, get help right away. How can you care for yourself at home? · Be safe with medicines. Take your medicines exactly as prescribed. Call your doctor if you think you are having a problem with your medicine. · Eat a healthy diet so that you can keep up your energy. · Get regular daily exercise, such as walks, to help improve your mood. · Get as much sunlight as possible. Keep your shades and curtains open. Get outside as much as you can. · Avoid using alcohol or other substances to feel better. · Get as much rest and sleep as possible. Avoid doing too much. Being too tired can increase depression. · Play stimulating music throughout your day and soothing music at night. · Schedule outings and visits with friends and family. Ask them to call you regularly, so that you do not feel alone. · Ask for help with preparing food and other daily tasks. Family and friends are often happy to help a mother with a . · Be honest with yourself and those who care about you. Tell them about your struggle. · Join a support group of new mothers. No one can better understand the challenges of caring for a  than other new mothers. · If you feel like life is not worth living or are feeling hopeless, get help right away.  Keep the numbers for these national suicide hotlines: 5-971-639-TALK (3-598.335.4385) and 7-360-HJYDIUW (8-580.361.6765). When should you call for help? Call 911 anytime you think you may need emergency care. For example, call if:    · You feel you cannot stop from hurting yourself, your baby, or someone else.   [facility] your doctor now or seek immediate medical care if:    · You are having trouble caring for yourself or your baby.     · You hear voices.   [place] closely for changes in your health, and be sure to contact your doctor if:    · You have problems with your depression medicine.     · You do not get better as expected. Where can you learn more? Go to https://[employer] org and sign in to your [employer] account. Enter N343 in the LiveRSVP box to learn more about \"Depression After Childbirth: Care Instructions. \"     If you do not have an account, please click on the \"Sign Up Now\" link. Current as of: May 28, 2019Content Version: 12.4  © 3015-9512 Healthwise, Incorporated. Care instructions adapted under license by [facility] ([facility]). If you have questions about a medical condition or this instruction, always ask your healthcare professional. [address] any warranty or liability for your use of this information.

## 2020-05-07 NOTE — PROGRESS NOTES
[facility]  CNM Office Note  TELEHEALTH EVALUATION -- Audio/Visual (During PQQPX-98 public health emergency)    Herb Osborn is a 27 y.o. female who presents today for her medical conditions/ complaints as noted below. Chief Complaint   Patient presents with    Postpartum Care         HPI  Leobardo Mcdonald signed on for VV. Today she is 2 weeks PP. She reports getting little sleep. She is breastfeeding without difficulty, reports minimal bleeding, and no significant pain. Patient Active Problem List   Diagnosis    GERD (gastroesophageal reflux disease)    Gastroparesis    Postural orthostatic tachycardia syndrome    Andreas-Danlos syndrome    Anemia    Low back pain    Headache       Patient's last menstrual period was 2019.   [id number]    Past Medical History:   Diagnosis Date    Anemia 2020    Anxiety     Anxiety disorder     Eczema     Andreas-Danlos syndrome 2017    Gastritis     Gastroparesis     GERD (gastroesophageal reflux disease)     History of chicken pox     IBS (irritable bowel syndrome)     Postural orthostatic tachycardia syndrome 2017    Stress incontinence 3/19/2019     Past Surgical History:   Procedure Laterality Date     SECTION      x 1    CHOLECYSTECTOMY      GALLBLADDER SURGERY  2014    OTHER SURGICAL HISTORY  2018        TONSILLECTOMY       Family History   Problem Relation Age of Onset    High Blood Pressure Father     Diabetes Father         Borderline    Heart Attack Father     Other Mother         High grade dysplasia on pap smear    Breast Cancer Mother     Diabetes Paternal Grandfather      Social History     Tobacco Use    Smoking status: Never Smoker    Smokeless tobacco: Never Used   Substance Use Topics    Alcohol use: No       Current Outpatient Medications   Medication Sig Dispense Refill    ibuprofen (ADVIL;MOTRIN) 800 MG tablet Take 1 tablet by mouth every 8 hours as needed for Pain 90 tablet 0  omeprazole (PRILOSEC) 10 MG delayed release capsule Take 10 mg by mouth daily      Cholecalciferol (VITAMIN D) 50 MCG (2000 UT) CAPS capsule Take by mouth Indications: 92356       Ascorbic Acid (VITAMIN C) 250 MG tablet Take 250 mg by mouth daily      Prenatal Vit-Fe Fumarate-FA (PRENATAL VITAMIN) 27-1 MG TABS tablet Take 1 tablet by mouth daily       No current facility-administered medications for this visit. Allergies   Allergen Reactions    Other      Other reaction(s): Other (See Comments)  Band-Aids causes welps and rash    Percocet [Oxycodone-Acetaminophen] Rash    Prochlorperazine Rash     There were no vitals filed for this visit. There is no height or weight on file to calculate BMI. Review of Systems    Due to this being a TeleHealth encounter, evaluation of the following organ systems is limited: Vitals/Constitutional/EENT/Resp/CV/GI//MS/Neuro/Skin/Heme-Lymph-Imm. Physical Exam     Diagnosis Orders   1. 2 weeks postpartum follow-up     2. Encounter for postpartum care of lactating mother         MEDICATIONS:  No orders of the defined types were placed in this encounter. ORDERS:  No orders of the defined types were placed in this encounter. PLAN:  1. Pursuant to the emergency declaration under the [facility], 1135 waiver authority and the [employer] Act, this Virtual  Visit was conducted, with patient's consent, to reduce the patient's risk of exposure to COVID-19 and provide continuity of care for an established patient. Services were provided through a video synchronous discussion virtually to substitute for in-person clinic visit.

## 2020-05-29 ENCOUNTER — TELEPHONE (OUTPATIENT)
Dept: OBGYN | Age: 25
End: 2020-05-29

## 2020-05-29 RX ORDER — METRONIDAZOLE 7.5 MG/G
GEL TOPICAL
Qty: 1 TUBE | Refills: 0 | Status: SHIPPED | OUTPATIENT
Start: 2020-05-29 | End: 2020-11-13 | Stop reason: ALTCHOICE

## 2020-05-29 NOTE — TELEPHONE ENCOUNTER
Patient is wanting to speak to a nurse. Silvestre Liang requests that office return their call. The best time to reach her is Anytime. Thank you.

## 2020-06-02 ENCOUNTER — PATIENT MESSAGE (OUTPATIENT)
Dept: OBGYN | Age: 25
End: 2020-06-02

## 2020-06-02 RX ORDER — METRONIDAZOLE 7.5 MG/G
GEL VAGINAL
Qty: 1 TUBE | Refills: 0 | Status: SHIPPED | OUTPATIENT
Start: 2020-06-02 | End: 2020-06-09

## 2020-06-02 NOTE — TELEPHONE ENCOUNTER
From: Fred Ugalde  To: VALE Duarte - CNP  Sent: 6/2/2020 9:35 AM CDT  Subject: Prescription Question    My Metrogel says it's only for external use only. There is not an applicator or anything. How much am I supposed to use vaginally?     Thank you,    Fred Ugalde

## 2020-08-19 ENCOUNTER — OFFICE VISIT (OUTPATIENT)
Dept: CARDIOLOGY | Facility: CLINIC | Age: 25
End: 2020-08-19

## 2020-08-19 VITALS
SYSTOLIC BLOOD PRESSURE: 130 MMHG | DIASTOLIC BLOOD PRESSURE: 80 MMHG | BODY MASS INDEX: 28.02 KG/M2 | HEIGHT: 59 IN | WEIGHT: 139 LBS | OXYGEN SATURATION: 99 % | HEART RATE: 90 BPM

## 2020-08-19 DIAGNOSIS — R07.89 ATYPICAL CHEST PAIN: Primary | ICD-10-CM

## 2020-08-19 DIAGNOSIS — R00.0 TACHYCARDIA: ICD-10-CM

## 2020-08-19 PROCEDURE — 99214 OFFICE O/P EST MOD 30 MIN: CPT | Performed by: INTERNAL MEDICINE

## 2020-08-19 PROCEDURE — 93000 ELECTROCARDIOGRAM COMPLETE: CPT | Performed by: INTERNAL MEDICINE

## 2020-08-19 RX ORDER — FLUOXETINE 10 MG/1
10 CAPSULE ORAL DAILY
Status: ON HOLD | COMMUNITY
End: 2020-09-30

## 2020-08-19 RX ORDER — ONDANSETRON HYDROCHLORIDE 8 MG/1
8 TABLET, FILM COATED ORAL EVERY 8 HOURS PRN
COMMUNITY
End: 2021-11-16

## 2020-08-19 NOTE — PROGRESS NOTES
Subjective:     Encounter Date:08/19/2020      Patient ID: Nai Golden is a 24 y.o. female with a history of orthostatic hypotension, sinus tachycardia, irritable bowel syndrome and gastroparesis who presents today for follow-up.    Chief Complaint: Follow-up    Chest Pain    This is a new problem. The current episode started 1 to 4 weeks ago. The onset quality is sudden. The problem occurs intermittently. The problem has been waxing and waning. The pain is present in the lateral region. The pain is severe. The quality of the pain is described as pressure. The pain radiates to the left shoulder and upper back. Associated symptoms include dizziness and palpitations. Pertinent negatives include no abdominal pain, cough, fever, headaches, nausea, orthopnea, PND, shortness of breath, syncope or vomiting. The pain is aggravated by exertion. She has tried nothing for the symptoms. There are no known risk factors.   Pertinent negatives for past medical history include no CAD, no diabetes, no hyperlipidemia and no hypertension.   Her family medical history is significant for CAD.   Palpitations    This is a recurrent problem. The problem occurs intermittently. The problem has been gradually worsening. Nothing aggravates the symptoms. Associated symptoms include chest pain and dizziness. Pertinent negatives include no coughing, fever, nausea, shortness of breath, syncope or vomiting. She has tried nothing for the symptoms. Risk factors include family history.      This patient presents today for follow-up.  She says that since we last saw her she has continued to have some intermittent lightheadedness and dizziness but no syncope.  She has a history of orthostatic hypotension.  She also has a history of intermittent tachycardia which has previously been diagnosed as sinus tachycardia.  The patient notes that since giving birth a few months ago, she has had significant elevation of heart rate at times with very  variable heart rate.  No associated symptoms however the patient has developed intermittent chest discomfort as well.  This is left-sided, severe, pressure and discomfort, radiating into the left shoulder and upper back, sometimes exertional although sometimes occurring at rest.  No associated signs or symptoms, generally lasting minutes and then resolving without any intervention.  The patient does not have any personal history of cardiovascular disease but does have a family history of cardiovascular disease.  The patient does not have hypertension, hyperlipidemia or diabetes.      Current Outpatient Medications:   •  FLUoxetine (PROzac) 10 MG capsule, Take 10 mg by mouth Daily., Disp: , Rfl:   •  ondansetron (Zofran) 8 MG tablet, Take 8 mg by mouth Every 8 (Eight) Hours As Needed for Nausea or Vomiting., Disp: , Rfl:   •  promethazine (PHENERGAN) 25 MG tablet, Take 25 mg by mouth Every 6 (Six) Hours As Needed for Nausea or Vomiting., Disp: , Rfl:     Allergies   Allergen Reactions   • Compazine [Prochlorperazine Edisylate] Rash   • Other Other (See Comments)     Band-Aids causes welps and rash     • Percocet [Oxycodone-Acetaminophen] Rash     Social History     Tobacco Use   • Smoking status: Never Smoker   • Smokeless tobacco: Never Used   Substance Use Topics   • Alcohol use: No     Review of Systems   Constitution: Negative for fever and weight loss.   Cardiovascular: Positive for chest pain and palpitations. Negative for dyspnea on exertion, leg swelling, orthopnea, paroxysmal nocturnal dyspnea and syncope.   Respiratory: Negative for cough, shortness of breath and wheezing.    Hematologic/Lymphatic: Negative for bleeding problem. Does not bruise/bleed easily.   Gastrointestinal: Negative for abdominal pain, nausea and vomiting.   Neurological: Positive for dizziness and light-headedness. Negative for headaches and loss of balance.       ECG 12 Lead  Date/Time: 8/19/2020 1:39 PM  Performed by: Beny  "Cristiano Sauceda MD  Authorized by: Cristiano Swan MD   Comparison: compared with previous ECG from 6/25/2018  Similar to previous ECG  Rhythm: sinus rhythm  Rate: normal  BPM: 87  Conduction: conduction normal  ST Segments: ST segments normal  T Waves: T waves normal  QRS axis: normal  Other: no other findings    Clinical impression: normal ECG             Objective:     Physical Exam   Constitutional: She is oriented to person, place, and time. She appears well-developed and well-nourished. No distress.   HENT:   Head: Normocephalic and atraumatic.   Mouth/Throat: Oropharynx is clear and moist.   Eyes: Pupils are equal, round, and reactive to light. EOM are normal.   Neck: Normal range of motion. Neck supple. No JVD present. No thyromegaly present.   Cardiovascular: Normal rate, regular rhythm, S1 normal, S2 normal, normal heart sounds and intact distal pulses. Exam reveals no gallop and no friction rub.   No murmur heard.  Pulmonary/Chest: Effort normal and breath sounds normal.   Abdominal: Soft. Bowel sounds are normal. She exhibits no distension. There is no tenderness.   Musculoskeletal: Normal range of motion. She exhibits no edema.   Neurological: She is alert and oriented to person, place, and time. No cranial nerve deficit.   Skin: Skin is warm and dry. No rash noted. No cyanosis or erythema. Nails show no clubbing.   Psychiatric: She has a normal mood and affect.   Vitals reviewed.    /80   Pulse 90   Ht 149.9 cm (59\")   Wt 63 kg (139 lb)   SpO2 99%   BMI 28.07 kg/m²     Data/Lab Review:     Holter monitor on 5/15/2017: 48-hour Holter monitor showed average heart rate of 94, minimum heart rate 63, maximum heart rate 167.  No evidence of atrial or ventricular arrhythmias.  Rare PVCs with no PACs noted.  Dyspnea was reported and had no correlation to any underlying rhythm.    Echocardiogram in May 2017: Normal left ventricular systolic function, no significant valvular abnormalities    "   Assessment:          Diagnosis Plan   1. Atypical chest pain  ECG 12 Lead    Treadmill Stress Test   2. Tachycardia  Holter Monitor - 48 Hour        Plan:       1.  Atypical chest pain: This is a new problem for this patient.  Her chest discomfort does have atypical features but she does have a family history of heart disease.  I think that a treadmill stress test is reasonable to further evaluate for myocardial ischemia.  While my suspicion is relatively low, I think that a stress test is reasonable at this time.    2.  Tachycardia/palpitations: Patient has a history of chronic intermittent palpitations.  I did review her previous Holter monitor from 2017 and summarized this above.  We will place her in another cardiac monitor at this time to reevaluate her heart rate and rhythm.    Patient's Body mass index is 28.07 kg/m². BMI is above normal parameters. Recommendations include: exercise counseling and nutrition counseling.    Follow-up will be pending the results of the patient's testing.

## 2020-08-28 ENCOUNTER — HOSPITAL ENCOUNTER (OUTPATIENT)
Dept: CARDIOLOGY | Facility: HOSPITAL | Age: 25
Discharge: HOME | End: 2020-08-28
Admitting: INTERNAL MEDICINE

## 2020-08-28 VITALS
SYSTOLIC BLOOD PRESSURE: 120 MMHG | HEIGHT: 59 IN | BODY MASS INDEX: 28 KG/M2 | WEIGHT: 138.89 LBS | HEART RATE: 112 BPM | DIASTOLIC BLOOD PRESSURE: 82 MMHG

## 2020-08-28 DIAGNOSIS — R07.89 ATYPICAL CHEST PAIN: ICD-10-CM

## 2020-08-28 LAB
BH CV STRESS BP STAGE 1: NORMAL
BH CV STRESS BP STAGE 2: NORMAL
BH CV STRESS DURATION MIN STAGE 1: 3
BH CV STRESS DURATION MIN STAGE 2: 3
BH CV STRESS DURATION SEC STAGE 1: 0
BH CV STRESS DURATION SEC STAGE 2: 0
BH CV STRESS DURATION SEC STAGE 3: 55
BH CV STRESS GRADE STAGE 1: 10
BH CV STRESS GRADE STAGE 2: 12
BH CV STRESS GRADE STAGE 3: 14
BH CV STRESS HR STAGE 1: 142
BH CV STRESS HR STAGE 2: 184
BH CV STRESS HR STAGE 3: 199
BH CV STRESS METS STAGE 1: 5
BH CV STRESS METS STAGE 2: 7.5
BH CV STRESS METS STAGE 3: 10
BH CV STRESS PROTOCOL 1: NORMAL
BH CV STRESS RECOVERY BP: NORMAL MMHG
BH CV STRESS RECOVERY HR: 126 BPM
BH CV STRESS SPEED STAGE 1: 1.7
BH CV STRESS SPEED STAGE 2: 2.5
BH CV STRESS SPEED STAGE 3: 3.4
BH CV STRESS STAGE 1: 1
BH CV STRESS STAGE 2: 2
BH CV STRESS STAGE 3: 3
MAXIMAL PREDICTED HEART RATE: 198 BPM
PERCENT MAX PREDICTED HR: 100.51 %
STRESS BASELINE BP: NORMAL MMHG
STRESS BASELINE HR: 114 BPM
STRESS PERCENT HR: 118 %
STRESS POST EXERCISE DUR MIN: 6 MIN
STRESS POST EXERCISE DUR SEC: 55 SEC
STRESS POST PEAK BP: NORMAL MMHG
STRESS POST PEAK HR: 199 BPM
STRESS TARGET HR: 168 BPM

## 2020-08-28 PROCEDURE — 93018 CV STRESS TEST I&R ONLY: CPT | Performed by: INTERNAL MEDICINE

## 2020-08-28 PROCEDURE — 93017 CV STRESS TEST TRACING ONLY: CPT

## 2020-08-28 RX ORDER — DESVENLAFAXINE SUCCINATE 50 MG/1
TABLET, EXTENDED RELEASE ORAL
COMMUNITY
Start: 2020-08-26 | End: 2021-11-16

## 2020-08-28 RX ORDER — METRONIDAZOLE 7.5 MG/G
GEL TOPICAL
Status: ON HOLD | COMMUNITY
Start: 2020-05-29 | End: 2020-09-30

## 2020-08-28 RX ORDER — LEVOCETIRIZINE DIHYDROCHLORIDE 5 MG/1
TABLET, FILM COATED ORAL
COMMUNITY
Start: 2020-08-26 | End: 2021-11-16

## 2020-08-28 RX ORDER — ESCITALOPRAM OXALATE 10 MG/1
TABLET ORAL
Status: ON HOLD | COMMUNITY
Start: 2020-07-15 | End: 2020-09-30

## 2020-08-28 RX ORDER — MECLIZINE HYDROCHLORIDE 25 MG/1
TABLET ORAL
Status: ON HOLD | COMMUNITY
Start: 2020-07-15 | End: 2020-09-30

## 2020-09-03 ENCOUNTER — TELEPHONE (OUTPATIENT)
Dept: CARDIOLOGY | Facility: CLINIC | Age: 25
End: 2020-09-03

## 2020-09-03 NOTE — TELEPHONE ENCOUNTER
I called pt to see if she mailed the monitor back yet.  She said something happened to her box and she had to wait for the company to send her another box.  She mailed it on Monday 8/31.  It is not showing that it has been scanned at the post office.  Her  took it and he did not get a tracking number.  I told pt I would keep an eye out for it to show up and let her know if it did not.  Sandor Chambers, CMA

## 2020-09-16 ENCOUNTER — OFFICE VISIT (OUTPATIENT)
Dept: GASTROENTEROLOGY | Facility: CLINIC | Age: 25
End: 2020-09-16

## 2020-09-16 VITALS
BODY MASS INDEX: 28.43 KG/M2 | TEMPERATURE: 97.7 F | HEART RATE: 88 BPM | HEIGHT: 59 IN | WEIGHT: 141 LBS | OXYGEN SATURATION: 99 % | DIASTOLIC BLOOD PRESSURE: 80 MMHG | SYSTOLIC BLOOD PRESSURE: 120 MMHG

## 2020-09-16 DIAGNOSIS — R19.8 ALTERED BOWEL FUNCTION: Primary | ICD-10-CM

## 2020-09-16 DIAGNOSIS — K62.5 RECTAL BLEEDING: ICD-10-CM

## 2020-09-16 PROCEDURE — 99213 OFFICE O/P EST LOW 20 MIN: CPT | Performed by: INTERNAL MEDICINE

## 2020-09-16 RX ORDER — LUBIPROSTONE 8 UG/1
8 CAPSULE ORAL 2 TIMES DAILY WITH MEALS
COMMUNITY
End: 2021-11-16

## 2020-09-16 NOTE — PROGRESS NOTES
"Chief Complaint   Patient presents with   • Abdominal Pain     states she has bowel pain stomach hurts        PCP: Ju Garibay APRN  REFER: No ref. provider found    Subjective     HPI    Lower abdominal cramping with diarrhea occurring over past year.   She will have large amount of diarrhea then not experience bowel movement for over one week.  When she has BM stool is initially described as hard then rest of BM is \"explosive.\"   No weight  Loss.  She has gastroparesis.  Utilizes Zofran daily.  Observes brbpr with BM.  EGD     Past Medical History:   Diagnosis Date   • Anemia    • Depression    • Dyspepsia    • Samantha-Danlos syndrome     2018   • Gastroparesis    • GERD (gastroesophageal reflux disease)    • IBS (irritable bowel syndrome)    • Scoliosis deformity of spine 2017   • Sinus tachycardia    • Tachycardia        Past Surgical History:   Procedure Laterality Date   •  SECTION     • CHOLECYSTECTOMY     • ENDOSCOPY  2016   • ENDOSCOPY N/A 2017    Procedure: ESOPHAGOGASTRODUODENOSCOPY possible dilation ;  Surgeon: Franky Berman MD;  [facility] ENDOSCOPY;  Service:    • TONSILLECTOMY         Outpatient Medications Marked as Taking for the 20 encounter (Office Visit) with Ronaldo Cole, DO   Medication Sig Dispense Refill   • desvenlafaxine (PRISTIQ) 50 MG 24 hr tablet      • levocetirizine (XYZAL) 5 MG tablet      • lubiprostone (Amitiza) 8 MCG capsule Take 8 mcg by mouth 2 (Two) Times a Day With Meals.     • ondansetron (Zofran) 8 MG tablet Take 8 mg by mouth Every 8 (Eight) Hours As Needed for Nausea or Vomiting.     • promethazine (PHENERGAN) 25 MG tablet Take 25 mg by mouth Every 6 (Six) Hours As Needed for Nausea or Vomiting.         Allergies   Allergen Reactions   • Compazine [Prochlorperazine Edisylate] Rash   • Other Other (See Comments)     Band-Aids causes welps and rash     • Percocet [Oxycodone-Acetaminophen] Rash       Social History "     Socioeconomic History   • Marital status:      Spouse name: Not on file   • Number of children: Not on file   • Years of education: Not on file   • Highest education level: Not on file   Tobacco Use   • Smoking status: Never Smoker   • Smokeless tobacco: Never Used   Substance and Sexual Activity   • Alcohol use: No   • Drug use: No   • Sexual activity: Yes     Partners: Male       Family History   Problem Relation Age of Onset   • Hypertension Mother    • Hypertension Father    • Arthritis Father    • Hyperlipidemia Father    • Heart disease Father    • Migraines Father    • No Known Problems Sister    • No Known Problems Brother    • Diabetes Paternal Grandfather    • Hypertension Paternal Grandfather    • No Known Problems Sister    • No Known Problems Sister    • Seizures Nephew    • Colon polyps Maternal Grandmother    • Colon cancer Neg Hx    • Esophageal cancer Neg Hx    • Stroke Neg Hx        Review of Systems   Constitutional: Negative for fatigue, fever and unexpected weight change.   HENT: Negative for hearing loss, sore throat and voice change.    Eyes: Negative for visual disturbance.   Respiratory: Negative for cough, shortness of breath and wheezing.    Cardiovascular: Negative for chest pain and palpitations.   Gastrointestinal: Positive for abdominal pain and diarrhea. Negative for blood in stool and vomiting.   Endocrine: Negative for polydipsia and polyuria.   Genitourinary: Negative for difficulty urinating, dysuria, hematuria and urgency.   Musculoskeletal: Negative for joint swelling and myalgias.   Skin: Negative for color change, rash and wound.   Neurological: Negative for dizziness, tremors, seizures and syncope.   Hematological: Does not bruise/bleed easily.   Psychiatric/Behavioral: Negative for agitation and confusion. The patient is not nervous/anxious.        Objective     Vitals:    09/16/20 1352   BP: 120/80   Pulse: 88   Temp: 97.7 °F (36.5 °C)   SpO2: 99%   Weight: 64 kg  "(141 lb)   Height: 149.9 cm (59\")     Body mass index is 28.48 kg/m².    Physical Exam  Constitutional:       Appearance: She is well-developed.   HENT:      Head: Normocephalic and atraumatic.   Eyes:      General: No scleral icterus.     Conjunctiva/sclera: Conjunctivae normal.      Pupils: Pupils are equal, round, and reactive to light.   Neck:      Thyroid: No thyroid mass or thyromegaly.      Vascular: No JVD.   Cardiovascular:      Rate and Rhythm: Normal rate and regular rhythm.      Heart sounds: Normal heart sounds. No murmur. No friction rub. No gallop.    Pulmonary:      Effort: Pulmonary effort is normal. No accessory muscle usage or respiratory distress.      Breath sounds: Normal breath sounds. No wheezing or rales.   Abdominal:      General: Bowel sounds are normal. There is no distension.      Palpations: Abdomen is soft. There is no hepatomegaly, splenomegaly or mass.      Tenderness: There is no abdominal tenderness. There is no guarding or rebound.   Genitourinary:     Comments: Rectal-Did not examine  Musculoskeletal: Normal range of motion.   Skin:     General: Skin is warm and dry.   Neurological:      Mental Status: She is alert and oriented to person, place, and time.      Comments: Deemed a reliable historian, able to converse without difficulty and able to move all extremities without difficulty   Psychiatric:         Behavior: Behavior normal.         Imaging Results (Most Recent)     None          Body mass index is 28.48 kg/m².    Assessment/Plan     Nai was seen today for abdominal pain.    Diagnoses and all orders for this visit:    Altered bowel function  -     Case Request; Standing  -     Case Request    Rectal bleeding        COLONOSCOPY WITH ANESTHESIA (N/A)      Discussed multi day prep      Precautions are currently being put in place due to COVID-19.  I have explained to Nai Golden they will be required to undergo COVID testing prior to their procedure.  Nai HARDEN" [place] verbalized understanding and was willing to proceed.    Patient's Body mass index is 28.48 kg/m². BMI is above normal parameters. Recommendations include: no follow up.       Ronaldo Cole,   09/16/20          There are no Patient Instructions on file for this visit.

## 2020-09-17 PROBLEM — R19.8 ALTERED BOWEL FUNCTION: Status: ACTIVE | Noted: 2020-09-17

## 2020-09-23 ENCOUNTER — TRANSCRIBE ORDERS (OUTPATIENT)
Dept: ADMINISTRATIVE | Facility: HOSPITAL | Age: 25
End: 2020-09-23

## 2020-09-23 DIAGNOSIS — Z01.818 PRE-OP TESTING: Primary | ICD-10-CM

## 2020-09-29 ENCOUNTER — TELEPHONE (OUTPATIENT)
Dept: GASTROENTEROLOGY | Facility: CLINIC | Age: 25
End: 2020-09-29

## 2020-09-29 NOTE — TELEPHONE ENCOUNTER
Patient just called to let me know her covid test is negative. She has her results with her and will bring a copy tomorrow.     Patient is schedule with Dr. Cole for 09/30 at 8:15am     Thank you

## 2020-09-29 NOTE — TELEPHONE ENCOUNTER
Spoke with patient this morning. She is having a rapid test done today in [place] and they will send me the results this afternoon.     Patient is schedule for a colonoscopy 09/30/2020 with Dr. Cole.     I explained to patient if we do not have the results in her chart by her procedure time tomorrow we will not be able to do procedure.

## 2020-09-30 ENCOUNTER — HOSPITAL ENCOUNTER (OUTPATIENT)
Facility: HOSPITAL | Age: 25
Setting detail: HOSPITAL OUTPATIENT SURGERY
Discharge: HOME | End: 2020-09-30
Attending: INTERNAL MEDICINE | Admitting: INTERNAL MEDICINE

## 2020-09-30 ENCOUNTER — ANESTHESIA (OUTPATIENT)
Dept: GASTROENTEROLOGY | Facility: HOSPITAL | Age: 25
End: 2020-09-30

## 2020-09-30 ENCOUNTER — ANESTHESIA EVENT (OUTPATIENT)
Dept: GASTROENTEROLOGY | Facility: HOSPITAL | Age: 25
End: 2020-09-30

## 2020-09-30 VITALS
RESPIRATION RATE: 16 BRPM | WEIGHT: 137 LBS | OXYGEN SATURATION: 98 % | HEART RATE: 103 BPM | TEMPERATURE: 97.4 F | HEIGHT: 59 IN | DIASTOLIC BLOOD PRESSURE: 71 MMHG | BODY MASS INDEX: 27.62 KG/M2 | SYSTOLIC BLOOD PRESSURE: 102 MMHG

## 2020-09-30 DIAGNOSIS — R19.8 ALTERED BOWEL FUNCTION: ICD-10-CM

## 2020-09-30 LAB — B-HCG UR QL: NEGATIVE

## 2020-09-30 PROCEDURE — 45378 DIAGNOSTIC COLONOSCOPY: CPT | Performed by: INTERNAL MEDICINE

## 2020-09-30 PROCEDURE — 81025 URINE PREGNANCY TEST: CPT | Performed by: NURSE ANESTHETIST, CERTIFIED REGISTERED

## 2020-09-30 PROCEDURE — 25010000002 PROPOFOL 10 MG/ML EMULSION: Performed by: NURSE ANESTHETIST, CERTIFIED REGISTERED

## 2020-09-30 RX ORDER — SODIUM CHLORIDE 0.9 % (FLUSH) 0.9 %
10 SYRINGE (ML) INJECTION AS NEEDED
Status: DISCONTINUED | OUTPATIENT
Start: 2020-09-30 | End: 2020-09-30 | Stop reason: HOSPADM

## 2020-09-30 RX ORDER — SODIUM CHLORIDE 9 MG/ML
500 INJECTION, SOLUTION INTRAVENOUS CONTINUOUS PRN
Status: DISCONTINUED | OUTPATIENT
Start: 2020-09-30 | End: 2020-09-30 | Stop reason: HOSPADM

## 2020-09-30 RX ORDER — PROPOFOL 10 MG/ML
VIAL (ML) INTRAVENOUS AS NEEDED
Status: DISCONTINUED | OUTPATIENT
Start: 2020-09-30 | End: 2020-09-30 | Stop reason: SURG

## 2020-09-30 RX ORDER — LIDOCAINE HYDROCHLORIDE 10 MG/ML
0.5 INJECTION, SOLUTION EPIDURAL; INFILTRATION; INTRACAUDAL; PERINEURAL ONCE AS NEEDED
Status: CANCELLED | OUTPATIENT
Start: 2020-09-30

## 2020-09-30 RX ADMIN — SODIUM CHLORIDE 500 ML: 9 INJECTION, SOLUTION INTRAVENOUS at 09:07

## 2020-09-30 RX ADMIN — PROPOFOL 100 MG: 10 INJECTION, EMULSION INTRAVENOUS at 09:47

## 2020-09-30 RX ADMIN — PROPOFOL 100 MG: 10 INJECTION, EMULSION INTRAVENOUS at 09:43

## 2020-09-30 RX ADMIN — PROPOFOL 100 MG: 10 INJECTION, EMULSION INTRAVENOUS at 09:52

## 2020-09-30 NOTE — ANESTHESIA PREPROCEDURE EVALUATION
Anesthesia Evaluation     Patient summary reviewed   no history of anesthetic complications:  NPO Solid Status: > 8 hours             Airway   Mallampati: I  TM distance: >3 FB  Neck ROM: full  Dental      Pulmonary - negative pulmonary ROS   Cardiovascular   Exercise tolerance: excellent (>7 METS)    (+) dysrhythmias Tachycardia,       Neuro/Psych- negative ROS  GI/Hepatic/Renal/Endo - negative ROS     Musculoskeletal     Abdominal    Substance History      OB/GYN          Other                        Anesthesia Plan    ASA 2     MAC       Anesthetic plan, all risks, benefits, and alternatives have been provided, discussed and informed consent has been obtained with: patient.

## 2020-09-30 NOTE — ANESTHESIA POSTPROCEDURE EVALUATION
Patient: Nai Golden    Procedure Summary     Date: 09/30/20 Room / Location: [facility] ENDOSCOPY 2 / [facility]    Anesthesia Start: 0942 Anesthesia Stop: 0957    Procedure: COLONOSCOPY WITH ANESTHESIA (N/A ) Diagnosis:       Altered bowel function      (Altered bowel function [R19.8])    Surgeon: Ronaldo Cole DO Provider: Rudolph Martinez CRNA    Anesthesia Type: MAC ASA Status: 2          Anesthesia Type: MAC    Vitals  Vitals Value Taken Time   BP 64/54 09/30/20 1005   Temp     Pulse 100 09/30/20 1006   Resp 19 09/30/20 1000   SpO2 99 % 09/30/20 1006   Vitals shown include unvalidated device data.        Post Anesthesia Care and Evaluation    Patient location during evaluation: PHASE II  Patient participation: complete - patient participated  Level of consciousness: awake and alert  Pain management: adequate  Airway patency: patent  Anesthetic complications: No anesthetic complications  PONV Status: none  Cardiovascular status: acceptable and stable  Respiratory status: acceptable and unassisted  Hydration status: acceptable

## 2020-11-11 ENCOUNTER — TELEPHONE (OUTPATIENT)
Dept: CARDIOLOGY | Facility: CLINIC | Age: 25
End: 2020-11-11

## 2020-11-11 NOTE — TELEPHONE ENCOUNTER
Bessy with the [facility] called and left a voicemail asking for the last EKG on this patient.  She can be reached at [phone] or her fax is [phone].  Thank you!

## 2020-11-13 ENCOUNTER — OFFICE VISIT (OUTPATIENT)
Dept: OBGYN | Age: 25
End: 2020-11-13
Payer: COMMERCIAL

## 2020-11-13 VITALS
HEART RATE: 103 BPM | WEIGHT: 141 LBS | DIASTOLIC BLOOD PRESSURE: 75 MMHG | BODY MASS INDEX: 28.43 KG/M2 | SYSTOLIC BLOOD PRESSURE: 111 MMHG | HEIGHT: 59 IN

## 2020-11-13 DIAGNOSIS — N92.6 IRREGULAR MENSES: ICD-10-CM

## 2020-11-13 LAB
BASOPHILS ABSOLUTE: 0.1 K/UL (ref 0–0.2)
BASOPHILS RELATIVE PERCENT: 0.5 % (ref 0–1)
EOSINOPHILS ABSOLUTE: 0.1 K/UL (ref 0–0.6)
EOSINOPHILS RELATIVE PERCENT: 0.8 % (ref 0–5)
HCT VFR BLD CALC: 42.2 % (ref 37–47)
HEMOGLOBIN: 14.6 G/DL (ref 12–16)
IMMATURE GRANULOCYTES #: 0 K/UL
LYMPHOCYTES ABSOLUTE: 3.2 K/UL (ref 1.1–4.5)
LYMPHOCYTES RELATIVE PERCENT: 24.5 % (ref 20–40)
MCH RBC QN AUTO: 30.4 PG (ref 27–31)
MCHC RBC AUTO-ENTMCNC: 34.6 G/DL (ref 33–37)
MCV RBC AUTO: 87.7 FL (ref 81–99)
MONOCYTES ABSOLUTE: 0.9 K/UL (ref 0–0.9)
MONOCYTES RELATIVE PERCENT: 6.8 % (ref 0–10)
NEUTROPHILS ABSOLUTE: 8.7 K/UL (ref 1.5–7.5)
NEUTROPHILS RELATIVE PERCENT: 67.1 % (ref 50–65)
PDW BLD-RTO: 11.7 % (ref 11.5–14.5)
PLATELET # BLD: 273 K/UL (ref 130–400)
PMV BLD AUTO: 10.6 FL (ref 9.4–12.3)
PROLACTIN: 6.62 NG/ML (ref 4.79–23.3)
RBC # BLD: 4.81 M/UL (ref 4.2–5.4)
T4 FREE: 1.21 NG/DL (ref 0.93–1.7)
TSH SERPL DL<=0.05 MIU/L-ACNC: 1.98 UIU/ML (ref 0.27–4.2)
WBC # BLD: 12.9 K/UL (ref 4.8–10.8)

## 2020-11-13 PROCEDURE — 99214 OFFICE O/P EST MOD 30 MIN: CPT | Performed by: NURSE PRACTITIONER

## 2020-11-13 PROCEDURE — G8427 DOCREV CUR MEDS BY ELIG CLIN: HCPCS | Performed by: NURSE PRACTITIONER

## 2020-11-13 PROCEDURE — G8484 FLU IMMUNIZE NO ADMIN: HCPCS | Performed by: NURSE PRACTITIONER

## 2020-11-13 PROCEDURE — G8419 CALC BMI OUT NRM PARAM NOF/U: HCPCS | Performed by: NURSE PRACTITIONER

## 2020-11-13 PROCEDURE — 1036F TOBACCO NON-USER: CPT | Performed by: NURSE PRACTITIONER

## 2020-11-13 RX ORDER — DESVENLAFAXINE 25 MG/1
25 TABLET, EXTENDED RELEASE ORAL DAILY
COMMUNITY

## 2020-11-13 RX ORDER — FAMOTIDINE 10 MG
10 TABLET ORAL 2 TIMES DAILY
COMMUNITY

## 2020-11-13 ASSESSMENT — ENCOUNTER SYMPTOMS
RESPIRATORY NEGATIVE: 1
CONSTIPATION: 0
EYES NEGATIVE: 1
GASTROINTESTINAL NEGATIVE: 1
DIARRHEA: 0
ALLERGIC/IMMUNOLOGIC NEGATIVE: 1

## 2020-11-13 NOTE — PROGRESS NOTES
Pt is here for irregular periods and pain during intercourse. She has been having pain with intercourse for about 3 months.

## 2020-11-13 NOTE — PROGRESS NOTES
Yamileth Rincon is a 27 y.o. female who presents today for her medical conditions/ complaints as noted below. Yamileth Rincon is c/o of Menstrual Problem and Dyspareunia        HPI  Pt presents with multiple complaints. Reports that her periods are about 21-22 days in between for about 3 months. Having pain in her pelvis all throughout the month and deep with intercourse. Not taking birth control because her  has had vasectomy. Reports recurrent BV and has tried multiple regimens without success. Patient's last menstrual period was 2020 (exact date). [id number]    Past Medical History:   Diagnosis Date    Anemia 2020    Anxiety     Anxiety disorder     Eczema     Andreas-Danlos syndrome 2017    Gastritis     Gastroparesis     GERD (gastroesophageal reflux disease)     History of chicken pox     IBS (irritable bowel syndrome)     Postural orthostatic tachycardia syndrome 2017    Stress incontinence 3/19/2019     Past Surgical History:   Procedure Laterality Date     SECTION      x 1    CHOLECYSTECTOMY      GALLBLADDER SURGERY  2014    OTHER SURGICAL HISTORY  2018        TONSILLECTOMY       Family History   Problem Relation Age of Onset    High Blood Pressure Father     Diabetes Father         Borderline    Heart Attack Father     Other Mother         High grade dysplasia on pap smear    Breast Cancer Mother     Diabetes Paternal Grandfather      Social History     Tobacco Use    Smoking status: Never Smoker    Smokeless tobacco: Never Used   Substance Use Topics    Alcohol use: No       Current Outpatient Medications   Medication Sig Dispense Refill    famotidine (PEPCID) 10 MG tablet Take 10 mg by mouth 2 times daily      desvenlafaxine succinate (PRISTIQ) 25 MG TB24 extended release tablet Take 25 mg by mouth daily       No current facility-administered medications for this visit.       Allergies   Allergen Reactions    Other      Other Irregular menses     2. Pelvic pain     3. Dyspareunia in female         MEDICATIONS:  No orders of the defined types were placed in this encounter. ORDERS:  No orders of the defined types were placed in this encounter. PLAN:  Diatherix pending  Ordered labs and TVUS  May need OCP- pt states understanding  May need boric acid for recurrent BV    Patient Instructions   Patient Education        Painful Sex: Care Instructions  Your Care Instructions     Painful sex can be caused by many things. You may have an injury, an infection, or a growth in your vagina. Or maybe you have muscle spasms. In some cases, the pain is caused by another medical condition, such as a spinal problem. Some medicines can cause dryness in the vagina. And as a woman gets older, her vagina gets drier. It may also narrow, shorten, and get stiffer. This dryness can make sex painful. Talk to your doctor about what might be causing your painful sex. Treatment may help. Follow-up care is a key part of your treatment and safety. Be sure to make and go to all appointments, and call your doctor if you are having problems. It's also a good idea to know your test results and keep a list of the medicines you take. How can you care for yourself at home? · Use a vaginal lubricant during sex. Examples are Astroglide, K-Y Jelly, and Wet Gel Lubricant. · Increase the time you and your partner spend touching each other before sex. This is called foreplay. · Try different positions for sex to find the most comfortable ones. · Ask your doctor about exercises to strengthen and relax your pelvic muscles. · Before sex, take a warm bath. This can relax you and reduce anxiety. · If your doctor prescribes any medicines, take them exactly as prescribed. Call your doctor if you think you are having a problem with your medicine. When should you call for help?   Watch closely for changes in your health, and be sure to contact your doctor if you have any problems. Where can you learn more? Go to https://chpepiceweb.healthWatcher [email or web address] org and sign in to your FoneSenset account. Enter J136 in the Runner box to learn more about \"Painful Sex: Care Instructions. \"     If you do not have an account, please click on the \"Sign Up Now\" link. Current as of: November 8, 2019               Content Version: 12.6  © 4594-9809 [employer]orporated. Care instructions adapted under license by [facility] ([facility]). If you have questions about a medical condition or this instruction, always ask your healthcare professional. [address] any warranty or liability for your use of this information.

## 2020-11-13 NOTE — PATIENT INSTRUCTIONS
Patient Education        Painful Sex: Care Instructions  Your Care Instructions     Painful sex can be caused by many things. You may have an injury, an infection, or a growth in your vagina. Or maybe you have muscle spasms. In some cases, the pain is caused by another medical condition, such as a spinal problem. Some medicines can cause dryness in the vagina. And as a woman gets older, her vagina gets drier. It may also narrow, shorten, and get stiffer. This dryness can make sex painful. Talk to your doctor about what might be causing your painful sex. Treatment may help. Follow-up care is a key part of your treatment and safety. Be sure to make and go to all appointments, and call your doctor if you are having problems. It's also a good idea to know your test results and keep a list of the medicines you take. How can you care for yourself at home? · Use a vaginal lubricant during sex. Examples are Astroglide, K-Y Jelly, and Wet Gel Lubricant. · Increase the time you and your partner spend touching each other before sex. This is called foreplay. · Try different positions for sex to find the most comfortable ones. · Ask your doctor about exercises to strengthen and relax your pelvic muscles. · Before sex, take a warm bath. This can relax you and reduce anxiety. · If your doctor prescribes any medicines, take them exactly as prescribed. Call your doctor if you think you are having a problem with your medicine. When should you call for help? Watch closely for changes in your health, and be sure to contact your doctor if you have any problems. Where can you learn more? Go to https://vikram.BRAIN. org and sign in to your Syndevrx account. Enter Y137 in the Clean Air Power box to learn more about \"Painful Sex: Care Instructions. \"     If you do not have an account, please click on the \"Sign Up Now\" link.   Current as of: November 8, 2019               Content Version: 12.6  © 8571-5967 Healthwise, Incorporated. Care instructions adapted under license by [facility] ([facility]). If you have questions about a medical condition or this instruction, always ask your healthcare professional. [address] any warranty or liability for your use of this information.

## 2020-11-16 ENCOUNTER — TELEPHONE (OUTPATIENT)
Dept: OBGYN | Age: 25
End: 2020-11-16

## 2020-11-16 RX ORDER — METRONIDAZOLE 500 MG/1
500 TABLET ORAL 2 TIMES DAILY
Qty: 14 TABLET | Refills: 0 | Status: SHIPPED | OUTPATIENT
Start: 2020-11-16 | End: 2020-11-23

## 2020-11-16 RX ORDER — AZITHROMYCIN 500 MG/1
1000 TABLET, FILM COATED ORAL ONCE
Qty: 2 TABLET | Refills: 0 | Status: SHIPPED | OUTPATIENT
Start: 2020-11-16 | End: 2020-11-16

## 2020-11-24 ENCOUNTER — PATIENT MESSAGE (OUTPATIENT)
Dept: OBGYN | Age: 25
End: 2020-11-24

## 2020-11-24 RX ORDER — METRONIDAZOLE 7.5 MG/G
1 GEL VAGINAL DAILY
Qty: 1 TUBE | Refills: 0 | Status: SHIPPED | OUTPATIENT
Start: 2020-11-24 | End: 2020-11-29

## 2020-11-24 NOTE — TELEPHONE ENCOUNTER
From: Livia Soriano  To: VALE Toribio - CNP  Sent: 11/24/2020 8:29 AM CST  Subject: Prescription Question    I was told there was only oral medication for my BV. However, I have been given vaginal gel before for this recurrent BV. The antibiotics have made my nausea and IBS way worse and I cannot continue taking it orally. I did take the one that was 2 pills fully. Can I please have it for vaginal use so I can treat this?      Thank you,    Livia Soriano

## 2020-12-04 ENCOUNTER — TELEMEDICINE (OUTPATIENT)
Dept: OBGYN | Age: 25
End: 2020-12-04
Payer: COMMERCIAL

## 2020-12-04 PROCEDURE — G8428 CUR MEDS NOT DOCUMENT: HCPCS | Performed by: NURSE PRACTITIONER

## 2020-12-04 PROCEDURE — 99213 OFFICE O/P EST LOW 20 MIN: CPT | Performed by: NURSE PRACTITIONER

## 2020-12-04 RX ORDER — CLINDAMYCIN PHOSPHATE 20 MG/G
CREAM VAGINAL
Qty: 1 TUBE | Refills: 0 | Status: SHIPPED | OUTPATIENT
Start: 2020-12-04 | End: 2020-12-11

## 2020-12-04 ASSESSMENT — ENCOUNTER SYMPTOMS
GASTROINTESTINAL NEGATIVE: 1
DIARRHEA: 0
EYES NEGATIVE: 1
ALLERGIC/IMMUNOLOGIC NEGATIVE: 1
RESPIRATORY NEGATIVE: 1
CONSTIPATION: 0

## 2020-12-04 NOTE — PATIENT INSTRUCTIONS
Patient Education        Abdominal Pain: Care Instructions  Your Care Instructions     Abdominal pain has many possible causes. Some aren't serious and get better on their own in a few days. Others need more testing and treatment. If your pain continues or gets worse, you need to be rechecked and may need more tests to find out what is wrong. You may need surgery to correct the problem. Don't ignore new symptoms, such as fever, nausea and vomiting, urination problems, pain that gets worse, and dizziness. These may be signs of a more serious problem. Your doctor may have recommended a follow-up visit in the next 8 to 12 hours. If you are not getting better, you may need more tests or treatment. The doctor has checked you carefully, but problems can develop later. If you notice any problems or new symptoms, get medical treatment right away. Follow-up care is a key part of your treatment and safety. Be sure to make and go to all appointments, and call your doctor if you are having problems. It's also a good idea to know your test results and keep a list of the medicines you take. How can you care for yourself at home? · Rest until you feel better. · To prevent dehydration, drink plenty of fluids, enough so that your urine is light yellow or clear like water. Choose water and other caffeine-free clear liquids until you feel better. If you have kidney, heart, or liver disease and have to limit fluids, talk with your doctor before you increase the amount of fluids you drink. · If your stomach is upset, eat mild foods, such as rice, dry toast or crackers, bananas, and applesauce. Try eating several small meals instead of two or three large ones. · Wait until 48 hours after all symptoms have gone away before you have spicy foods, alcohol, and drinks that contain caffeine. · Do not eat foods that are high in fat. · Avoid anti-inflammatory medicines such as aspirin, ibuprofen (Advil, Motrin), and naproxen (Aleve). These can cause stomach upset. Talk to your doctor if you take daily aspirin for another health problem. When should you call for help? Call 911 anytime you think you may need emergency care. For example, call if:    · You passed out (lost consciousness).     · You pass maroon or very bloody stools.     · You vomit blood or what looks like coffee grounds.     · You have new, severe belly pain. Call your doctor now or seek immediate medical care if:    · Your pain gets worse, especially if it becomes focused in one area of your belly.     · You have a new or higher fever.     · Your stools are black and look like tar, or they have streaks of blood.     · You have unexpected vaginal bleeding.     · You have symptoms of a urinary tract infection. These may include:  ? Pain when you urinate. ? Urinating more often than usual.  ? Blood in your urine.     · You are dizzy or lightheaded, or you feel like you may faint. Watch closely for changes in your health, and be sure to contact your doctor if:    · You are not getting better after 1 day (24 hours). Where can you learn more? Go to [email or web address]://[email or web address].Taodyne. [email or web address] and sign in to your [email or web address] account. Enter Q980 in the Terralliance box to learn more about \"Abdominal Pain: Care Instructions. \"     If you do not have an account, please click on the \"Sign Up Now\" link. Current as of: June 26, 2019               Content Version: 12.6  © 2660-5456 Kere[employer], Incorporated. Care instructions adapted under license by [facility] ([facility]). If you have questions about a medical condition or this instruction, always ask your healthcare professional. [place] [address] any warranty or liability for your use of this information.

## 2020-12-04 NOTE — PROGRESS NOTES
2020    TELEHEALTH EVALUATION -- Audio/Visual (During CPFIF-82 public health emergency)    HPI:    Tejal Bowden (:  1995) has requested an audio/video evaluation for the following concern(s):    Pt presents for f/u on recurrent BV. Took Flagyl, Zithromax and Metrogel and noticed improvement with her pain and discharge. Pain improved with sex. Did not have pelvic u/s because she was feeling better. Still having some yellow discharge and mild discomfort. +ureaplasma on last swab. Review of Systems   Constitutional: Negative. HENT: Negative. Eyes: Negative. Respiratory: Negative. Cardiovascular: Negative. Gastrointestinal: Negative. Negative for constipation and diarrhea. Endocrine: Negative. Genitourinary: Positive for vaginal discharge and vaginal pain. Negative for frequency, menstrual problem and urgency. Musculoskeletal: Negative. Skin: Negative. Allergic/Immunologic: Negative. Neurological: Negative. Hematological: Negative. Psychiatric/Behavioral: Negative. All other systems reviewed and are negative. Prior to Visit Medications    Medication Sig Taking? Authorizing Provider   clindamycin (CLEOCIN) 2 % vaginal cream Place vaginally nightly x7 nights Yes VALE Bui - CNP   famotidine (PEPCID) 10 MG tablet Take 10 mg by mouth 2 times daily  Historical Provider, MD   desvenlafaxine succinate (PRISTIQ) 25 MG TB24 extended release tablet Take 25 mg by mouth daily  Historical Provider, MD       Social History     Tobacco Use    Smoking status: Never Smoker    Smokeless tobacco: Never Used   Substance Use Topics    Alcohol use: No    Drug use: No        Allergies   Allergen Reactions    Other      Other reaction(s):  Other (See Comments)  Band-Aids causes welps and rash    Percocet [Oxycodone-Acetaminophen] Rash    Prochlorperazine Rash   ,   Past Medical History:   Diagnosis Date    Anemia 2020    Anxiety     Anxiety disorder     Eczema     Andreas-Danlos syndrome 2017    Gastritis     Gastroparesis     GERD (gastroesophageal reflux disease)     History of chicken pox     IBS (irritable bowel syndrome)     Postural orthostatic tachycardia syndrome 2017    Stress incontinence 3/19/2019   ,   Past Surgical History:   Procedure Laterality Date     SECTION      x 1    CHOLECYSTECTOMY      GALLBLADDER SURGERY  2014    OTHER SURGICAL HISTORY  2018        TONSILLECTOMY     ,   Social History     Tobacco Use    Smoking status: Never Smoker    Smokeless tobacco: Never Used   Substance Use Topics    Alcohol use: No    Drug use: No       PHYSICAL EXAMINATION:  [ INSTRUCTIONS:  \"[x]\" Indicates a positive item  \"[]\" Indicates a negative item  -- DELETE ALL ITEMS NOT EXAMINED]  Vital Signs: (As obtained by patient/caregiver or practitioner observation)    Blood pressure-  Heart rate-    Respiratory rate-    Temperature-  Pulse oximetry-     Constitutional: [] Appears well-developed and well-nourished [] No apparent distress      [] Abnormal-   Mental status  [] Alert and awake  [] Oriented to person/place/time []Able to follow commands      Eyes:  EOM    []  Normal  [] Abnormal-  Sclera  []  Normal  [] Abnormal -         Discharge []  None visible  [] Abnormal -    HENT:   [] Normocephalic, atraumatic.   [] Abnormal   [] Mouth/Throat: Mucous membranes are moist.     External Ears [] Normal  [] Abnormal-     Neck: [] No visualized mass     Pulmonary/Chest: [] Respiratory effort normal.  [] No visualized signs of difficulty breathing or respiratory distress        [] Abnormal-      Musculoskeletal:   [] Normal gait with no signs of ataxia         [] Normal range of motion of neck        [] Abnormal-       Neurological:        [] No Facial Asymmetry (Cranial nerve 7 motor function) (limited exam to video visit)          [] No gaze palsy        [] Abnormal-         Skin:        [] No significant exanthematous lesions or discoloration noted on facial skin         [] Abnormal-            Psychiatric:       [] Normal Affect [] No Hallucinations        [] Abnormal-     Other pertinent observable physical exam findings-     ASSESSMENT/PLAN:  1. Carrier of ureaplasma urealyticum    2. Dyspareunia in female    3. Vaginal discharge      Plan Clindamycin vaginal. Pt has gastropareosis and has trouble with oral abx. Start boric acid for maintenance. F/u with any problems. Return if symptoms worsen or fail to improve. Real Palomino is a 27 y.o. female being evaluated by a Virtual Visit (video visit) encounter to address concerns as mentioned above. A caregiver was present when appropriate. Due to this being a TeleHealth encounter (During [facility] public health emergency), evaluation of the following organ systems was limited: Vitals/Constitutional/EENT/Resp/CV/GI//MS/Neuro/Skin/Heme-Lymph-Imm. Pursuant to the emergency declaration under the [address] [place] [address] and the [employer] and [employer] Act, this Virtual Visit was conducted with patient's (and/or legal guardian's) consent, to reduce the patient's risk of exposure to COVID-19 and provide necessary medical care. The patient (and/or legal guardian) has also been advised to contact this office for worsening conditions or problems, and seek emergency medical treatment and/or call 911 if deemed necessary. Patient identification was verified at the start of the visit: Yes    Total time spent on this encounter: 15 mins    Services were provided through a video synchronous discussion virtually to substitute for in-person clinic visit. Patient and provider were located at their individual homes. --Nadene Ahumada, APRN - CNP on 12/4/2020 at 9:54 AM    An electronic signature was used to authenticate this note.

## 2021-11-16 ENCOUNTER — OFFICE VISIT (OUTPATIENT)
Dept: CARDIOLOGY | Facility: CLINIC | Age: 26
End: 2021-11-16

## 2021-11-16 VITALS
RESPIRATION RATE: 18 BRPM | OXYGEN SATURATION: 99 % | DIASTOLIC BLOOD PRESSURE: 80 MMHG | BODY MASS INDEX: 30.44 KG/M2 | WEIGHT: 151 LBS | SYSTOLIC BLOOD PRESSURE: 125 MMHG | HEART RATE: 111 BPM | HEIGHT: 59 IN

## 2021-11-16 DIAGNOSIS — K31.84 GASTROPARESIS: ICD-10-CM

## 2021-11-16 DIAGNOSIS — R53.82 CHRONIC FATIGUE: ICD-10-CM

## 2021-11-16 DIAGNOSIS — I95.1 ORTHOSTATIC HYPOTENSION: ICD-10-CM

## 2021-11-16 DIAGNOSIS — R07.89 CHEST PAIN, ATYPICAL: Primary | ICD-10-CM

## 2021-11-16 DIAGNOSIS — R06.02 SHORTNESS OF BREATH: ICD-10-CM

## 2021-11-16 PROCEDURE — 99214 OFFICE O/P EST MOD 30 MIN: CPT | Performed by: NURSE PRACTITIONER

## 2021-11-16 PROCEDURE — 93000 ELECTROCARDIOGRAM COMPLETE: CPT | Performed by: NURSE PRACTITIONER

## 2021-11-16 RX ORDER — FAMOTIDINE 40 MG/1
40 TABLET, FILM COATED ORAL 2 TIMES DAILY
COMMUNITY

## 2021-11-16 RX ORDER — MONTELUKAST SODIUM 4 MG/1
1 TABLET, CHEWABLE ORAL 2 TIMES DAILY
COMMUNITY
End: 2022-08-08

## 2021-11-16 NOTE — PROGRESS NOTES
Subjective:     Encounter Date:11/16/2021      Patient ID: Nai Golden is a 25 y.o. female with known gastroparesis, postural orthostatic tachycardia syndrome, Samantha-Danlos Syndrome, chronic fatigue, who is followed closely by her PCP office and OB/GYN.  She presents today for evaluation of chronic fatigue.    Chief Complaint: Fatigue   History of Present Illness    Nai Golden presents today for cardiology evaluation at the request of her OB/GYN, Dr. Mcginnis.  The patient has previously been seen in our office due to her history of orthostatic hypotension.  She was initially referred to our office in June 2018 to establish care.  At that time, she was advised about avoiding dehydration, use of compression stockings, and possible referral to [place] for evaluation of the dysautonomia clinic based on her orthostatic hypotension, symptoms, and concern for postural orthostatic tachycardia syndrome.  She was last seen in August 2020 where she had noted some chest discomfort and had a treadmill stress test ordered which was felt to be low risk.    Since her last evaluation the patient reports that she still has ongoing episodes of the chest pressure that she described at that time.  She reports that the episodes occur at random and resolve on their own.  She continues with her known chronic symptoms of shortness of breath, dizziness, lightheadedness.  She reports her dyspnea is generally with activity.  She feels that her dizziness is worse recently.  She denies any syncope but reports episodes of near syncope from time to time.  She denies any palpitations and reports a fairly stable heart rate.  She has chronic fatigue which she feels has worsened.  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  She denies any significant swelling to  her feet or ankles.  She denies any significant weight gain.  She reports chronic bloating, daily nausea.  She estimates she has diarrhea 3-4 times a week.  She reports that vomiting is less frequent but occurs multiple times per month.  She reports hemorrhoids but denies any melena.  She has had to follow-up with her OB/GYN due to frequent periods, reporting a.  Generally every week and 1/2 to 2 weeks.  She was started on letrozole yesterday.  She denies any tobacco use, alcohol use, illicit drug use.  She reports new pain in her sternum different than the chest pressure that she has experienced for over a year.  She states that this pain is worsened with leaning forward.  She denies any back pain.  She denies any worsening of that pain with inspiration.    She reports difficulty in maintaining high volume status given her history of gastroparesis.  She is not overly concerned about any of her symptoms currently.    She tells me she was referred back to her PCP office for an EKG at the request of her OB/GYN.  This was accessed in the records and she had is a clear copy of this on her phone.  She reports this was reviewed by her OB/GYN and with her complaints of fatigue was notified to follow-up with cardiology.    She does report that she had lab work drawn by her OB/GYN which was all normal.  Access to the records indicate a normal TSH, T4, hemoglobin and hematocrit.    She was started on medication, letrozole, by her OB/GYN office due to her frequent periods.  She tells me that she started this medication yesterday.    The following portions of the patient's history were reviewed and updated as appropriate: allergies, current medications, past family history, past medical history, past social history and past surgical history.     Allergies   Allergen Reactions   • Compazine [Prochlorperazine Edisylate] Rash   • Other Other (See Comments)     Band-Aids causes welps and rash     • Percocet  "[Oxycodone-Acetaminophen] Rash       Current Outpatient Medications:   •  colestipol (Colestid) 1 g tablet, Take 1 g by mouth 2 (Two) Times a Day., Disp: , Rfl:   •  famotidine (Pepcid) 40 MG tablet, Take 40 mg by mouth Daily., Disp: , Rfl:   •  LETROZOLE PO, Take 2.5 mg by mouth Daily., Disp: , Rfl:   •  metFORMIN (GLUCOPHAGE) 500 MG tablet, Take 500 mg by mouth Daily With Breakfast., Disp: , Rfl:     Review of Systems   Constitutional: Positive for malaise/fatigue. Negative for diaphoresis, fever and weight gain.   Eyes: Negative for visual disturbance.   Cardiovascular: Positive for chest pain, dyspnea on exertion and near-syncope. Negative for leg swelling, orthopnea, palpitations, paroxysmal nocturnal dyspnea and syncope.   Respiratory: Positive for shortness of breath. Negative for cough and wheezing.    Hematologic/Lymphatic: Negative for bleeding problem.   Musculoskeletal: Negative for back pain.   Gastrointestinal: Positive for bloating, diarrhea, hemorrhoids, nausea and vomiting. Negative for abdominal pain and melena.   Neurological: Positive for dizziness, light-headedness and weakness.   Psychiatric/Behavioral: Negative for altered mental status. The patient is nervous/anxious.          ECG 12 Lead    Date/Time: 11/16/2021 10:01 AM  Performed by: Beckie Vail APRN  Authorized by: Beckie Vail APRN   Comparison: compared with previous ECG from 8/19/2020  Similar to previous ECG  Rhythm: sinus tachycardia  Rate: tachycardic  BPM: 111  Conduction: conduction normal  ST Segments: ST segments normal  T Waves: T waves normal            /80 (BP Location: Right arm, Patient Position: Sitting, Cuff Size: Adult)   Pulse 111   Resp 18   Ht 149.9 cm (59\")   Wt 68.5 kg (151 lb)   SpO2 99%   Breastfeeding No   BMI 30.50 kg/m²        Objective:     Vitals reviewed.   Constitutional:       General: Not in acute distress.     Appearance: Normal and healthy appearance. Well-developed, well-groomed " and not in distress. Obese. Not ill-appearing.   HENT:      Head: Normocephalic and atraumatic.   Pulmonary:      Effort: Pulmonary effort is normal.      Breath sounds: Normal breath sounds.   Chest:      Chest wall: Not tender to palpatation.   Cardiovascular:      PMI at left midclavicular line. Tachycardia present. Regular rhythm.      Murmurs: There is no murmur.      No gallop. No rub.   Edema:     Peripheral edema absent.   Abdominal:      General: Bowel sounds are normal.      Palpations: Abdomen is soft.   Musculoskeletal:      Cervical back: Normal range of motion and neck supple. Skin:     General: Skin is warm and dry.   Neurological:      Mental Status: Alert, oriented to person, place, and time and oriented to person, place and time.   Psychiatric:         Attention and Perception: Attention normal.         Mood and Affect: Mood normal.         Speech: Speech normal.         Behavior: Behavior is cooperative.         Cognition and Memory: Cognition and memory normal.       Lab Review:   Results for orders placed in visit on 04/13/17    Adult Transthoracic Echo Complete    Interpretation Summary  · Left ventricular systolic function is normal.  · Calculated EF = 59.3%  · All left ventricular wall segments contract normally  · Left ventricular diastolic function is normal        Assessment:          Diagnosis Plan   1. Chest pain, atypical  Adult Transthoracic Echo Complete W/ Cont if Necessary Per Protocol   2. Shortness of breath  Adult Transthoracic Echo Complete W/ Cont if Necessary Per Protocol   3. Chronic fatigue  Adult Transthoracic Echo Complete W/ Cont if Necessary Per Protocol   4. Gastroparesis     5. Orthostatic hypotension            Plan:       -Chest pain: The patient reports ongoing chest pressure which she had reported at her previous visit in August 2020.  She states that that pressure has never completely subsided but is no worse.  In addition to this she now reports chest pain that  "she refers to as \"sternal pain\".  She reports that this is present in her mid chest area, mild to moderate intensity, occurring at random times and worse when leaning forward.  She denies any worsening of the symptom with inspiration or expiration.  She denies any pain while lying down or shortness of breath.  She thinks that this may be related to her recent Covid vaccination.    -Shortness of breath: Chronic symptom, stable.    -Chronic fatigue: Chronic symptom, slightly worse.  She is uncertain as to why she has noticed increased fatigue of recent.  She has had lab work drawn by her OB/GYN.  This is all been relatively normal.  She was advised to follow-up with cardiology.  There was some notation in her OB/GYN records regarding echocardiogram.  We discussed this test today.  The patient states that she is not overly concerned about any of her symptoms at this time and is concerned about the affordability of an echocardiogram.  She was going to check with her insurance regarding cost.  We will place this order today.      -Gastroparesis: Chronic.  The patient reports daily nausea and frequent vomiting.  She reports multiple episodes of diarrhea.  Due to her gastroparesis she struggles with adequate hydration for treatment of her chronic dizziness and orthostatic hypotension.    -Orthostatic hypotension: The patient has previously been seen for orthostatic hypotension and tells me that she has been diagnosed with POTS. She has never been formally seen in a dysautonomia clinic but has managed symptoms on her own.            I spent quite some time discussing the patient's symptoms with her today.  Obtaining an echocardiogram is reasonable however given her age and current complaints I suspect that her echo will be stable as it was in 2017.  He does not have any symptoms consistent with congestive heart failure.  She has not had any recent events with acute symptoms.  Her most recent change is the addition of " "\"sternal pain\" which may be related to a Covid vaccination based on the patient's history.  I discussed with the patient her history of POTS and that she may benefit from further evaluation at a dysautonomia clinic.  We discussed financial restrictions, including travel, which may make this difficult on the patient.  I suggested ongoing management with oral hydration, lower extremity compression.  It should also be noted that she has recently started a medication help with her recent complaint of frequent periods.  This medication does list several common reactions including headache, dizziness, diaphoresis, bone pain, dyspnea, fatigue, nausea.  Worsening of the symptoms felt to be related to her new medication should be discussed with her OB/GYN.  We will follow up with the patient based on the results of her echocardiogram.     "

## 2021-12-30 ENCOUNTER — HOSPITAL ENCOUNTER (OUTPATIENT)
Dept: CARDIOLOGY | Facility: HOSPITAL | Age: 26
Discharge: HOME | End: 2021-12-30
Admitting: NURSE PRACTITIONER

## 2021-12-30 VITALS
WEIGHT: 151 LBS | SYSTOLIC BLOOD PRESSURE: 125 MMHG | BODY MASS INDEX: 30.44 KG/M2 | DIASTOLIC BLOOD PRESSURE: 80 MMHG | HEIGHT: 59 IN

## 2021-12-30 DIAGNOSIS — R06.02 SHORTNESS OF BREATH: ICD-10-CM

## 2021-12-30 DIAGNOSIS — R07.89 CHEST PAIN, ATYPICAL: ICD-10-CM

## 2021-12-30 DIAGNOSIS — R53.82 CHRONIC FATIGUE: ICD-10-CM

## 2021-12-30 PROCEDURE — 93306 TTE W/DOPPLER COMPLETE: CPT | Performed by: INTERNAL MEDICINE

## 2021-12-30 PROCEDURE — 93306 TTE W/DOPPLER COMPLETE: CPT

## 2022-01-03 LAB
BH CV ECHO MEAS - LAT PEAK E' VEL: 17.8 CM/SEC
BH CV ECHO MEAS - MED PEAK E' VEL: 11.3 CM/SEC
LEFT ATRIUM VOLUME INDEX: 13.4 ML/M2
LEFT ATRIUM VOLUME: 21.9 CM3
MAXIMAL PREDICTED HEART RATE: 196 BPM
STRESS TARGET HR: 167 BPM

## 2022-01-04 ENCOUNTER — APPOINTMENT (OUTPATIENT)
Dept: CARDIOLOGY | Facility: HOSPITAL | Age: 27
End: 2022-01-04

## 2022-08-05 ENCOUNTER — TELEPHONE (OUTPATIENT)
Dept: VASCULAR SURGERY | Facility: CLINIC | Age: 27
End: 2022-08-05

## 2022-08-08 ENCOUNTER — OFFICE VISIT (OUTPATIENT)
Dept: VASCULAR SURGERY | Facility: CLINIC | Age: 27
End: 2022-08-08

## 2022-08-08 ENCOUNTER — PATIENT ROUNDING (BHMG ONLY) (OUTPATIENT)
Dept: VASCULAR SURGERY | Facility: CLINIC | Age: 27
End: 2022-08-08

## 2022-08-08 VITALS
BODY MASS INDEX: 27.42 KG/M2 | WEIGHT: 136 LBS | RESPIRATION RATE: 16 BRPM | HEART RATE: 100 BPM | DIASTOLIC BLOOD PRESSURE: 66 MMHG | HEIGHT: 59 IN | OXYGEN SATURATION: 99 % | SYSTOLIC BLOOD PRESSURE: 98 MMHG

## 2022-08-08 DIAGNOSIS — Z87.898 HISTORY OF DIFFICULT VENOUS ACCESS: ICD-10-CM

## 2022-08-08 DIAGNOSIS — K31.84 GASTROPARESIS: Primary | ICD-10-CM

## 2022-08-08 DIAGNOSIS — Q79.60 EHLERS-DANLOS SYNDROME: ICD-10-CM

## 2022-08-08 PROBLEM — Z13.220 ENCOUNTER FOR LIPID SCREENING FOR CARDIOVASCULAR DISEASE: Status: ACTIVE | Noted: 2022-08-08

## 2022-08-08 PROBLEM — E55.9 VITAMIN D DEFICIENCY: Status: ACTIVE | Noted: 2022-08-08

## 2022-08-08 PROBLEM — D64.9 ANEMIA: Status: ACTIVE | Noted: 2020-02-19

## 2022-08-08 PROBLEM — N92.6 IRREGULAR MENSTRUAL CYCLE: Status: ACTIVE | Noted: 2022-08-08

## 2022-08-08 PROBLEM — R03.0 ELEVATED BLOOD PRESSURE READING WITHOUT DIAGNOSIS OF HYPERTENSION: Status: ACTIVE | Noted: 2022-08-08

## 2022-08-08 PROBLEM — R63.1 EXCESSIVE THIRST: Status: ACTIVE | Noted: 2022-08-08

## 2022-08-08 PROBLEM — R10.9 FLANK PAIN: Status: ACTIVE | Noted: 2022-08-08

## 2022-08-08 PROBLEM — R51.9 HEADACHE: Status: ACTIVE | Noted: 2020-03-11

## 2022-08-08 PROBLEM — E78.5 HYPERLIPIDEMIA: Status: ACTIVE | Noted: 2022-08-08

## 2022-08-08 PROBLEM — Z13.6 ENCOUNTER FOR LIPID SCREENING FOR CARDIOVASCULAR DISEASE: Status: ACTIVE | Noted: 2022-08-08

## 2022-08-08 PROBLEM — R10.2 PELVIC AND PERINEAL PAIN: Status: ACTIVE | Noted: 2022-08-08

## 2022-08-08 PROCEDURE — 99204 OFFICE O/P NEW MOD 45 MIN: CPT | Performed by: SURGERY

## 2022-08-08 NOTE — H&P
HPI  I had the pleasure of seeing your patient Nai Golden in the office today.  Thank you kindly for this consultation.  As you recall, Nai Golden is a 26 y.o.  female who you are currently following for general medical care.  She is referred to the vascular surgery office today for evaluation of possible Port-A-Cath insertion.  She has a history of Samantha-Danlos syndrome, and chronic gastroparesis.  She has previously required serial IV fluid administration due to her gastroparesis and inability to properly take oral intake.  There have been issues with venous access in the past given the ongoing need for continued fluid administration on a weekly basis port has been requested.  She has not previously had a port before.  She otherwise has no significant active complaints today.    Past Medical History:   Diagnosis Date   • Anemia    • Depression    • Dyspepsia    • Samantha-Danlos syndrome     2018   • Gastroparesis    • GERD (gastroesophageal reflux disease)    • IBS (irritable bowel syndrome)    • Scoliosis deformity of spine 2017   • Sinus tachycardia    • Tachycardia        Past Surgical History:   Procedure Laterality Date   •  SECTION     • CHOLECYSTECTOMY     • COLONOSCOPY N/A 2020    Procedure: COLONOSCOPY WITH ANESTHESIA;  Surgeon: Ronaldo Cole DO;  Location: [facility] ENDOSCOPY;  Service: Gastroenterology;  Laterality: N/A;  preop; altered bowel  postop; normal   PCP Ju Garibay,    • ENDOSCOPY  2016   • ENDOSCOPY N/A 2017    Procedure: ESOPHAGOGASTRODUODENOSCOPY possible dilation ;  Surgeon: Franky Berman MD;  Location: [facility] ENDOSCOPY;  Service:    • TONSILLECTOMY         Family History   Problem Relation Age of Onset   • Hypertension Mother    • Hypertension Father    • Arthritis Father    • Hyperlipidemia Father    • Heart disease Father    • Migraines Father    • No Known Problems Sister    • No Known Problems Brother    • Diabetes Paternal  Grandfather    • Hypertension Paternal Grandfather    • No Known Problems Sister    • No Known Problems Sister    • Seizures Nephew    • Colon polyps Maternal Grandmother    • Colon cancer Neg Hx    • Esophageal cancer Neg Hx    • Stroke Neg Hx        Social History     Socioeconomic History   • Marital status:    Tobacco Use   • Smoking status: Never Smoker   • Smokeless tobacco: Never Used   Vaping Use   • Vaping Use: Never used   Substance and Sexual Activity   • Alcohol use: No   • Drug use: No   • Sexual activity: Yes     Partners: Male       Allergies   Allergen Reactions   • Adhesive Tape Rash   • Compazine [Prochlorperazine Edisylate] Rash   • Other Other (See Comments)     Band-Aids causes welps and rash     • Percocet [Oxycodone-Acetaminophen] Rash       Prior to Admission medications    Medication Sig Start Date End Date Taking? Authorizing Provider   cyanocobalamin 1000 MCG/ML injection Inject 1 mL into the appropriate muscle as directed by prescriber Every 14 (Fourteen) Days. 7/29/22  Yes    cyanocobalamin 1000 MCG/ML injection Inject 1 mL into the appropriate muscle as directed by prescriber Every 14 (Fourteen) Days. 7/29/22  Yes    famotidine (PEPCID) 40 MG tablet Take 40 mg by mouth Daily.   Yes Misti Holloway MD   Galcanezumab-gnlm (Emgality) 120 MG/ML solution prefilled syringe Inject 1 mL under the skin into the appropriate area as directed Every 30 (Thirty) Days. 7/19/22  Yes    NON FORMULARY 10 mg 4 (Four) Times a Day. Domperidone   Yes ProviderMisti MD   ondansetron (ZOFRAN) 8 MG tablet Take 1 tablet by mouth Every 6-8 Hours As Needed for nausea/vomiting. 3/22/22  Yes    Plecanatide (Trulance) 3 MG tablet Take 1 tablet by mouth Daily. 8/1/22  Yes    ubrogepant (ubrogepant) 100 MG tablet Take 1 tablet by mouth once daily. May take 2nd dose at least 2 hours after first dose as needed. **Max 2 tabs (200mg) in 24 hours** 8/1/22  Yes    colestipol (Colestid) 1 g tablet Take 1 g  "by mouth 2 (Two) Times a Day.  8/8/22  ProviderMisti MD   LETROZOLE PO Take 2.5 mg by mouth Daily.  8/8/22  Misti Holloway MD   lubiprostone (Amitiza) 24 MCG capsule Take 1 capsule by mouth 2 (Two) Times a Day With food and water 7/19/22 8/8/22     metFORMIN (GLUCOPHAGE) 500 MG tablet Take 500 mg by mouth Daily With Breakfast.  8/8/22  ProviderMisti MD       Review of Systems   Constitutional: Negative.  Negative for activity change, appetite change, chills, diaphoresis, fatigue and fever.   HENT: Negative.  Negative for congestion, sneezing, sore throat and trouble swallowing.    Eyes: Negative.  Negative for visual disturbance.   Respiratory: Negative.  Negative for chest tightness and shortness of breath.    Cardiovascular: Negative.  Negative for chest pain, palpitations and leg swelling.   Gastrointestinal: Negative.  Negative for abdominal distention, abdominal pain, nausea and vomiting.   Endocrine: Negative.    Genitourinary: Negative.    Musculoskeletal: Negative.    Skin: Negative.    Allergic/Immunologic: Negative.    Neurological: Negative.    Hematological: Negative.    Psychiatric/Behavioral: Negative.        BP 98/66 (BP Location: Left arm, Patient Position: Sitting, Cuff Size: Adult)   Pulse 100   Resp 16   Ht 149.9 cm (59\")   Wt 61.7 kg (136 lb)   SpO2 99%   BMI 27.47 kg/m²   Physical Exam  Constitutional:       Appearance: She is well-developed.   HENT:      Head: Normocephalic and atraumatic.      Nose: Nose normal.      Mouth/Throat:      Mouth: Mucous membranes are moist.   Eyes:      Pupils: Pupils are equal, round, and reactive to light.   Cardiovascular:      Rate and Rhythm: Normal rate and regular rhythm.      Pulses:           Carotid pulses are 2+ on the right side and 2+ on the left side.       Radial pulses are 2+ on the right side and 2+ on the left side.        Femoral pulses are 2+ on the right side and 2+ on the left side.       Popliteal pulses are 2+ " on the right side and 2+ on the left side.        Dorsalis pedis pulses are 2+ on the right side and 2+ on the left side.        Posterior tibial pulses are 2+ on the right side and 2+ on the left side.   Pulmonary:      Effort: Pulmonary effort is normal.   Abdominal:      General: There is no distension.      Palpations: Abdomen is soft. There is no mass.      Tenderness: There is no abdominal tenderness.   Musculoskeletal:         General: Normal range of motion.      Cervical back: Normal range of motion and neck supple.   Skin:     General: Skin is warm and dry.      Capillary Refill: Capillary refill takes less than 2 seconds.   Neurological:      Mental Status: She is alert and oriented to person, place, and time.   Psychiatric:         Behavior: Behavior normal.         Thought Content: Thought content normal.         Judgment: Judgment normal.         No results found.    Patient Active Problem List   Diagnosis   • Chronic fatigue   • Gastroparesis   • Orthostatic hypotension   • Nausea   • Chest pain, atypical   • Tachycardia   • Altered bowel function   • Shortness of breath   • Anemia   • Samantha-Danlos syndrome   • Elevated blood pressure reading without diagnosis of hypertension   • Encounter for lipid screening for cardiovascular disease   • Excessive thirst   • Flank pain   • Headache   • Hyperlipidemia   • Irregular menstrual cycle   • Pelvic and perineal pain   • Vitamin D deficiency         ICD-10-CM ICD-9-CM   1. Gastroparesis  K31.84 536.3   2. Samantha-Danlos syndrome  Q79.60 756.83   3. History of difficult venous access  Z87.898 V13.89       Lab Frequency Next Occurrence       Plan: After thoroughly evaluating Nai Golden, I believe the best course of action is to proceed with Port-A-Cath placement in the OR.  Patient has history of gastroparesis and requires serial administration of IV fluids.  She has had issues with IV access in the past making it difficult for this.  As such I think it  is appropriate to proceed with Port-A-Cath placement.  However we did have a long discussion today given her young age that with chronic port placement she has increased risk of central venous stenosis and occlusion which could lead to future SVC syndrome with upper extremity and facial swelling and other issues.  There is also risk of thrombus with port placement as well as risk of port infection which would necessitate removal.  She understands all of these things given the need for ongoing IV fluid administration from her gastroparesis and difficult IV access still wishes to proceed with a port. The risks and benefits were explained at great length to the patient which include but are not limited to bleeding, infection, vessel damage, nerve damage and pneumothorax. The patient understands the risks and wishes for me to proceed.  The patient can continue taking their current medication regimen as previously planned.  She otherwise has no significant vascular risk factors. BMI is >= 25 and <30. (Overweight) The following options were offered after discussion;: exercise counseling/recommendations and nutrition counseling/recommendations.  This was all discussed in full with complete understanding.

## 2022-08-08 NOTE — H&P (VIEW-ONLY)
HPI  I had the pleasure of seeing your patient Nai Golden in the office today.  Thank you kindly for this consultation.  As you recall, Nai Golden is a 26 y.o.  female who you are currently following for general medical care.  She is referred to the vascular surgery office today for evaluation of possible Port-A-Cath insertion.  She has a history of Samantha-Danlos syndrome, and chronic gastroparesis.  She has previously required serial IV fluid administration due to her gastroparesis and inability to properly take oral intake.  There have been issues with venous access in the past given the ongoing need for continued fluid administration on a weekly basis port has been requested.  She has not previously had a port before.  She otherwise has no significant active complaints today.    Past Medical History:   Diagnosis Date   • Anemia    • Depression    • Dyspepsia    • Samantha-Danlos syndrome     2018   • Gastroparesis    • GERD (gastroesophageal reflux disease)    • IBS (irritable bowel syndrome)    • Scoliosis deformity of spine 2017   • Sinus tachycardia    • Tachycardia        Past Surgical History:   Procedure Laterality Date   •  SECTION     • CHOLECYSTECTOMY     • COLONOSCOPY N/A 2020    Procedure: COLONOSCOPY WITH ANESTHESIA;  Surgeon: Ronaldo Cole DO;  Location: [facility] ENDOSCOPY;  Service: Gastroenterology;  Laterality: N/A;  preop; altered bowel  postop; normal   PCP Ju Garibay,    • ENDOSCOPY  2016   • ENDOSCOPY N/A 2017    Procedure: ESOPHAGOGASTRODUODENOSCOPY possible dilation ;  Surgeon: Franky Berman MD;  Location: [facility] ENDOSCOPY;  Service:    • TONSILLECTOMY         Family History   Problem Relation Age of Onset   • Hypertension Mother    • Hypertension Father    • Arthritis Father    • Hyperlipidemia Father    • Heart disease Father    • Migraines Father    • No Known Problems Sister    • No Known Problems Brother    • Diabetes Paternal  Grandfather    • Hypertension Paternal Grandfather    • No Known Problems Sister    • No Known Problems Sister    • Seizures Nephew    • Colon polyps Maternal Grandmother    • Colon cancer Neg Hx    • Esophageal cancer Neg Hx    • Stroke Neg Hx        Social History     Socioeconomic History   • Marital status:    Tobacco Use   • Smoking status: Never Smoker   • Smokeless tobacco: Never Used   Vaping Use   • Vaping Use: Never used   Substance and Sexual Activity   • Alcohol use: No   • Drug use: No   • Sexual activity: Yes     Partners: Male       Allergies   Allergen Reactions   • Adhesive Tape Rash   • Compazine [Prochlorperazine Edisylate] Rash   • Other Other (See Comments)     Band-Aids causes welps and rash     • Percocet [Oxycodone-Acetaminophen] Rash       Prior to Admission medications    Medication Sig Start Date End Date Taking? Authorizing Provider   cyanocobalamin 1000 MCG/ML injection Inject 1 mL into the appropriate muscle as directed by prescriber Every 14 (Fourteen) Days. 7/29/22  Yes    cyanocobalamin 1000 MCG/ML injection Inject 1 mL into the appropriate muscle as directed by prescriber Every 14 (Fourteen) Days. 7/29/22  Yes    famotidine (PEPCID) 40 MG tablet Take 40 mg by mouth Daily.   Yes Misti Holloway MD   Galcanezumab-gnlm (Emgality) 120 MG/ML solution prefilled syringe Inject 1 mL under the skin into the appropriate area as directed Every 30 (Thirty) Days. 7/19/22  Yes    NON FORMULARY 10 mg 4 (Four) Times a Day. Domperidone   Yes ProviderMisti MD   ondansetron (ZOFRAN) 8 MG tablet Take 1 tablet by mouth Every 6-8 Hours As Needed for nausea/vomiting. 3/22/22  Yes    Plecanatide (Trulance) 3 MG tablet Take 1 tablet by mouth Daily. 8/1/22  Yes    ubrogepant (ubrogepant) 100 MG tablet Take 1 tablet by mouth once daily. May take 2nd dose at least 2 hours after first dose as needed. **Max 2 tabs (200mg) in 24 hours** 8/1/22  Yes    colestipol (Colestid) 1 g tablet Take 1 g  "by mouth 2 (Two) Times a Day.  8/8/22  ProviderMisti MD   LETROZOLE PO Take 2.5 mg by mouth Daily.  8/8/22  Misti Holloway MD   lubiprostone (Amitiza) 24 MCG capsule Take 1 capsule by mouth 2 (Two) Times a Day With food and water 7/19/22 8/8/22     metFORMIN (GLUCOPHAGE) 500 MG tablet Take 500 mg by mouth Daily With Breakfast.  8/8/22  ProviderMisti MD       Review of Systems   Constitutional: Negative.  Negative for activity change, appetite change, chills, diaphoresis, fatigue and fever.   HENT: Negative.  Negative for congestion, sneezing, sore throat and trouble swallowing.    Eyes: Negative.  Negative for visual disturbance.   Respiratory: Negative.  Negative for chest tightness and shortness of breath.    Cardiovascular: Negative.  Negative for chest pain, palpitations and leg swelling.   Gastrointestinal: Negative.  Negative for abdominal distention, abdominal pain, nausea and vomiting.   Endocrine: Negative.    Genitourinary: Negative.    Musculoskeletal: Negative.    Skin: Negative.    Allergic/Immunologic: Negative.    Neurological: Negative.    Hematological: Negative.    Psychiatric/Behavioral: Negative.        BP 98/66 (BP Location: Left arm, Patient Position: Sitting, Cuff Size: Adult)   Pulse 100   Resp 16   Ht 149.9 cm (59\")   Wt 61.7 kg (136 lb)   SpO2 99%   BMI 27.47 kg/m²   Physical Exam  Constitutional:       Appearance: She is well-developed.   HENT:      Head: Normocephalic and atraumatic.      Nose: Nose normal.      Mouth/Throat:      Mouth: Mucous membranes are moist.   Eyes:      Pupils: Pupils are equal, round, and reactive to light.   Cardiovascular:      Rate and Rhythm: Normal rate and regular rhythm.      Pulses:           Carotid pulses are 2+ on the right side and 2+ on the left side.       Radial pulses are 2+ on the right side and 2+ on the left side.        Femoral pulses are 2+ on the right side and 2+ on the left side.       Popliteal pulses are 2+ " on the right side and 2+ on the left side.        Dorsalis pedis pulses are 2+ on the right side and 2+ on the left side.        Posterior tibial pulses are 2+ on the right side and 2+ on the left side.   Pulmonary:      Effort: Pulmonary effort is normal.   Abdominal:      General: There is no distension.      Palpations: Abdomen is soft. There is no mass.      Tenderness: There is no abdominal tenderness.   Musculoskeletal:         General: Normal range of motion.      Cervical back: Normal range of motion and neck supple.   Skin:     General: Skin is warm and dry.      Capillary Refill: Capillary refill takes less than 2 seconds.   Neurological:      Mental Status: She is alert and oriented to person, place, and time.   Psychiatric:         Behavior: Behavior normal.         Thought Content: Thought content normal.         Judgment: Judgment normal.         No results found.    Patient Active Problem List   Diagnosis   • Chronic fatigue   • Gastroparesis   • Orthostatic hypotension   • Nausea   • Chest pain, atypical   • Tachycardia   • Altered bowel function   • Shortness of breath   • Anemia   • Samantha-Danlos syndrome   • Elevated blood pressure reading without diagnosis of hypertension   • Encounter for lipid screening for cardiovascular disease   • Excessive thirst   • Flank pain   • Headache   • Hyperlipidemia   • Irregular menstrual cycle   • Pelvic and perineal pain   • Vitamin D deficiency         ICD-10-CM ICD-9-CM   1. Gastroparesis  K31.84 536.3   2. Samantha-Danlos syndrome  Q79.60 756.83   3. History of difficult venous access  Z87.898 V13.89       Lab Frequency Next Occurrence       Plan: After thoroughly evaluating Nai Golden, I believe the best course of action is to proceed with Port-A-Cath placement in the OR.  Patient has history of gastroparesis and requires serial administration of IV fluids.  She has had issues with IV access in the past making it difficult for this.  As such I think it  is appropriate to proceed with Port-A-Cath placement.  However we did have a long discussion today given her young age that with chronic port placement she has increased risk of central venous stenosis and occlusion which could lead to future SVC syndrome with upper extremity and facial swelling and other issues.  There is also risk of thrombus with port placement as well as risk of port infection which would necessitate removal.  She understands all of these things given the need for ongoing IV fluid administration from her gastroparesis and difficult IV access still wishes to proceed with a port. The risks and benefits were explained at great length to the patient which include but are not limited to bleeding, infection, vessel damage, nerve damage and pneumothorax. The patient understands the risks and wishes for me to proceed.  The patient can continue taking their current medication regimen as previously planned.  She otherwise has no significant vascular risk factors. BMI is >= 25 and <30. (Overweight) The following options were offered after discussion;: exercise counseling/recommendations and nutrition counseling/recommendations.  This was all discussed in full with complete understanding.

## 2022-08-08 NOTE — PROGRESS NOTES
August 8, 2022    Hello, may I speak with Nai Golden?    My name is Laurie      I am  with [facility]ULAR SUR[facility]ERY  [address], [address]  [place] [address] [phone].    Before we get started may I verify your date of birth? 1995    I am calling to officially welcome you to our practice and ask about your recent visit. Is this a good time to talk? yes    Tell me about your visit with us. What things went well?  The visit went very well.     We're always looking for ways to make our patients' experiences even better. Do you have recommendations on ways we may improve?  no    Overall were you satisfied with your first visit to our practice? yes       I appreciate you taking the time to speak with me today. Is there anything else I can do for you? no      Thank you, and have a great day.

## 2022-08-08 NOTE — PROGRESS NOTES
2022      Ju Garibay APRN  [address] [place] [address]    Nai Golden  1995    Chief Complaint   Patient presents with   • Establish Care     Referral by TERRA Silva for Gastroparesis.  Pt has Never Smoked.  Pt denies any stroke like symptoms.       Dear PREETHI Silva*:      HPI  I had the pleasure of seeing your patient Nai Golden in the office today.  Thank you kindly for this consultation.  As you recall, Nai Golden is a 26 y.o.  female who you are currently following for general medical care.  She is referred to the vascular surgery office today for evaluation of possible Port-A-Cath insertion.  She has a history of Samantha-Danlos syndrome, and chronic gastroparesis.  She has previously required serial IV fluid administration due to her gastroparesis and inability to properly take oral intake.  There have been issues with venous access in the past given the ongoing need for continued fluid administration on a weekly basis port has been requested.  She has not previously had a port before.  She otherwise has no significant active complaints today.    Past Medical History:   Diagnosis Date   • Anemia    • Depression    • Dyspepsia    • Smaantha-Danlos syndrome     2018   • Gastroparesis    • GERD (gastroesophageal reflux disease)    • IBS (irritable bowel syndrome)    • Scoliosis deformity of spine 2017   • Sinus tachycardia    • Tachycardia        Past Surgical History:   Procedure Laterality Date   •  SECTION     • CHOLECYSTECTOMY     • COLONOSCOPY N/A 2020    Procedure: COLONOSCOPY WITH ANESTHESIA;  Surgeon: Ronaldo Cole, DO;  Location: [facility] ENDOSCOPY;  Service: Gastroenterology;  Laterality: N/A;  preop; altered bowel  postop; normal   PCP Ju Garibay,    • ENDOSCOPY  2016   • ENDOSCOPY N/A 2017    Procedure: ESOPHAGOGASTRODUODENOSCOPY possible dilation ;  Surgeon: Franky Berman MD;   Location: [facility] ENDOSCOPY;  Service:    • TONSILLECTOMY         Family History   Problem Relation Age of Onset   • Hypertension Mother    • Hypertension Father    • Arthritis Father    • Hyperlipidemia Father    • Heart disease Father    • Migraines Father    • No Known Problems Sister    • No Known Problems Brother    • Diabetes Paternal Grandfather    • Hypertension Paternal Grandfather    • No Known Problems Sister    • No Known Problems Sister    • Seizures Nephew    • Colon polyps Maternal Grandmother    • Colon cancer Neg Hx    • Esophageal cancer Neg Hx    • Stroke Neg Hx        Social History     Socioeconomic History   • Marital status:    Tobacco Use   • Smoking status: Never Smoker   • Smokeless tobacco: Never Used   Vaping Use   • Vaping Use: Never used   Substance and Sexual Activity   • Alcohol use: No   • Drug use: No   • Sexual activity: Yes     Partners: Male       Allergies   Allergen Reactions   • Adhesive Tape Rash   • Compazine [Prochlorperazine Edisylate] Rash   • Other Other (See Comments)     Band-Aids causes welps and rash     • Percocet [Oxycodone-Acetaminophen] Rash       Prior to Admission medications    Medication Sig Start Date End Date Taking? Authorizing Provider   cyanocobalamin 1000 MCG/ML injection Inject 1 mL into the appropriate muscle as directed by prescriber Every 14 (Fourteen) Days. 7/29/22  Yes    cyanocobalamin 1000 MCG/ML injection Inject 1 mL into the appropriate muscle as directed by prescriber Every 14 (Fourteen) Days. 7/29/22  Yes    famotidine (PEPCID) 40 MG tablet Take 40 mg by mouth Daily.   Yes Misti Holloway MD   Galcanezumab-gnlm (Emgality) 120 MG/ML solution prefilled syringe Inject 1 mL under the skin into the appropriate area as directed Every 30 (Thirty) Days. 7/19/22  Yes    NON FORMULARY 10 mg 4 (Four) Times a Day. Domperidone   Yes Misti Holloway MD   ondansetron (ZOFRAN) 8 MG tablet Take 1 tablet by mouth Every 6-8 Hours As Needed  "for nausea/vomiting. 3/22/22  Yes    Plecanatide (Trulance) 3 MG tablet Take 1 tablet by mouth Daily. 8/1/22  Yes    ubrogepant (ubrogepant) 100 MG tablet Take 1 tablet by mouth once daily. May take 2nd dose at least 2 hours after first dose as needed. **Max 2 tabs (200mg) in 24 hours** 8/1/22  Yes    colestipol (Colestid) 1 g tablet Take 1 g by mouth 2 (Two) Times a Day.  8/8/22  Provider, MD Misti   LETROZOLE PO Take 2.5 mg by mouth Daily.  8/8/22  ProviderMisti MD   lubiprostone (Amitiza) 24 MCG capsule Take 1 capsule by mouth 2 (Two) Times a Day With food and water 7/19/22 8/8/22     metFORMIN (GLUCOPHAGE) 500 MG tablet Take 500 mg by mouth Daily With Breakfast.  8/8/22  ProviderMisti MD       Review of Systems   Constitutional: Negative.  Negative for activity change, appetite change, chills, diaphoresis, fatigue and fever.   HENT: Negative.  Negative for congestion, sneezing, sore throat and trouble swallowing.    Eyes: Negative.  Negative for visual disturbance.   Respiratory: Negative.  Negative for chest tightness and shortness of breath.    Cardiovascular: Negative.  Negative for chest pain, palpitations and leg swelling.   Gastrointestinal: Negative.  Negative for abdominal distention, abdominal pain, nausea and vomiting.   Endocrine: Negative.    Genitourinary: Negative.    Musculoskeletal: Negative.    Skin: Negative.    Allergic/Immunologic: Negative.    Neurological: Negative.    Hematological: Negative.    Psychiatric/Behavioral: Negative.        BP 98/66 (BP Location: Left arm, Patient Position: Sitting, Cuff Size: Adult)   Pulse 100   Resp 16   Ht 149.9 cm (59\")   Wt 61.7 kg (136 lb)   SpO2 99%   BMI 27.47 kg/m²   Physical Exam  Constitutional:       Appearance: She is well-developed.   HENT:      Head: Normocephalic and atraumatic.      Nose: Nose normal.      Mouth/Throat:      Mouth: Mucous membranes are moist.   Eyes:      Pupils: Pupils are equal, round, and " reactive to light.   Cardiovascular:      Rate and Rhythm: Normal rate and regular rhythm.      Pulses:           Carotid pulses are 2+ on the right side and 2+ on the left side.       Radial pulses are 2+ on the right side and 2+ on the left side.        Femoral pulses are 2+ on the right side and 2+ on the left side.       Popliteal pulses are 2+ on the right side and 2+ on the left side.        Dorsalis pedis pulses are 2+ on the right side and 2+ on the left side.        Posterior tibial pulses are 2+ on the right side and 2+ on the left side.   Pulmonary:      Effort: Pulmonary effort is normal.   Abdominal:      General: There is no distension.      Palpations: Abdomen is soft. There is no mass.      Tenderness: There is no abdominal tenderness.   Musculoskeletal:         General: Normal range of motion.      Cervical back: Normal range of motion and neck supple.   Skin:     General: Skin is warm and dry.      Capillary Refill: Capillary refill takes less than 2 seconds.   Neurological:      Mental Status: She is alert and oriented to person, place, and time.   Psychiatric:         Behavior: Behavior normal.         Thought Content: Thought content normal.         Judgment: Judgment normal.         No results found.    Patient Active Problem List   Diagnosis   • Chronic fatigue   • Gastroparesis   • Orthostatic hypotension   • Nausea   • Chest pain, atypical   • Tachycardia   • Altered bowel function   • Shortness of breath   • Anemia   • Samantha-Danlos syndrome   • Elevated blood pressure reading without diagnosis of hypertension   • Encounter for lipid screening for cardiovascular disease   • Excessive thirst   • Flank pain   • Headache   • Hyperlipidemia   • Irregular menstrual cycle   • Pelvic and perineal pain   • Vitamin D deficiency         ICD-10-CM ICD-9-CM   1. Gastroparesis  K31.84 536.3   2. Samantha-Danlos syndrome  Q79.60 756.83   3. History of difficult venous access  Z87.898 V13.89       Lab  Frequency Next Occurrence       Plan: After thoroughly evaluating Nai Golden, I believe the best course of action is to proceed with Port-A-Cath placement in the OR.  Patient has history of gastroparesis and requires serial administration of IV fluids.  She has had issues with IV access in the past making it difficult for this.  As such I think it is appropriate to proceed with Port-A-Cath placement.  However we did have a long discussion today given her young age that with chronic port placement she has increased risk of central venous stenosis and occlusion which could lead to future SVC syndrome with upper extremity and facial swelling and other issues.  There is also risk of thrombus with port placement as well as risk of port infection which would necessitate removal.  She understands all of these things given the need for ongoing IV fluid administration from her gastroparesis and difficult IV access still wishes to proceed with a port. The risks and benefits were explained at great length to the patient which include but are not limited to bleeding, infection, vessel damage, nerve damage and pneumothorax. The patient understands the risks and wishes for me to proceed.  The patient can continue taking their current medication regimen as previously planned.  She otherwise has no significant vascular risk factors. BMI is >= 25 and <30. (Overweight) The following options were offered after discussion;: exercise counseling/recommendations and nutrition counseling/recommendations.  This was all discussed in full with complete understanding.    Thank you for allowing me to participate in the care of your patient.  Please do not hesitate with any questions or concerns.  I will keep you aware of any further encounters with Nai Golden.        Sincerely yours,         Jordan Baez MD

## 2022-08-09 ENCOUNTER — TELEPHONE (OUTPATIENT)
Dept: VASCULAR SURGERY | Facility: CLINIC | Age: 27
End: 2022-08-09

## 2022-08-09 PROBLEM — Z87.898 HISTORY OF DIFFICULT VENOUS ACCESS: Status: ACTIVE | Noted: 2022-08-09

## 2022-08-09 NOTE — TELEPHONE ENCOUNTER
SPOKE WITH PATIENT REGARDING SURGERY. PT WAS INFORMED OF PRE WORK ON 08/23 AT 0945. PT WAS ALSO INFORMED OF SURGERY ON 08/25 AT 0700. PT WAS INFORMED OF COVID TEST AND VISITATION. PT STATED UNDERSTANDING OF INSTRUCTIONS AND ALL INFORMATION.

## 2022-08-23 ENCOUNTER — LAB (OUTPATIENT)
Dept: LAB | Facility: HOSPITAL | Age: 27
End: 2022-08-23

## 2022-08-23 ENCOUNTER — PRE-ADMISSION TESTING (OUTPATIENT)
Dept: PREADMISSION TESTING | Facility: HOSPITAL | Age: 27
End: 2022-08-23

## 2022-08-23 VITALS
RESPIRATION RATE: 18 BRPM | OXYGEN SATURATION: 100 % | HEART RATE: 94 BPM | DIASTOLIC BLOOD PRESSURE: 76 MMHG | WEIGHT: 135.8 LBS | SYSTOLIC BLOOD PRESSURE: 118 MMHG | HEIGHT: 62 IN | BODY MASS INDEX: 24.99 KG/M2

## 2022-08-23 DIAGNOSIS — Z87.898 HISTORY OF DIFFICULT VENOUS ACCESS: ICD-10-CM

## 2022-08-23 DIAGNOSIS — K31.84 GASTROPARESIS: ICD-10-CM

## 2022-08-23 LAB
ABO GROUP BLD: NORMAL
ANION GAP SERPL CALCULATED.3IONS-SCNC: 7 MMOL/L (ref 5–15)
BASOPHILS # BLD AUTO: 0.05 10*3/MM3 (ref 0–0.2)
BASOPHILS NFR BLD AUTO: 0.7 % (ref 0–1.5)
BLD GP AB SCN SERPL QL: NEGATIVE
BUN SERPL-MCNC: 10 MG/DL (ref 6–20)
BUN/CREAT SERPL: 15.4 (ref 7–25)
CALCIUM SPEC-SCNC: 9.7 MG/DL (ref 8.6–10.5)
CHLORIDE SERPL-SCNC: 103 MMOL/L (ref 98–107)
CO2 SERPL-SCNC: 29 MMOL/L (ref 22–29)
CREAT SERPL-MCNC: 0.65 MG/DL (ref 0.57–1)
DEPRECATED RDW RBC AUTO: 37.9 FL (ref 37–54)
EGFRCR SERPLBLD CKD-EPI 2021: 126.3 ML/MIN/1.73
EOSINOPHIL # BLD AUTO: 0.08 10*3/MM3 (ref 0–0.4)
EOSINOPHIL NFR BLD AUTO: 1.1 % (ref 0.3–6.2)
ERYTHROCYTE [DISTWIDTH] IN BLOOD BY AUTOMATED COUNT: 11.9 % (ref 12.3–15.4)
GLUCOSE SERPL-MCNC: 84 MG/DL (ref 65–99)
HCT VFR BLD AUTO: 41.1 % (ref 34–46.6)
HGB BLD-MCNC: 14 G/DL (ref 12–15.9)
IMM GRANULOCYTES # BLD AUTO: 0.03 10*3/MM3 (ref 0–0.05)
IMM GRANULOCYTES NFR BLD AUTO: 0.4 % (ref 0–0.5)
LYMPHOCYTES # BLD AUTO: 2.09 10*3/MM3 (ref 0.7–3.1)
LYMPHOCYTES NFR BLD AUTO: 29 % (ref 19.6–45.3)
MCH RBC QN AUTO: 30.4 PG (ref 26.6–33)
MCHC RBC AUTO-ENTMCNC: 34.1 G/DL (ref 31.5–35.7)
MCV RBC AUTO: 89.3 FL (ref 79–97)
MONOCYTES # BLD AUTO: 0.54 10*3/MM3 (ref 0.1–0.9)
MONOCYTES NFR BLD AUTO: 7.5 % (ref 5–12)
NEUTROPHILS NFR BLD AUTO: 4.41 10*3/MM3 (ref 1.7–7)
NEUTROPHILS NFR BLD AUTO: 61.3 % (ref 42.7–76)
NRBC BLD AUTO-RTO: 0 /100 WBC (ref 0–0.2)
PLATELET # BLD AUTO: 241 10*3/MM3 (ref 140–450)
PMV BLD AUTO: 10.6 FL (ref 6–12)
POTASSIUM SERPL-SCNC: 4.3 MMOL/L (ref 3.5–5.2)
RBC # BLD AUTO: 4.6 10*6/MM3 (ref 3.77–5.28)
RH BLD: POSITIVE
SARS-COV-2 ORF1AB RESP QL NAA+PROBE: NOT DETECTED
SODIUM SERPL-SCNC: 139 MMOL/L (ref 136–145)
T&S EXPIRATION DATE: NORMAL
WBC NRBC COR # BLD: 7.2 10*3/MM3 (ref 3.4–10.8)

## 2022-08-23 PROCEDURE — C9803 HOPD COVID-19 SPEC COLLECT: HCPCS

## 2022-08-23 PROCEDURE — 80048 BASIC METABOLIC PNL TOTAL CA: CPT

## 2022-08-23 PROCEDURE — U0004 COV-19 TEST NON-CDC HGH THRU: HCPCS

## 2022-08-23 PROCEDURE — 85025 COMPLETE CBC W/AUTO DIFF WBC: CPT

## 2022-08-23 PROCEDURE — 86901 BLOOD TYPING SEROLOGIC RH(D): CPT | Performed by: SURGERY

## 2022-08-23 PROCEDURE — 36415 COLL VENOUS BLD VENIPUNCTURE: CPT

## 2022-08-23 PROCEDURE — 86900 BLOOD TYPING SEROLOGIC ABO: CPT | Performed by: SURGERY

## 2022-08-23 PROCEDURE — 86850 RBC ANTIBODY SCREEN: CPT | Performed by: SURGERY

## 2022-08-23 NOTE — DISCHARGE INSTRUCTIONS
Before you come to the hospital        Arrival time: AS DIRECTED BY OFFICE     YOU MAY TAKE THE FOLLOWING MEDICATION(S) THE MORNING OF SURGERY WITH A SIP OF WATER: NONE           ALL OTHER HOME MEDICATION CHECK WITH YOUR PHYSICIAN (especially if you are taking diabetes medicines or blood thinners)      If you were given and instructed to use a germ- killing soap, use as directed the night before surgery and again the morning of surgery or as directed by your surgeon.    (See attached information for How to Use Chlorhexidine for Bathing if applicable.)            Eating and drinking restrictions prior to scheduled arrival time    2 Hours before arrival time STOP   Drinking Clear liquids (water, apple juice-no pulp)     6 Hours before arrival time STOP   Milk or drinks that contain milk, full liquids    6 Hours before arrival time STOP   Light meals or foods, such as toast or cereal    8 Hours before arrival time STOP   Heavy foods, such as meat, fried foods, or fatty foods    (It is extremely important that you follow these guidelines to prevent delay or cancelation of your procedure)     Clear Liquids  Water and flavored water                                                                      Clear Fruit juices, such as cranberry juice and apple juice.  Black coffee (NO cream of any kind, including powdered).  Plain tea  Clear bouillon or broth.  Flavored gelatin.  Soda.  Gatorade or Powerade.  Full liquid examples  Juices that have pulp.  Frozen ice pops that contain fruit pieces.  Coffee with creamer  Milk.  Yogurt.                MANAGING PAIN AFTER SURGERY    We know you are probably wondering what your pain will be like after surgery.  Following surgery it is unrealistic to expect you will not have pain.   Pain is how our bodies let us know that something is wrong or cautions us to be careful.  That said, our goal is to make your pain tolerable.    Methods we may use to treat your pain include (oral or IV  medications, PCAs, epidurals, nerve blocks, etc.)   While some procedures require IV pain medications for a short time after surgery, transitioning to pain medications by mouth allows for better management of pain.   Your nurse will encourage you to take oral pain medications whenever possible.  IV medications work almost immediately, but only last a short while.  Taking medications by mouth allows for a more constant level of medication in your blood stream for a longer period of time.      Once your pain is out of control it is harder to get back under control.  It is important you are aware when your next dose of pain medication is due.  If you are admitted, your nurse may write the time of your next dose on the white board in your room to help you remember.      We are interested in your pain and encourage you to inform us about aggravating factors during your visit.   Many times a simple repositioning every few hours can make a big difference.    If your physician says it is okay, do not let your pain prevent you from getting out of bed. Be sure to call your nurse for assistance prior to getting up so you do not fall.      Before surgery, please decide your tolerable pain goal.  These faces help describe the pain ratings we use on a 0-10 scale.   Be prepared to tell us your goal and whether or not you take pain or anxiety medications at home.          Preparing for Surgery  Preparing for surgery is an important part of your care. It can make things go more smoothly and help you avoid complications. The steps leading up to surgery may vary among hospitals. Follow all instructions given to you by your health care providers. Ask questions if you do not understand something. Talk about any concerns that you have.  Here are some questions to consider asking before your surgery:  If my surgery is not an emergency (is elective), when would be the best time to have the surgery?  What arrangements do I need to make for  work, home, or school?  What will my recovery be like? How long will it be before I can return to normal activities?  Will I need to prepare my home? Will I need to arrange care for me or my children?  Should I expect to have pain after surgery? What are my pain management options? Are there nonmedical options that I can try for pain?  Tell a health care provider about:  Any allergies you have.  All medicines you are taking, including vitamins, herbs, eye drops, creams, and over-the-counter medicines.  Any problems you or family members have had with anesthetic medicines.  Any blood disorders you have.  Any surgeries you have had.  Any medical conditions you have.  Whether you are pregnant or may be pregnant.  What are the risks?  The risks and complications of surgery depend on the specific procedure that you have. Discuss all the risks with your health care providers before your surgery. Ask about common surgical complications, which may include:  Infection.  Bleeding or a need for blood replacement (transfusion).  Allergic reactions to medicines.  Damage to surrounding nerves, tissues, or structures.  A blood clot.  Scarring.  Failure of the surgery to correct the problem.  Follow these instructions before the procedure:  Several days or weeks before your procedure  You may have a physical exam by your primary health care provider to make sure it is safe for you to have surgery.  You may have testing. This may include a chest X-ray, blood and urine tests, electrocardiogram (ECG), or other testing.  Ask your health care provider about:  Changing or stopping your regular medicines. This is especially important if you are taking diabetes medicines or blood thinners.  Taking medicines such as aspirin and ibuprofen. These medicines can thin your blood. Do not take these medicines unless your health care provider tells you to take them.  Taking over-the-counter medicines, vitamins, herbs, and supplements.  Do not use  any products that contain nicotine or tobacco, such as cigarettes and e-cigarettes. If you need help quitting, ask your health care provider.  Avoid alcohol.  Ask your health care provider if there are exercises you can do to prepare for surgery.  Eat a healthy diet.   Plan to have someone take you home from the hospital or clinic.  Plan to have a responsible adult care for you for at least 24 hours after you leave the hospital or clinic. This is important.  The day before your procedure  You may be given antibiotic medicine to take by mouth to help prevent infection. Take it as told by your health care provider.  You may be asked to shower with a germ-killing soap.  Follow instructions from your health care provider about eating and drinking restrictions. This includes gum, mints and hard candy.  Pack comfortable clothes according to your procedure.   The day of your procedure  You may need to take another shower with a germ-killing soap before you leave home in the morning.  With a small sip of water, take only the medicines that you are told to take.  Remove all jewelry including rings.   Leave anything you consider valuable at home except hearing aids if needed.  Do not wear any makeup, nail polish, powder, deodorant, lotion, hair accessories, or anything on your skin or body except your clothes.  If you will be staying in the hospital, bring a case to hold your glasses, contacts, or dentures. You may also want to bring your robe and non-skid footwear.  If you wear oxygen at home, bring it with you the day of surgery.  If instructed by your health care provider, bring your sleep apnea device with you on the day of your surgery (if this applies to you).  You may want to leave your suitcase and sleep apnea device in the car until after surgery.   Arrive at the hospital as scheduled.  Bring a friend or family member with you who can help to answer questions and be present while you meet with your health care  provider.  At the hospital  When you arrive at the hospital:  Go to registration located at the main entrance of the hospital. You will be registered and given a beeper and a sticker sheet. Take the stickers to the Outpatient nurses desk and place in the black tray. This is to notify staff that you have arrived. Then return to the lobby to wait.   When your beeper lights up and vibrates proceed through the double doors, under the stairs, and a member of the Outpatient Surgery staff will escort you to your preoperative room.  You may have to wear compression sleeves. These help to prevent blood clots and reduce swelling in your legs.  An IV may be inserted into one of your veins.              In the operating room, you may be given one or more of the following:        A medicine to help you relax (sedative).        A medicine to numb the area (local anesthetic).        A medicine to make you fall asleep (general anesthetic).        A medicine that is injected into an area of your body to numb everything below the                      injection site (regional anesthetic).  You may be given an antibiotic through your IV to help prevent infection.  Your surgical site will be marked or identified.    Contact a health care provider if you:  Develop a fever of more than 100.4°F (38°C) or other feelings of illness during the 48 hours before your surgery.  Have symptoms that get worse.  Have questions or concerns about your surgery.  Summary  Preparing for surgery can make the procedure go more smoothly and lower your risk of complications.  Before surgery, make a list of questions and concerns to discuss with your surgeon. Ask about the risks and possible complications.  In the days or weeks before your surgery, follow all instructions from your health care provider. You may need to stop smoking, avoid alcohol, follow eating restrictions, and change or stop your regular medicines.  Contact your surgeon if you develop a  fever or other signs of illness during the few days before your surgery.  This information is not intended to replace advice given to you by your health care provider. Make sure you discuss any questions you have with your health care provider.  Document Revised: 12/21/2018 Document Reviewed: 10/23/2018  Elsevier Patient Education © 2021 [employer]

## 2022-08-24 ENCOUNTER — TELEPHONE (OUTPATIENT)
Dept: VASCULAR SURGERY | Facility: CLINIC | Age: 27
End: 2022-08-24

## 2022-08-24 ENCOUNTER — TRANSCRIBE ORDERS (OUTPATIENT)
Dept: ADMINISTRATIVE | Facility: HOSPITAL | Age: 27
End: 2022-08-24

## 2022-08-24 DIAGNOSIS — R42 DIZZINESS: Primary | ICD-10-CM

## 2022-08-24 DIAGNOSIS — R47.01 APHASIA: ICD-10-CM

## 2022-08-25 ENCOUNTER — ANESTHESIA EVENT (OUTPATIENT)
Dept: PERIOP | Facility: HOSPITAL | Age: 27
End: 2022-08-25

## 2022-08-25 ENCOUNTER — APPOINTMENT (OUTPATIENT)
Dept: GENERAL RADIOLOGY | Facility: HOSPITAL | Age: 27
End: 2022-08-25

## 2022-08-25 ENCOUNTER — APPOINTMENT (OUTPATIENT)
Dept: INTERVENTIONAL RADIOLOGY/VASCULAR | Facility: HOSPITAL | Age: 27
End: 2022-08-25

## 2022-08-25 ENCOUNTER — HOSPITAL ENCOUNTER (OUTPATIENT)
Facility: HOSPITAL | Age: 27
Setting detail: HOSPITAL OUTPATIENT SURGERY
Discharge: HOME | End: 2022-08-25
Attending: SURGERY | Admitting: SURGERY

## 2022-08-25 ENCOUNTER — ANESTHESIA (OUTPATIENT)
Dept: PERIOP | Facility: HOSPITAL | Age: 27
End: 2022-08-25

## 2022-08-25 VITALS
RESPIRATION RATE: 18 BRPM | TEMPERATURE: 98.1 F | DIASTOLIC BLOOD PRESSURE: 68 MMHG | HEART RATE: 76 BPM | SYSTOLIC BLOOD PRESSURE: 92 MMHG | OXYGEN SATURATION: 98 %

## 2022-08-25 DIAGNOSIS — K31.84 GASTROPARESIS: ICD-10-CM

## 2022-08-25 DIAGNOSIS — Z87.898 HISTORY OF DIFFICULT VENOUS ACCESS: ICD-10-CM

## 2022-08-25 LAB — B-HCG UR QL: NEGATIVE

## 2022-08-25 PROCEDURE — 25010000002 HEPARIN (PORCINE) PER 1000 UNITS: Performed by: SURGERY

## 2022-08-25 PROCEDURE — 25010000002 KETOROLAC TROMETHAMINE PER 15 MG: Performed by: ANESTHESIOLOGY

## 2022-08-25 PROCEDURE — 81025 URINE PREGNANCY TEST: CPT | Performed by: SURGERY

## 2022-08-25 PROCEDURE — 71045 X-RAY EXAM CHEST 1 VIEW: CPT

## 2022-08-25 PROCEDURE — 25010000002 CEFAZOLIN PER 500 MG: Performed by: SURGERY

## 2022-08-25 PROCEDURE — 0 LIDOCAINE 1 % SOLUTION: Performed by: SURGERY

## 2022-08-25 PROCEDURE — 25010000002 FENTANYL CITRATE (PF) 100 MCG/2ML SOLUTION: Performed by: NURSE ANESTHETIST, CERTIFIED REGISTERED

## 2022-08-25 PROCEDURE — 77001 FLUOROGUIDE FOR VEIN DEVICE: CPT

## 2022-08-25 PROCEDURE — 76000 FLUOROSCOPY <1 HR PHYS/QHP: CPT

## 2022-08-25 PROCEDURE — 25010000002 PROPOFOL 1000 MG/100ML EMULSION: Performed by: NURSE ANESTHETIST, CERTIFIED REGISTERED

## 2022-08-25 PROCEDURE — 25010000002 PROPOFOL 10 MG/ML EMULSION: Performed by: NURSE ANESTHETIST, CERTIFIED REGISTERED

## 2022-08-25 PROCEDURE — 36561 INSERT TUNNELED CV CATH: CPT | Performed by: SURGERY

## 2022-08-25 PROCEDURE — C1788 PORT, INDWELLING, IMP: HCPCS | Performed by: SURGERY

## 2022-08-25 PROCEDURE — 77001 FLUOROGUIDE FOR VEIN DEVICE: CPT | Performed by: SURGERY

## 2022-08-25 DEVICE — PRT INTRO VASC/INTERV VORTEX FILL/HL DETACH/POLYURET/CATH 8F: Type: IMPLANTABLE DEVICE | Site: CHEST WALL | Status: FUNCTIONAL

## 2022-08-25 RX ORDER — IBUPROFEN 600 MG/1
600 TABLET ORAL ONCE AS NEEDED
Status: DISCONTINUED | OUTPATIENT
Start: 2022-08-25 | End: 2022-08-25 | Stop reason: HOSPADM

## 2022-08-25 RX ORDER — SODIUM CHLORIDE 0.9 % (FLUSH) 0.9 %
3 SYRINGE (ML) INJECTION AS NEEDED
Status: DISCONTINUED | OUTPATIENT
Start: 2022-08-25 | End: 2022-08-25 | Stop reason: HOSPADM

## 2022-08-25 RX ORDER — FAMOTIDINE 10 MG/ML
20 INJECTION, SOLUTION INTRAVENOUS
Status: COMPLETED | OUTPATIENT
Start: 2022-08-25 | End: 2022-08-25

## 2022-08-25 RX ORDER — SODIUM CHLORIDE, SODIUM LACTATE, POTASSIUM CHLORIDE, CALCIUM CHLORIDE 600; 310; 30; 20 MG/100ML; MG/100ML; MG/100ML; MG/100ML
1000 INJECTION, SOLUTION INTRAVENOUS CONTINUOUS
Status: DISCONTINUED | OUTPATIENT
Start: 2022-08-25 | End: 2022-08-25 | Stop reason: HOSPADM

## 2022-08-25 RX ORDER — FENTANYL CITRATE 50 UG/ML
25 INJECTION, SOLUTION INTRAMUSCULAR; INTRAVENOUS
Status: DISCONTINUED | OUTPATIENT
Start: 2022-08-25 | End: 2022-08-25 | Stop reason: HOSPADM

## 2022-08-25 RX ORDER — SODIUM CHLORIDE, SODIUM LACTATE, POTASSIUM CHLORIDE, CALCIUM CHLORIDE 600; 310; 30; 20 MG/100ML; MG/100ML; MG/100ML; MG/100ML
100 INJECTION, SOLUTION INTRAVENOUS CONTINUOUS
Status: DISCONTINUED | OUTPATIENT
Start: 2022-08-25 | End: 2022-08-25 | Stop reason: HOSPADM

## 2022-08-25 RX ORDER — PROPOFOL 10 MG/ML
INJECTION, EMULSION INTRAVENOUS CONTINUOUS PRN
Status: DISCONTINUED | OUTPATIENT
Start: 2022-08-25 | End: 2022-08-25 | Stop reason: SURG

## 2022-08-25 RX ORDER — LIDOCAINE HYDROCHLORIDE 10 MG/ML
INJECTION, SOLUTION INFILTRATION; PERINEURAL AS NEEDED
Status: DISCONTINUED | OUTPATIENT
Start: 2022-08-25 | End: 2022-08-25 | Stop reason: HOSPADM

## 2022-08-25 RX ORDER — NALOXONE HCL 0.4 MG/ML
0.4 VIAL (ML) INJECTION AS NEEDED
Status: DISCONTINUED | OUTPATIENT
Start: 2022-08-25 | End: 2022-08-25 | Stop reason: HOSPADM

## 2022-08-25 RX ORDER — PROPOFOL 10 MG/ML
INJECTION, EMULSION INTRAVENOUS AS NEEDED
Status: DISCONTINUED | OUTPATIENT
Start: 2022-08-25 | End: 2022-08-25

## 2022-08-25 RX ORDER — PROPOFOL 10 MG/ML
VIAL (ML) INTRAVENOUS AS NEEDED
Status: DISCONTINUED | OUTPATIENT
Start: 2022-08-25 | End: 2022-08-25 | Stop reason: SURG

## 2022-08-25 RX ORDER — HYDROCODONE BITARTRATE AND ACETAMINOPHEN 5; 325 MG/1; MG/1
1 TABLET ORAL ONCE AS NEEDED
Status: DISCONTINUED | OUTPATIENT
Start: 2022-08-25 | End: 2022-08-25 | Stop reason: HOSPADM

## 2022-08-25 RX ORDER — LABETALOL HYDROCHLORIDE 5 MG/ML
5 INJECTION, SOLUTION INTRAVENOUS
Status: DISCONTINUED | OUTPATIENT
Start: 2022-08-25 | End: 2022-08-25 | Stop reason: HOSPADM

## 2022-08-25 RX ORDER — SODIUM CHLORIDE 0.9 % (FLUSH) 0.9 %
3 SYRINGE (ML) INJECTION EVERY 12 HOURS SCHEDULED
Status: DISCONTINUED | OUTPATIENT
Start: 2022-08-25 | End: 2022-08-25 | Stop reason: HOSPADM

## 2022-08-25 RX ORDER — FLUMAZENIL 0.1 MG/ML
0.2 INJECTION INTRAVENOUS AS NEEDED
Status: DISCONTINUED | OUTPATIENT
Start: 2022-08-25 | End: 2022-08-25 | Stop reason: HOSPADM

## 2022-08-25 RX ORDER — SODIUM CHLORIDE 0.9 % (FLUSH) 0.9 %
3-10 SYRINGE (ML) INJECTION AS NEEDED
Status: DISCONTINUED | OUTPATIENT
Start: 2022-08-25 | End: 2022-08-25 | Stop reason: HOSPADM

## 2022-08-25 RX ORDER — BUPIVACAINE HCL/0.9 % NACL/PF 0.1 %
2 PLASTIC BAG, INJECTION (ML) EPIDURAL ONCE
Status: COMPLETED | OUTPATIENT
Start: 2022-08-25 | End: 2022-08-25

## 2022-08-25 RX ORDER — LIDOCAINE HYDROCHLORIDE 10 MG/ML
0.5 INJECTION, SOLUTION EPIDURAL; INFILTRATION; INTRACAUDAL; PERINEURAL ONCE AS NEEDED
Status: DISCONTINUED | OUTPATIENT
Start: 2022-08-25 | End: 2022-08-25

## 2022-08-25 RX ORDER — OXYCODONE HYDROCHLORIDE 5 MG/1
5 TABLET ORAL EVERY 4 HOURS PRN
Qty: 12 TABLET | Refills: 0 | Status: SHIPPED | OUTPATIENT
Start: 2022-08-25 | End: 2022-09-16

## 2022-08-25 RX ORDER — FENTANYL CITRATE 50 UG/ML
INJECTION, SOLUTION INTRAMUSCULAR; INTRAVENOUS AS NEEDED
Status: DISCONTINUED | OUTPATIENT
Start: 2022-08-25 | End: 2022-08-25 | Stop reason: SURG

## 2022-08-25 RX ORDER — KETOROLAC TROMETHAMINE 30 MG/ML
30 INJECTION, SOLUTION INTRAMUSCULAR; INTRAVENOUS EVERY 6 HOURS PRN
Status: COMPLETED | OUTPATIENT
Start: 2022-08-25 | End: 2022-08-25

## 2022-08-25 RX ORDER — LIDOCAINE HYDROCHLORIDE 10 MG/ML
0.5 INJECTION, SOLUTION EPIDURAL; INFILTRATION; INTRACAUDAL; PERINEURAL ONCE AS NEEDED
Status: DISCONTINUED | OUTPATIENT
Start: 2022-08-25 | End: 2022-08-25 | Stop reason: HOSPADM

## 2022-08-25 RX ADMIN — SODIUM CHLORIDE, POTASSIUM CHLORIDE, SODIUM LACTATE AND CALCIUM CHLORIDE 1000 ML: 600; 310; 30; 20 INJECTION, SOLUTION INTRAVENOUS at 11:50

## 2022-08-25 RX ADMIN — Medication 2 G: at 13:38

## 2022-08-25 RX ADMIN — KETOROLAC TROMETHAMINE 30 MG: 30 INJECTION, SOLUTION INTRAMUSCULAR; INTRAVENOUS at 14:48

## 2022-08-25 RX ADMIN — PROPOFOL 50 MG: 10 INJECTION, EMULSION INTRAVENOUS at 13:37

## 2022-08-25 RX ADMIN — PROPOFOL 150 MCG/KG/MIN: 10 INJECTION, EMULSION INTRAVENOUS at 13:37

## 2022-08-25 RX ADMIN — FENTANYL CITRATE 100 MCG: 50 INJECTION, SOLUTION INTRAMUSCULAR; INTRAVENOUS at 13:34

## 2022-08-25 RX ADMIN — FAMOTIDINE 20 MG: 10 INJECTION, SOLUTION INTRAVENOUS at 12:31

## 2022-08-25 RX ADMIN — PROPOFOL 25 MG: 10 INJECTION, EMULSION INTRAVENOUS at 13:48

## 2022-08-25 NOTE — ANESTHESIA POSTPROCEDURE EVALUATION
Patient: Nai Golden    Procedure Summary     Date: 08/25/22 Room / Location: [facility] OR  / [facility] HYBRID OR 12    Anesthesia Start: 1334 Anesthesia Stop: 1430    Procedure: INSERTION VENOUS ACCESS DEVICE (PORT-A-CATH) (N/A Neck) Diagnosis:       Gastroparesis      History of difficult venous access      (Gastroparesis [K31.84])      (History of difficult venous access [Z87.898])    Surgeons: Jordan Baez MD Provider: Cristiano Mondragon CRNA    Anesthesia Type: MAC ASA Status: 3          Anesthesia Type: MAC    Vitals  Vitals Value Taken Time   BP 94/62 08/25/22 1502   Temp 98.1 °F (36.7 °C) 08/25/22 1500   Pulse 77 08/25/22 1507   Resp 18 08/25/22 1500   SpO2 97 % 08/25/22 1507   Vitals shown include unvalidated device data.        Post Anesthesia Care and Evaluation    Patient location during evaluation: PACU  Patient participation: complete - patient participated  Level of consciousness: awake and alert  Pain management: adequate    Airway patency: patent  Anesthetic complications: No anesthetic complications  PONV Status: none  Cardiovascular status: acceptable and hemodynamically stable  Respiratory status: acceptable  Hydration status: acceptable    Comments: Blood pressure 92/68, pulse 76, temperature 98.1 °F (36.7 °C), temperature source Temporal, resp. rate 18, last menstrual period 06/19/2022, SpO2 98 %, not currently breastfeeding.    Patient discharged from PACU based upon Jamin score. Please see RN notes for further details

## 2022-08-25 NOTE — OP NOTE
Nai Golden  8/25/2022     PREOPERATIVE DIAGNOSIS: Gastroparesis [K31.84]  History of difficult venous access [Z87.898]     POSTOPERATIVE DIAGNOSIS: Post-Op Diagnosis Codes:     * Gastroparesis [K31.84]     * History of difficult venous access [Z87.898]     PROCEDURE PERFORMED:  1.  Ultrasound-guided right internal jugular vein access  2.  Insertion of 8 Turkish single-lumen Port-A-Cath     SURGEON: Jordan Baez MD     ANESTHESIA: General.    PREPARATION: Routine.    STAFF: Circulator: Mary Benz RN  Scrub Person: Irish Cramer  Assistant: Aryan Howard  Vascular Radiology Technician: Shayy Allred    Estimated Blood Loss: minimal    SPECIMENS: None    COMPLICATIONS: Apparent    INDICATIONS: Nai Golden is a 26 y.o. 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  She is not able to take significant p.o. intake and requires serial IV fluid administrations.  She has difficult IV access and so had requested placement of port.  We had a long talk in the office regarding complications of long-term ports including but not limited to SVC syndrome, venous stenosis/fibrosis, and port infections.  She understood and wished to proceed with the port placement. The indications, risks, and possible complications of the procedure were explained to the patient, who voiced understanding and wished to proceed with surgery.     PROCEDURE IN DETAIL: The patient was taken to the operating room and placed on the operating table in a supine position. After anesthesia was obtained, the bilateral neck and anterior chest wall was prepped and draped in a sterile manner.  Initially under direct ultrasound guidance the right internal jugular vein was accessed using an 18-gauge needle and soft J-wire was easily advanced down into the IVC.  The needle was then removed and the wire was secured to the drape.  Following this attention was turned to the right anterior chest wall for  creation of a subcutaneous pocket for the port.  The anterior chest wall was anesthetized with 15 cc of 1% lidocaine plain.  Transverse incision was then made using #15 blade and carried down through skin to the subcutaneous tissue.  Dissection with electrocautery was then continued down to the fascial layer.  Now using blunt dissection with my finger I created a subcutaneous port just on top of the fascia going inferiorly.  This was made just big enough to fit the port and the port was brought onto the field and sized to ensure that it would fit in the pocket.  Now at this point to 3-0 Prolene anchoring sutures were placed through the fascia and the base of the pocket to later secure the port.  Now following this all stab incision was made at the base of the right neck where the wire exited using a #11 blade.  And over this wire the peel-away sheath was advanced down into the cavoatrial junction and the wire and inner dilator were removed.  The port tubing was then tunneled from the incision on the chest wall and out through the incision at the base of the right neck.  The port tubing was then advanced down through the peel-away sheath with the tip at the cavoatrial junction and the peel-away sheath removed.  The distal portion of the tubing was then trimmed to length and attached to the port and the port seated within the subcutaneous pocket.  Fluoroscopy showed good position of the tubing and port with no kinking.  The Prolene sutures were then tied to secure the port to prevent malposition of the port.  Finally the port was accessed using the Santacruz needle and there was good blood return and easy flush and it was flushed with heparinized saline.  The wound was then irrigated with heparinized saline solution and hemostasis was achieved with electrocautery and was excellent.  The wound was then closed in layers with 3-0 Vicryl in a running fashion for the subcutaneous tissue, and a running 4 Monocryl for skin.  The  small stab incision at the base of the right neck was also closed with a single 4-0 Monocryl subcuticular stitch. Sterile dressings were applied. The patient tolerated the procedure well. Sponge and needle counts were correct. The patient was then awakened in the operating room and taken to the recovery room in good condition.    Jordan Baez MD  Date: 8/25/2022 Time: 14:32 CDT

## 2022-08-25 NOTE — ANESTHESIA PREPROCEDURE EVALUATION
Anesthesia Evaluation     history of anesthetic complications: PONV  NPO Solid Status: > 8 hours  NPO Liquid Status: > 8 hours           Airway   Mallampati: I  TM distance: >3 FB  Neck ROM: full  No difficulty expected  Dental      Pulmonary    (-) not a smoker  Cardiovascular   Exercise tolerance: good (4-7 METS)    (+) dysrhythmias Tachycardia, hyperlipidemia,     ROS comment: Echo 11/2021  · Left ventricular systolic function is normal. Left ventricular ejection fraction appears to be 56 - 60%.  · Normal right ventricular cavity size and systolic function noted.  · No significant valvular abnormalities identified on this study.         Neuro/Psych  (-) seizures, TIA, CVA  GI/Hepatic/Renal/Endo    (+)  GERD,    (-) liver disease, no renal disease, diabetes    ROS Comment: Gastroparesis- nausea today  No vomiting   last po intake 8 pm last night    Musculoskeletal         ROS comment: Samantha danlos syndrome  Abdominal    Substance History      OB/GYN          Other                        Anesthesia Plan    ASA 3     MAC     (Pt NPO since yesterday, feeling nauseous but she reports this is chronic. Discussed risk of aspiration, will pretx with pepcid and proceed with sedation with careful attention. )  intravenous induction     Anesthetic plan, risks, benefits, and alternatives have been provided, discussed and informed consent has been obtained with: patient.        CODE STATUS:

## 2022-08-25 NOTE — INTERVAL H&P NOTE
H&P reviewed. The patient was examined and there are no changes to the H&P.  For port-a-cath placement.  The risks and benefits were explained at great length to the patient which include but are not limited to bleeding, infection, vessel damage, nerve damage and pneumothorax. The patient understands the risks and wishes for me to proceed

## 2022-08-26 ENCOUNTER — TELEPHONE (OUTPATIENT)
Dept: VASCULAR SURGERY | Facility: CLINIC | Age: 27
End: 2022-08-26

## 2022-08-26 NOTE — TELEPHONE ENCOUNTER
Caller: Dariel Golden    Relationship to patient: Self    Best call back number: [phone]    Patient is needing: DARIEL GOLDEN WOULD LIKE A CALL BACK TO DISCUSS POST OP APPOINTMENT. HER CONCERN IS IT IS SCHEDULED 1WEEK AND THE DISCHARGE PAPERS SUGGESTED 2WEEKS.

## 2022-09-08 ENCOUNTER — TELEPHONE (OUTPATIENT)
Dept: VASCULAR SURGERY | Facility: CLINIC | Age: 27
End: 2022-09-08

## 2022-09-08 NOTE — TELEPHONE ENCOUNTER
Caller: Nai Golden    Relationship to patient: Self    Best call back number: [phone]    Chief complaint: MORNING APPT DUE TO HER WORK SCHEDULE CHANGING      Type of visit: POST OP     Requested date: SAME DAY    If rescheduling, when is the original appointment: 9/12/22

## 2022-09-15 ENCOUNTER — TELEPHONE (OUTPATIENT)
Dept: VASCULAR SURGERY | Facility: CLINIC | Age: 27
End: 2022-09-15

## 2022-09-16 ENCOUNTER — OFFICE VISIT (OUTPATIENT)
Dept: VASCULAR SURGERY | Facility: CLINIC | Age: 27
End: 2022-09-16

## 2022-09-16 VITALS
BODY MASS INDEX: 27.21 KG/M2 | RESPIRATION RATE: 16 BRPM | OXYGEN SATURATION: 99 % | DIASTOLIC BLOOD PRESSURE: 72 MMHG | SYSTOLIC BLOOD PRESSURE: 102 MMHG | WEIGHT: 135 LBS | HEART RATE: 63 BPM | HEIGHT: 59 IN

## 2022-09-16 DIAGNOSIS — Q79.60 EHLERS-DANLOS SYNDROME: ICD-10-CM

## 2022-09-16 DIAGNOSIS — K31.84 GASTROPARESIS: Primary | ICD-10-CM

## 2022-09-16 PROCEDURE — 99024 POSTOP FOLLOW-UP VISIT: CPT | Performed by: SURGERY

## 2022-09-16 NOTE — PROGRESS NOTES
09/16/2022      Ju Garibay APRN  [address] [place] [address]        Nai Golden  1995    Chief Complaint   Patient presents with   • Post-op     2 week follow-up from INSERTION VENOUS ACCESS DEVICE (PORT-A-CATH).  Pt states she is doing well after the port-a-cath insertion, but her skin is very itchy from the tape and had blisters.         Dear Ju Garibay APRN:    HPI     I had the pleasure of seeing your patient in the office today for follow up.  As you recall, the patient is a 26 y.o. female who we are currently following for recent Port-A-Cath insertion.  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  She has had difficulty with IV access in the past.  Port was placed about 2 weeks ago and she has had no issues.  She reports the site is healed well.  Port has not yet been accessed with initial access plan for this coming Monday.  She otherwise feels well with no acute complaints.    Review of Systems   Constitutional: Negative.  Negative for activity change, appetite change, chills, diaphoresis, fatigue and fever.   HENT: Negative.  Negative for congestion, sneezing, sore throat and trouble swallowing.    Eyes: Negative.  Negative for visual disturbance.   Respiratory: Negative.  Negative for chest tightness and shortness of breath.    Cardiovascular: Negative.  Negative for chest pain, palpitations and leg swelling.   Gastrointestinal: Negative.  Negative for abdominal distention, abdominal pain, nausea and vomiting.   Endocrine: Negative.    Genitourinary: Negative.    Musculoskeletal: Negative.    Skin: Negative.    Allergic/Immunologic: Negative.    Neurological: Negative.    Hematological: Negative.    Psychiatric/Behavioral: Negative.        /72 (BP Location: Left arm, Patient Position: Sitting, Cuff Size: Adult)   Pulse 63   Resp  "16   Ht 149.9 cm (59\")   Wt 61.2 kg (135 lb)   SpO2 99%   BMI 27.27 kg/m²   Physical Exam  Vitals reviewed.   Constitutional:       Appearance: Normal appearance. She is well-developed.   HENT:      Head: Normocephalic and atraumatic.      Nose: Nose normal.      Mouth/Throat:      Mouth: Mucous membranes are moist.   Eyes:      Pupils: Pupils are equal, round, and reactive to light.   Cardiovascular:      Rate and Rhythm: Normal rate and regular rhythm.      Pulses:           Carotid pulses are 2+ on the right side and 2+ on the left side.       Radial pulses are 2+ on the right side and 2+ on the left side.        Femoral pulses are 2+ on the right side and 2+ on the left side.       Popliteal pulses are 2+ on the right side and 2+ on the left side.        Dorsalis pedis pulses are 2+ on the right side and 2+ on the left side.        Posterior tibial pulses are 2+ on the right side and 2+ on the left side.      Comments: Right anterior chest wall port site from recent insertion is healed well.  No signs of infection or hematoma.  Pulmonary:      Effort: Pulmonary effort is normal.   Abdominal:      General: There is no distension.      Palpations: Abdomen is soft. There is no mass.      Tenderness: There is no abdominal tenderness.   Musculoskeletal:         General: Normal range of motion.      Cervical back: Normal range of motion and neck supple.   Skin:     General: Skin is warm and dry.      Capillary Refill: Capillary refill takes less than 2 seconds.   Neurological:      Mental Status: She is alert and oriented to person, place, and time.   Psychiatric:         Behavior: Behavior normal.         Thought Content: Thought content normal.         Judgment: Judgment normal.         DIAGNOSTIC DATA:    XR Chest 1 View    Result Date: 8/25/2022  Narrative: EXAMINATION: XR CHEST 1 VW-  8/25/2022 2:50 PM CDT  HISTORY: s/p R IJ port-a-cath insertion; K31.84-Gastroparesis; Z87.898-Personal history of other " specified conditions  1 view chest x-ray.  Normal heart and mediastinum.  Well-expanded, clear lungs.  Normal appearance of a right chest wall Port-A-Cath.  Summary: 1. Normal postoperative appearance. This report was finalized on 08/25/2022 15:03 by Dr. Mesfin Avelar MD.    IR Port Placement, FL C Arm During Surgery    Result Date: 8/28/2022  Narrative: Performed by Dr. Baez. Please see procedure note. This report was finalized on 08/28/2022 11:58 by Dr. Jordan Baez MD.      Patient Active Problem List   Diagnosis   • Chronic fatigue   • Gastroparesis   • Orthostatic hypotension   • Nausea   • Chest pain, atypical   • Tachycardia   • Altered bowel function   • Shortness of breath   • Anemia   • Samantha-Danlos syndrome   • Elevated blood pressure reading without diagnosis of hypertension   • Encounter for lipid screening for cardiovascular disease   • Excessive thirst   • Flank pain   • Headache   • Hyperlipidemia   • Irregular menstrual cycle   • Pelvic and perineal pain   • Vitamin D deficiency   • History of difficult venous access         ICD-10-CM ICD-9-CM   1. Gastroparesis  K31.84 536.3   2. Samantha-Danlos syndrome  Q79.60 756.83       Lab Frequency Next Occurrence   Follow Anesthesia Guidelines / Protocol Once 08/08/2022   Obtain Informed Consent Once 08/13/2022   Provide NPO Instructions to Patient Once 08/13/2022   Chlorhexidine Skin Prep Once 08/13/2022   MRI Brain With & Without Contrast Once 08/29/2022       PLAN: After thoroughly evaluating Nai Golden, I believe the best course of action is to remain conservative from a vascular standpoint.  Overall she is done very well after her recent right internal jugular vein Port-A-Cath placement.  Site has healed well and is cleared for use.  She notes that the plan is for it to be accessed this coming Monday for ongoing IV fluid administration needs.  Otherwise she has no acute vascular issues and so plan will be to see her on an as-needed basis  moving forward.  The patient is to continue taking their medications as previously discussed. .BMI is >= 25 and <30. (Overweight) The following options were offered after discussion;: exercise counseling/recommendations and nutrition counseling/recommendations. This was all discussed in full with complete understanding.  Thank you for allowing me to participate in the care of your patient.  Please do not hesitate to call with any questions or concerns.  We will keep you aware of any further encounters with Nai Golden.      Sincerely Yours,      Jordan Baez MD

## 2022-09-21 ENCOUNTER — APPOINTMENT (OUTPATIENT)
Dept: MRI IMAGING | Facility: HOSPITAL | Age: 27
End: 2022-09-21

## 2022-09-28 ENCOUNTER — OFFICE VISIT (OUTPATIENT)
Dept: OBSTETRICS AND GYNECOLOGY | Facility: CLINIC | Age: 27
End: 2022-09-28

## 2022-09-28 VITALS
HEIGHT: 59 IN | DIASTOLIC BLOOD PRESSURE: 70 MMHG | SYSTOLIC BLOOD PRESSURE: 108 MMHG | BODY MASS INDEX: 26.41 KG/M2 | WEIGHT: 131 LBS

## 2022-09-28 DIAGNOSIS — Z11.3 SCREENING EXAMINATION FOR VENEREAL DISEASE: ICD-10-CM

## 2022-09-28 DIAGNOSIS — L70.9 ACNE, UNSPECIFIED ACNE TYPE: ICD-10-CM

## 2022-09-28 DIAGNOSIS — Z30.09 ENCOUNTER FOR OTHER GENERAL COUNSELING OR ADVICE ON CONTRACEPTION: ICD-10-CM

## 2022-09-28 DIAGNOSIS — N64.4 BREAST PAIN, LEFT: ICD-10-CM

## 2022-09-28 DIAGNOSIS — Z12.4 SCREENING FOR MALIGNANT NEOPLASM OF CERVIX: ICD-10-CM

## 2022-09-28 DIAGNOSIS — Z01.411 ENCOUNTER FOR GYNECOLOGICAL EXAMINATION WITH ABNORMAL FINDING: Primary | ICD-10-CM

## 2022-09-28 DIAGNOSIS — N92.6 IRREGULAR MENSES: ICD-10-CM

## 2022-09-28 PROCEDURE — 99385 PREV VISIT NEW AGE 18-39: CPT | Performed by: OBSTETRICS & GYNECOLOGY

## 2022-09-28 PROCEDURE — G0123 SCREEN CERV/VAG THIN LAYER: HCPCS | Performed by: OBSTETRICS & GYNECOLOGY

## 2022-09-28 NOTE — PROGRESS NOTES
Chun Dinero MD  [facility]  [address]  [place] [address]  Office [phone]  Fax [phone]      [facility]  Nai Golden  : 1995  MRN: [id number]    Subjective   Subjective     Chief Complaint   Patient presents with   • Annual Exam     Pt here today as new pt for annual exam. Pt voices no concerns.        History of Present Illness  Nai Golden is a 26 y.o. female , , who comes to the office today for annual. She is without complaints.  with vasectomy. Declines contraception at this time. Declines STI testing. Of note, reports irregular menses but states this has been the norm. Does report left breast point tenderness. Not associated with cycles. Gradually worsening. Denies palpable masses in the breast or skin changes. Endorses acne issues involving the legs and up the trunk.      Review of Systems   Genitourinary: Positive for menstrual problem. Negative for difficulty urinating, genital sores, pelvic pain, vaginal bleeding and vaginal discharge.   All other systems reviewed and are negative.       OB hx:  OB History    Para Term  AB Living   3 3 2 0 0 3   SAB IAB Ectopic Molar Multiple Live Births   0 0 0 0 0 3      # Outcome Date GA Lbr Deangelo/2nd Weight Sex Delivery Anes PTL Lv   3 Term 20 40w0d  2807 g (6 lb 3 oz) M Vag-Spont None  AZUL   2 Term 18 39w0d  2910 g (6 lb 6.7 oz) F  EPI N AZUL   1 Para 09/16/15 39w0d   M CS-LTranv EPI N AZUL      Complications: Failure to Progress in Second Stage        GYN hx:  Date of LMP: Patient's last menstrual period was 2022 (exact date).  Date/Result of last Pap smear: was done on approximately 2019 and the result was: normal PAP.   HPV Vaccination: No  History of Sexually Transmitted Infection: No  Current Birth Control Method: vasectomy  History of Contraception: Yes. Details: h/o IUD that was expelled  Sexually active: Yes. Details:    FMH of Breast, Uterine, Ovarian, or  "Colon cancer: Yes. Details: mother with breast cancer  Other OB/GYN History: nothing else to add    Personal History     History Review Reviewed Comments   Past Medical History:  [x]     Past Surgical History: [x]     Family History: [x]     Social History: [x]       Current Outpatient Medications   Medication Instructions   • cyanocobalamin 1,000 mcg, Intramuscular, Every 14 Days   • cyanocobalamin 1,000 mcg, Intramuscular, Every 14 Days   • Emgality 120 mg, Subcutaneous, Every 30 Days   • Emgality 120 mg, Subcutaneous, Every 30 Days   • famotidine (PEPCID) 40 mg, Oral, Daily   • Lidocaine, Anorectal, (LC-5 Lidocaine) 5 % cream cream Apply Daily As Needed for port access.   • NON FORMULARY 10 mg, 4 Times Daily, Domperidone   • ondansetron (ZOFRAN) 8 MG tablet Take 1 tablet by mouth Every 6 (Six) Hours to 8 (Eight) As Needed.   • Trulance 3 mg, Oral, Daily   • ubrogepant (ubrogepant) 100 MG tablet Take 1 tablet by mouth once daily. May take 2nd dose at least 2 hours after first dose as needed. **Max 2 tabs (200mg) in 24 hours**       Allergies   Allergen Reactions   • Adhesive Tape Rash   • Compazine [Prochlorperazine Edisylate] Rash   • Percocet [Oxycodone-Acetaminophen] Rash   • Scopolamine Rash       Objective    Objective     Vitals:   Visit Vitals  /70   Ht 149.9 cm (59\")   Wt 59.4 kg (131 lb)   LMP 09/20/2022 (Exact Date)   BMI 26.46 kg/m²        Physical Exam  Vitals reviewed. Exam conducted with a chaperone present (Consent for exam obtained verbally from patient.).   Constitutional:       General: She is not in acute distress.     Appearance: Normal appearance. She is well-developed.   HENT:      Head: Normocephalic and atraumatic.   Eyes:      General: No scleral icterus.     Conjunctiva/sclera: Conjunctivae normal.   Pulmonary:      Effort: Pulmonary effort is normal. No respiratory distress.   Chest:   Breasts: Breasts are symmetrical.      Right: Normal. No axillary adenopathy or supraclavicular " adenopathy.      Left: Tenderness present. No swelling, bleeding, inverted nipple, mass, nipple discharge, skin change, axillary adenopathy or supraclavicular adenopathy.         Abdominal:      General: There is no distension.      Palpations: Abdomen is soft. There is no mass.      Tenderness: There is no abdominal tenderness.   Genitourinary:     General: Normal vulva.      Exam position: Lithotomy position.      Pubic Area: No pubic lice.       Labia:         Right: No tenderness or lesion.         Left: No tenderness or lesion.       Urethra: No prolapse.      Vagina: No foreign body. No vaginal discharge, tenderness, bleeding, lesions or prolapsed vaginal walls.      Cervix: Normal.      Uterus: Not deviated, not enlarged, not fixed, not tender and no uterine prolapse.       Adnexa:         Right: No mass, tenderness or fullness.          Left: No mass, tenderness or fullness.        Rectum: No external hemorrhoid.      Comments: Of note, patient with limited sensation of the vulva and vagina. This has been noted per the patient for her life. Cervix reported as hypersensitive to palpation.  Musculoskeletal:      Right lower leg: No edema.      Left lower leg: No edema.   Lymphadenopathy:      Upper Body:      Right upper body: No supraclavicular, axillary or pectoral adenopathy.      Left upper body: No supraclavicular, axillary or pectoral adenopathy.   Skin:     General: Skin is warm and dry.      Coloration: Skin is not cyanotic, jaundiced or pale.   Neurological:      General: No focal deficit present.      Mental Status: She is alert and oriented to person, place, and time.   Psychiatric:         Mood and Affect: Mood normal.         Behavior: Behavior is cooperative.                  Assessment & Plan   Assessment / Plan     Diagnoses and all orders for this visit:    1. Encounter for gynecological examination without abnormal finding (Primary)    2. Screening for malignant neoplasm of cervix  -      Liquid-based Pap Smear, Screening    3. Screening examination for venereal disease    4. Encounter for other general counseling or advice on contraception    5. Irregular menses  -     Comprehensive Metabolic Panel  -     Cortisol  -     DHEA-Sulfate  -     Estradiol  -     Follicle Stimulating Hormone  -     Testosterone  -     Progesterone  -     Luteinizing Hormone  -     Insulin, Total  -     Hemoglobin A1c    6. Acne, unspecified acne type    7. Breast pain, left  -     US Breast Bilateral Complete; Future        Discussion:   BMI Body mass index is 26.46 kg/m²..   Colonoscopy: n/a.    Mammogram: n/a.  DEXA:  n/a.  Pap smear, per ASCCP guidelines, Done today.  STI screening: declines  Contraception: partner with vastectomy    Encouraged self breast awareness.  Encouraged proactive weight management and importance of maintaining a healthy weight.   Encouraged regular exercise and the importance of same, in regards to a healthy heart as well as helping to maintain her weight and improving her mental health.      Check PCOS labs secondary to irregular menses and acne. Patient with Ehler's Danlos syndrome and this is known to cause irregular menses. Menses not bothersome to patient at this time. Check breast ultrasound as with breast tenderness to the left.     Follow-up: Return in about 1 year (around 9/28/2023), or if symptoms worsen or fail to improve, for Annual physical.    Chun Dinero MD

## 2022-09-30 LAB
GEN CATEG CVX/VAG CYTO-IMP: NORMAL
LAB AP CASE REPORT: NORMAL
LAB AP GYN ADDITIONAL INFORMATION: NORMAL
LAB AP GYN OTHER FINDINGS: NORMAL
Lab: NORMAL
PATH INTERP SPEC-IMP: NORMAL
STAT OF ADQ CVX/VAG CYTO-IMP: NORMAL

## 2022-10-14 RX ORDER — SODIUM CHLORIDE 9 MG/ML
500 INJECTION, SOLUTION INTRAVENOUS ONCE
Status: CANCELLED | OUTPATIENT
Start: 2022-10-17

## 2022-10-17 ENCOUNTER — LAB (OUTPATIENT)
Dept: LAB | Facility: HOSPITAL | Age: 27
End: 2022-10-17

## 2022-10-17 ENCOUNTER — INFUSION (OUTPATIENT)
Dept: ONCOLOGY | Facility: HOSPITAL | Age: 27
End: 2022-10-17

## 2022-10-17 ENCOUNTER — HOSPITAL ENCOUNTER (OUTPATIENT)
Dept: MRI IMAGING | Facility: HOSPITAL | Age: 27
Discharge: HOME | End: 2022-10-17

## 2022-10-17 VITALS
WEIGHT: 131 LBS | HEIGHT: 59 IN | RESPIRATION RATE: 20 BRPM | OXYGEN SATURATION: 100 % | HEART RATE: 97 BPM | DIASTOLIC BLOOD PRESSURE: 68 MMHG | SYSTOLIC BLOOD PRESSURE: 106 MMHG | BODY MASS INDEX: 26.41 KG/M2 | TEMPERATURE: 98 F

## 2022-10-17 DIAGNOSIS — R47.01 APHASIA: ICD-10-CM

## 2022-10-17 DIAGNOSIS — K31.84 GASTROPARESIS: Primary | ICD-10-CM

## 2022-10-17 DIAGNOSIS — Z95.828 PORT-A-CATH IN PLACE: ICD-10-CM

## 2022-10-17 DIAGNOSIS — R42 DIZZINESS: ICD-10-CM

## 2022-10-17 LAB
ALBUMIN SERPL-MCNC: 4.8 G/DL (ref 3.5–5.2)
ALBUMIN/GLOB SERPL: 2 G/DL
ALP SERPL-CCNC: 83 U/L (ref 39–117)
ALT SERPL W P-5'-P-CCNC: 9 U/L (ref 1–33)
ANION GAP SERPL CALCULATED.3IONS-SCNC: 13 MMOL/L (ref 5–15)
AST SERPL-CCNC: 18 U/L (ref 1–32)
BILIRUB SERPL-MCNC: 0.3 MG/DL (ref 0–1.2)
BUN SERPL-MCNC: 11 MG/DL (ref 6–20)
BUN/CREAT SERPL: 16.7 (ref 7–25)
CALCIUM SPEC-SCNC: 9.3 MG/DL (ref 8.6–10.5)
CHLORIDE SERPL-SCNC: 101 MMOL/L (ref 98–107)
CO2 SERPL-SCNC: 26 MMOL/L (ref 22–29)
CORTIS SERPL-MCNC: 1.38 MCG/DL
CREAT SERPL-MCNC: 0.66 MG/DL (ref 0.57–1)
EGFRCR SERPLBLD CKD-EPI 2021: 125.8 ML/MIN/1.73
GLOBULIN UR ELPH-MCNC: 2.4 GM/DL
GLUCOSE SERPL-MCNC: 98 MG/DL (ref 65–99)
HBA1C MFR BLD: 4.7 % (ref 4.8–5.6)
POTASSIUM SERPL-SCNC: 3.7 MMOL/L (ref 3.5–5.2)
PROT SERPL-MCNC: 7.2 G/DL (ref 6–8.5)
SODIUM SERPL-SCNC: 140 MMOL/L (ref 136–145)
TESTOST SERPL-MCNC: 31.7 NG/DL (ref 8.4–48.1)
TSH SERPL DL<=0.05 MIU/L-ACNC: 3.22 UIU/ML (ref 0.27–4.2)

## 2022-10-17 PROCEDURE — 70553 MRI BRAIN STEM W/O & W/DYE: CPT

## 2022-10-17 PROCEDURE — 83002 ASSAY OF GONADOTROPIN (LH): CPT | Performed by: OBSTETRICS & GYNECOLOGY

## 2022-10-17 PROCEDURE — 84146 ASSAY OF PROLACTIN: CPT | Performed by: OBSTETRICS & GYNECOLOGY

## 2022-10-17 PROCEDURE — 96360 HYDRATION IV INFUSION INIT: CPT

## 2022-10-17 PROCEDURE — 36415 COLL VENOUS BLD VENIPUNCTURE: CPT | Performed by: OBSTETRICS & GYNECOLOGY

## 2022-10-17 PROCEDURE — 0 GADOBENATE DIMEGLUMINE 529 MG/ML SOLUTION: Performed by: NURSE PRACTITIONER

## 2022-10-17 PROCEDURE — A9577 INJ MULTIHANCE: HCPCS | Performed by: NURSE PRACTITIONER

## 2022-10-17 PROCEDURE — 80053 COMPREHEN METABOLIC PANEL: CPT | Performed by: OBSTETRICS & GYNECOLOGY

## 2022-10-17 PROCEDURE — 82533 TOTAL CORTISOL: CPT | Performed by: OBSTETRICS & GYNECOLOGY

## 2022-10-17 PROCEDURE — 84443 ASSAY THYROID STIM HORMONE: CPT | Performed by: OBSTETRICS & GYNECOLOGY

## 2022-10-17 PROCEDURE — 82670 ASSAY OF TOTAL ESTRADIOL: CPT | Performed by: OBSTETRICS & GYNECOLOGY

## 2022-10-17 PROCEDURE — 83036 HEMOGLOBIN GLYCOSYLATED A1C: CPT | Performed by: OBSTETRICS & GYNECOLOGY

## 2022-10-17 PROCEDURE — 83001 ASSAY OF GONADOTROPIN (FSH): CPT | Performed by: OBSTETRICS & GYNECOLOGY

## 2022-10-17 PROCEDURE — 25010000002 HEPARIN LOCK FLUSH PER 10 UNITS: Performed by: ADVANCED PRACTICE MIDWIFE

## 2022-10-17 PROCEDURE — 82627 DEHYDROEPIANDROSTERONE: CPT | Performed by: OBSTETRICS & GYNECOLOGY

## 2022-10-17 PROCEDURE — 84144 ASSAY OF PROGESTERONE: CPT | Performed by: OBSTETRICS & GYNECOLOGY

## 2022-10-17 PROCEDURE — 83525 ASSAY OF INSULIN: CPT | Performed by: OBSTETRICS & GYNECOLOGY

## 2022-10-17 PROCEDURE — 84403 ASSAY OF TOTAL TESTOSTERONE: CPT | Performed by: OBSTETRICS & GYNECOLOGY

## 2022-10-17 RX ORDER — HEPARIN SODIUM (PORCINE) LOCK FLUSH IV SOLN 100 UNIT/ML 100 UNIT/ML
500 SOLUTION INTRAVENOUS AS NEEDED
Status: DISCONTINUED | OUTPATIENT
Start: 2022-10-17 | End: 2022-10-17 | Stop reason: HOSPADM

## 2022-10-17 RX ORDER — SODIUM CHLORIDE 0.9 % (FLUSH) 0.9 %
10 SYRINGE (ML) INJECTION AS NEEDED
Status: DISCONTINUED | OUTPATIENT
Start: 2022-10-17 | End: 2022-10-17 | Stop reason: HOSPADM

## 2022-10-17 RX ORDER — SODIUM CHLORIDE 0.9 % (FLUSH) 0.9 %
10 SYRINGE (ML) INJECTION AS NEEDED
Status: CANCELLED | OUTPATIENT
Start: 2022-10-17

## 2022-10-17 RX ORDER — SODIUM CHLORIDE 9 MG/ML
500 INJECTION, SOLUTION INTRAVENOUS ONCE
Status: COMPLETED | OUTPATIENT
Start: 2022-10-17 | End: 2022-10-17

## 2022-10-17 RX ORDER — SODIUM CHLORIDE 9 MG/ML
500 INJECTION, SOLUTION INTRAVENOUS ONCE
Status: CANCELLED | OUTPATIENT
Start: 2022-10-24

## 2022-10-17 RX ORDER — HEPARIN SODIUM (PORCINE) LOCK FLUSH IV SOLN 100 UNIT/ML 100 UNIT/ML
500 SOLUTION INTRAVENOUS AS NEEDED
Status: CANCELLED | OUTPATIENT
Start: 2022-10-17

## 2022-10-17 RX ADMIN — SODIUM CHLORIDE 500 ML/HR: 9 INJECTION, SOLUTION INTRAVENOUS at 08:41

## 2022-10-17 RX ADMIN — Medication 10 ML: at 11:44

## 2022-10-17 RX ADMIN — HEPARIN SODIUM (PORCINE) LOCK FLUSH IV SOLN 100 UNIT/ML 500 UNITS: 100 SOLUTION at 11:44

## 2022-10-17 RX ADMIN — Medication 10 ML: at 10:05

## 2022-10-17 RX ADMIN — GADOBENATE DIMEGLUMINE 10 ML: 529 INJECTION, SOLUTION INTRAVENOUS at 11:28

## 2022-10-17 RX ADMIN — Medication 500 UNITS: at 10:05

## 2022-10-18 ENCOUNTER — TELEPHONE (OUTPATIENT)
Dept: OBSTETRICS AND GYNECOLOGY | Facility: CLINIC | Age: 27
End: 2022-10-18

## 2022-10-18 LAB
ESTRADIOL SERPL HS-MCNC: 17.6 PG/ML
FSH SERPL-ACNC: 2.35 MIU/ML
LH SERPL-ACNC: 0.75 MIU/ML
PROGEST SERPL-MCNC: 0.15 NG/ML
PROLACTIN SERPL-MCNC: 207 NG/ML (ref 4.79–23.3)

## 2022-10-18 NOTE — TELEPHONE ENCOUNTER
Called patient to review recent lab results. Prolactin elevated. Of note, patient reports that this is likely from her domperidone for her gastroparesis, which can cause a drug induced hyperprolactinemia. MRIs of the brain thus far have not noted any abnormalities of the pituitary. No other complaints.

## 2022-10-19 ENCOUNTER — OFFICE VISIT (OUTPATIENT)
Dept: GASTROENTEROLOGY | Facility: CLINIC | Age: 27
End: 2022-10-19

## 2022-10-19 VITALS
SYSTOLIC BLOOD PRESSURE: 114 MMHG | WEIGHT: 134 LBS | HEART RATE: 98 BPM | BODY MASS INDEX: 26.31 KG/M2 | OXYGEN SATURATION: 98 % | TEMPERATURE: 97.7 F | DIASTOLIC BLOOD PRESSURE: 62 MMHG | HEIGHT: 60 IN

## 2022-10-19 DIAGNOSIS — K21.9 GASTROESOPHAGEAL REFLUX DISEASE, UNSPECIFIED WHETHER ESOPHAGITIS PRESENT: Primary | ICD-10-CM

## 2022-10-19 DIAGNOSIS — R13.10 DYSPHAGIA, UNSPECIFIED TYPE: ICD-10-CM

## 2022-10-19 PROCEDURE — 99214 OFFICE O/P EST MOD 30 MIN: CPT | Performed by: NURSE PRACTITIONER

## 2022-10-19 NOTE — PROGRESS NOTES
Chief Complaint   Patient presents with   • GI Problem     C/o heartburn       PCP: Ju Garibay APRN  REFER: No ref. provider found    Subjective     HPI    Nai Golden presents to office with complain of burning in esophagus.  Burning is not always related to eating.  She will sometimes experience burning when she wakes up in morning.  She has difficulty with dysphagia and feels food become stuck in mid esophagus.  Time or repeated drinking provides relief.  She has experienced 30 lb weight loss over 3-4 months.  She states her gastroparesis has been bad.    Constant nausea, she does not throw up.  She had increased use of excedrin for apx one month (July) but stopped after starting a shot to help with migraines.  Denies frequent use of NSAIDs.   She is compliant with daily pepcid.        COLONOSCOPY (2020 09:36)-normal     UPPER GI ENDOSCOPY (2017 09:08)-esophagitis, Dr Berman      Past Medical History:   Diagnosis Date   • Anemia    • Depression    • Dyspepsia    • Samantha-Danlos syndrome     2018   • Gastroparesis    • GERD (gastroesophageal reflux disease)    • IBS (irritable bowel syndrome)    • PONV (postoperative nausea and vomiting)    • Postural orthostatic tachycardia syndrome    • Scoliosis deformity of spine 2017       Past Surgical History:   Procedure Laterality Date   •  SECTION     • CHOLECYSTECTOMY     • COLONOSCOPY N/A 2020    Procedure: COLONOSCOPY WITH ANESTHESIA;  Surgeon: Ronaldo Cole DO;  Location: [facility] ENDOSCOPY;  Service: Gastroenterology;  Laterality: N/A;  preop; altered bowel  postop; normal   PCP Ju Garibay,    • ENDOSCOPY  2016   • ENDOSCOPY N/A 2017    Procedure: ESOPHAGOGASTRODUODENOSCOPY possible dilation ;  Surgeon: Franky Berman MD;  Location: [facility] ENDOSCOPY;  Service:    • TONSILLECTOMY     • VENOUS ACCESS DEVICE (PORT) INSERTION N/A 2022    Procedure: INSERTION VENOUS ACCESS DEVICE  (PORT-A-CATH);  Surgeon: Jordan Baez MD;  Location: [facility] HYBRID OR 12;  Service: Vascular;  Laterality: N/A;       Outpatient Medications Marked as Taking for the 10/19/22 encounter (Office Visit) with Atul Rothman APRN   Medication Sig Dispense Refill   • cyanocobalamin 1000 MCG/ML injection Inject 1 mL into the appropriate muscle as directed by prescriber Every 14 (Fourteen) Days. (Patient taking differently: Inject 1 mL into the appropriate muscle as directed by prescriber Every 14 (Fourteen) Days. PT STATES SHE IS NOW TAKING ONCE A MONTH) 2 mL 8   • famotidine (PEPCID) 40 MG tablet Take 1 tablet by mouth 2 (Two) Times a Day.     • Galcanezumab-gnlm (Emgality) 120 MG/ML solution prefilled syringe Inject 1 mL under the skin into the appropriate area as directed Every 30 (Thirty) Days. 1 mL 5   • Lidocaine, Anorectal, (LC-5 Lidocaine) 5 % cream cream Apply Daily As Needed for port access. 45 g 1   • NON FORMULARY 10 mg 4 (Four) Times a Day. Domperidone     • ondansetron (ZOFRAN) 8 MG tablet Take 1 tablet by mouth Every 6 (Six) Hours to 8 (Eight) As Needed. 60 tablet 2   • Plecanatide (Trulance) 3 MG tablet Take 1 tablet by mouth Daily. 30 tablet 3   • ubrogepant (ubrogepant) 100 MG tablet Take 1 tablet by mouth once daily. May take 2nd dose at least 2 hours after first dose as needed. **Max 2 tabs (200mg) in 24 hours** 30 tablet 5       Allergies   Allergen Reactions   • Adhesive Tape Rash   • Compazine [Prochlorperazine Edisylate] Rash   • Percocet [Oxycodone-Acetaminophen] Rash   • Scopolamine Rash       Social History     Socioeconomic History   • Marital status:    Tobacco Use   • Smoking status: Never   • Smokeless tobacco: Never   Vaping Use   • Vaping Use: Never used   Substance and Sexual Activity   • Alcohol use: No   • Drug use: No   • Sexual activity: Yes     Partners: Male     Birth control/protection: Vasectomy       Review of Systems   Constitutional: Negative for  "unexpected weight change.   Respiratory: Negative for shortness of breath.    Cardiovascular: Negative for chest pain.   Gastrointestinal: Negative for abdominal pain and anal bleeding.       Objective     Vitals:    10/19/22 1418   BP: 114/62   Pulse: 98   Temp: 97.7 °F (36.5 °C)   SpO2: 98%   Weight: 60.8 kg (134 lb)   Height: 151.1 cm (59.5\")     Body mass index is 26.61 kg/m².    Physical Exam  Constitutional:       Appearance: Normal appearance. She is well-developed.   Eyes:      General: No scleral icterus.  Cardiovascular:      Rate and Rhythm: Regular rhythm.      Heart sounds: Normal heart sounds. No murmur heard.  Pulmonary:      Effort: Pulmonary effort is normal. No accessory muscle usage.      Breath sounds: Normal breath sounds.   Abdominal:      General: Bowel sounds are normal. There is no distension.      Palpations: Abdomen is soft. There is no mass.      Tenderness: There is no abdominal tenderness. There is no guarding or rebound.   Skin:     General: Skin is warm and dry.      Coloration: Skin is not jaundiced.   Neurological:      Mental Status: She is alert.   Psychiatric:         Behavior: Behavior is cooperative.         Imaging Results (Most Recent)     None          Body mass index is 26.61 kg/m².    Assessment & Plan     Diagnoses and all orders for this visit:    1. Gastroesophageal reflux disease, unspecified whether esophagitis present (Primary)  -     Case Request; Standing  -     Case Request    2. Dysphagia, unspecified type    Other orders  -     Implement Anesthesia Orders Day of Procedure; Standing  -     Obtain Informed Consent; Standing         ESOPHAGOGASTRODUODENOSCOPY WITH ANESTHESIA (N/A)      Continue bid pepcid as previously prescribed  Decrease caffeine, nicotine, etoh- all contribute to acid reflux  Do not eat 2-3 hours within lying down, elevated HOB 4-6 inches    Advised pt to stop use of NSAIDs, Fish Oil, and MV 5 days prior to procedure, per Dr Cole protocol.  " Tylenol based products are ok to take.  Pt verbalized understanding.    The risks of the endoscopy were discussed in detail.  We discussed the risks of perforation (one out of 2105-8328, riskier with dilation), bleeding (one out of 500), and the rare risks of infection, adverse reaction to anesthesia, respiratory failure, cardiac failure including MI and adverse reaction to medications, etc.  We discussed consequences that could occur if a risk were to develop such as the need for hospitalization, blood transfusion, surgical intervention, medications, pain and disability and death.  Alternatives include not doing anything, or pursuing an UGI series which only offers a diagnosis with potential less accuracy compared to EGD.  The patient verbalizes understanding and agrees to proceed.    Atul Rothman, APRN  10/19/22          There are no Patient Instructions on file for this visit.

## 2022-10-20 LAB
DHEA-S SERPL-MCNC: 293 UG/DL (ref 110–431.7)
INSULIN SERPL-ACNC: 16.7 UIU/ML (ref 2.6–24.9)

## 2022-10-24 ENCOUNTER — INFUSION (OUTPATIENT)
Dept: ONCOLOGY | Facility: HOSPITAL | Age: 27
End: 2022-10-24

## 2022-10-24 VITALS
BODY MASS INDEX: 26.81 KG/M2 | TEMPERATURE: 97.7 F | HEART RATE: 90 BPM | RESPIRATION RATE: 17 BRPM | DIASTOLIC BLOOD PRESSURE: 63 MMHG | OXYGEN SATURATION: 100 % | SYSTOLIC BLOOD PRESSURE: 106 MMHG | HEIGHT: 59 IN | WEIGHT: 133 LBS

## 2022-10-24 DIAGNOSIS — Z95.828 PORT-A-CATH IN PLACE: Primary | ICD-10-CM

## 2022-10-24 DIAGNOSIS — K31.84 GASTROPARESIS: ICD-10-CM

## 2022-10-24 PROCEDURE — 25010000002 HEPARIN LOCK FLUSH PER 10 UNITS: Performed by: ADVANCED PRACTICE MIDWIFE

## 2022-10-24 PROCEDURE — 96360 HYDRATION IV INFUSION INIT: CPT

## 2022-10-24 PROCEDURE — 96361 HYDRATE IV INFUSION ADD-ON: CPT

## 2022-10-24 RX ORDER — SODIUM CHLORIDE 0.9 % (FLUSH) 0.9 %
10 SYRINGE (ML) INJECTION AS NEEDED
Status: CANCELLED | OUTPATIENT
Start: 2022-10-24

## 2022-10-24 RX ORDER — HEPARIN SODIUM (PORCINE) LOCK FLUSH IV SOLN 100 UNIT/ML 100 UNIT/ML
500 SOLUTION INTRAVENOUS AS NEEDED
Status: CANCELLED | OUTPATIENT
Start: 2022-10-24

## 2022-10-24 RX ORDER — SODIUM CHLORIDE 0.9 % (FLUSH) 0.9 %
10 SYRINGE (ML) INJECTION AS NEEDED
Status: DISCONTINUED | OUTPATIENT
Start: 2022-10-24 | End: 2022-10-24 | Stop reason: HOSPADM

## 2022-10-24 RX ORDER — SODIUM CHLORIDE 9 MG/ML
500 INJECTION, SOLUTION INTRAVENOUS ONCE
Status: COMPLETED | OUTPATIENT
Start: 2022-10-24 | End: 2022-10-24

## 2022-10-24 RX ORDER — HEPARIN SODIUM (PORCINE) LOCK FLUSH IV SOLN 100 UNIT/ML 100 UNIT/ML
500 SOLUTION INTRAVENOUS AS NEEDED
Status: DISCONTINUED | OUTPATIENT
Start: 2022-10-24 | End: 2022-10-24 | Stop reason: HOSPADM

## 2022-10-24 RX ORDER — SODIUM CHLORIDE 9 MG/ML
500 INJECTION, SOLUTION INTRAVENOUS ONCE
Status: CANCELLED | OUTPATIENT
Start: 2022-10-31

## 2022-10-24 RX ADMIN — SODIUM CHLORIDE 500 ML/HR: 9 INJECTION, SOLUTION INTRAVENOUS at 08:15

## 2022-10-24 RX ADMIN — Medication 10 ML: at 12:19

## 2022-10-24 RX ADMIN — Medication 500 UNITS: at 12:19

## 2022-10-27 RX ORDER — SODIUM CHLORIDE 9 MG/ML
2000 INJECTION, SOLUTION INTRAVENOUS ONCE
Status: CANCELLED | OUTPATIENT
Start: 2022-10-27

## 2022-10-31 ENCOUNTER — INFUSION (OUTPATIENT)
Dept: ONCOLOGY | Facility: HOSPITAL | Age: 27
End: 2022-10-31

## 2022-10-31 VITALS
OXYGEN SATURATION: 100 % | RESPIRATION RATE: 17 BRPM | WEIGHT: 134.8 LBS | HEIGHT: 59 IN | BODY MASS INDEX: 27.17 KG/M2 | SYSTOLIC BLOOD PRESSURE: 115 MMHG | DIASTOLIC BLOOD PRESSURE: 70 MMHG | TEMPERATURE: 97.8 F | HEART RATE: 99 BPM

## 2022-10-31 DIAGNOSIS — K31.84 GASTROPARESIS: ICD-10-CM

## 2022-10-31 DIAGNOSIS — Z95.828 PORT-A-CATH IN PLACE: Primary | ICD-10-CM

## 2022-10-31 PROCEDURE — 25010000002 HEPARIN LOCK FLUSH PER 10 UNITS: Performed by: ADVANCED PRACTICE MIDWIFE

## 2022-10-31 PROCEDURE — 96361 HYDRATE IV INFUSION ADD-ON: CPT

## 2022-10-31 PROCEDURE — 96360 HYDRATION IV INFUSION INIT: CPT

## 2022-10-31 RX ORDER — SODIUM CHLORIDE 9 MG/ML
2000 INJECTION, SOLUTION INTRAVENOUS ONCE
Status: COMPLETED | OUTPATIENT
Start: 2022-10-31 | End: 2022-10-31

## 2022-10-31 RX ORDER — HEPARIN SODIUM (PORCINE) LOCK FLUSH IV SOLN 100 UNIT/ML 100 UNIT/ML
500 SOLUTION INTRAVENOUS AS NEEDED
Status: CANCELLED | OUTPATIENT
Start: 2022-10-31

## 2022-10-31 RX ORDER — SODIUM CHLORIDE 0.9 % (FLUSH) 0.9 %
10 SYRINGE (ML) INJECTION AS NEEDED
Status: CANCELLED | OUTPATIENT
Start: 2022-10-31

## 2022-10-31 RX ORDER — SODIUM CHLORIDE 0.9 % (FLUSH) 0.9 %
10 SYRINGE (ML) INJECTION AS NEEDED
Status: DISCONTINUED | OUTPATIENT
Start: 2022-10-31 | End: 2022-10-31 | Stop reason: HOSPADM

## 2022-10-31 RX ORDER — HEPARIN SODIUM (PORCINE) LOCK FLUSH IV SOLN 100 UNIT/ML 100 UNIT/ML
500 SOLUTION INTRAVENOUS AS NEEDED
Status: DISCONTINUED | OUTPATIENT
Start: 2022-10-31 | End: 2022-10-31 | Stop reason: HOSPADM

## 2022-10-31 RX ORDER — SODIUM CHLORIDE 9 MG/ML
2000 INJECTION, SOLUTION INTRAVENOUS ONCE
Status: CANCELLED | OUTPATIENT
Start: 2022-11-01

## 2022-10-31 RX ADMIN — Medication 500 UNITS: at 11:50

## 2022-10-31 RX ADMIN — SODIUM CHLORIDE 2000 ML: 9 INJECTION, SOLUTION INTRAVENOUS at 08:28

## 2022-10-31 RX ADMIN — Medication 10 ML: at 11:50

## 2022-11-07 ENCOUNTER — INFUSION (OUTPATIENT)
Dept: ONCOLOGY | Facility: HOSPITAL | Age: 27
End: 2022-11-07

## 2022-11-07 VITALS
HEIGHT: 59 IN | WEIGHT: 134 LBS | DIASTOLIC BLOOD PRESSURE: 54 MMHG | HEART RATE: 89 BPM | RESPIRATION RATE: 16 BRPM | TEMPERATURE: 97.3 F | OXYGEN SATURATION: 95 % | BODY MASS INDEX: 27.01 KG/M2 | SYSTOLIC BLOOD PRESSURE: 90 MMHG

## 2022-11-07 DIAGNOSIS — K31.84 GASTROPARESIS: ICD-10-CM

## 2022-11-07 DIAGNOSIS — Z95.828 PORT-A-CATH IN PLACE: Primary | ICD-10-CM

## 2022-11-07 PROCEDURE — 96360 HYDRATION IV INFUSION INIT: CPT

## 2022-11-07 PROCEDURE — 96361 HYDRATE IV INFUSION ADD-ON: CPT

## 2022-11-07 PROCEDURE — 25010000002 HEPARIN LOCK FLUSH PER 10 UNITS: Performed by: ADVANCED PRACTICE MIDWIFE

## 2022-11-07 RX ORDER — SODIUM CHLORIDE 0.9 % (FLUSH) 0.9 %
10 SYRINGE (ML) INJECTION AS NEEDED
Status: CANCELLED | OUTPATIENT
Start: 2022-11-07

## 2022-11-07 RX ORDER — SODIUM CHLORIDE 9 MG/ML
2000 INJECTION, SOLUTION INTRAVENOUS ONCE
Status: COMPLETED | OUTPATIENT
Start: 2022-11-07 | End: 2022-11-07

## 2022-11-07 RX ORDER — SODIUM CHLORIDE 9 MG/ML
2000 INJECTION, SOLUTION INTRAVENOUS ONCE
Status: CANCELLED | OUTPATIENT
Start: 2022-11-08

## 2022-11-07 RX ORDER — HEPARIN SODIUM (PORCINE) LOCK FLUSH IV SOLN 100 UNIT/ML 100 UNIT/ML
500 SOLUTION INTRAVENOUS AS NEEDED
Status: CANCELLED | OUTPATIENT
Start: 2022-11-07

## 2022-11-07 RX ORDER — HEPARIN SODIUM (PORCINE) LOCK FLUSH IV SOLN 100 UNIT/ML 100 UNIT/ML
500 SOLUTION INTRAVENOUS AS NEEDED
Status: DISCONTINUED | OUTPATIENT
Start: 2022-11-07 | End: 2022-11-07 | Stop reason: HOSPADM

## 2022-11-07 RX ORDER — SODIUM CHLORIDE 0.9 % (FLUSH) 0.9 %
10 SYRINGE (ML) INJECTION AS NEEDED
Status: DISCONTINUED | OUTPATIENT
Start: 2022-11-07 | End: 2022-11-07 | Stop reason: HOSPADM

## 2022-11-07 RX ADMIN — HEPARIN SODIUM (PORCINE) LOCK FLUSH IV SOLN 100 UNIT/ML 500 UNITS: 100 SOLUTION at 10:12

## 2022-11-07 RX ADMIN — SODIUM CHLORIDE 2000 ML: 9 INJECTION, SOLUTION INTRAVENOUS at 08:08

## 2022-11-07 RX ADMIN — Medication 10 ML: at 10:12

## 2022-11-17 ENCOUNTER — INFUSION (OUTPATIENT)
Dept: ONCOLOGY | Facility: HOSPITAL | Age: 27
End: 2022-11-17

## 2022-11-17 VITALS
TEMPERATURE: 97.5 F | DIASTOLIC BLOOD PRESSURE: 63 MMHG | OXYGEN SATURATION: 100 % | HEART RATE: 87 BPM | SYSTOLIC BLOOD PRESSURE: 109 MMHG

## 2022-11-17 DIAGNOSIS — K31.84 GASTROPARESIS: ICD-10-CM

## 2022-11-17 DIAGNOSIS — Z95.828 PORT-A-CATH IN PLACE: Primary | ICD-10-CM

## 2022-11-17 PROCEDURE — 96360 HYDRATION IV INFUSION INIT: CPT

## 2022-11-17 PROCEDURE — 96361 HYDRATE IV INFUSION ADD-ON: CPT

## 2022-11-17 PROCEDURE — 25010000002 HEPARIN LOCK FLUSH PER 10 UNITS: Performed by: ADVANCED PRACTICE MIDWIFE

## 2022-11-17 RX ORDER — HEPARIN SODIUM (PORCINE) LOCK FLUSH IV SOLN 100 UNIT/ML 100 UNIT/ML
500 SOLUTION INTRAVENOUS AS NEEDED
Status: DISCONTINUED | OUTPATIENT
Start: 2022-11-17 | End: 2022-11-17 | Stop reason: HOSPADM

## 2022-11-17 RX ORDER — SODIUM CHLORIDE 0.9 % (FLUSH) 0.9 %
10 SYRINGE (ML) INJECTION AS NEEDED
Status: CANCELLED | OUTPATIENT
Start: 2022-11-17

## 2022-11-17 RX ORDER — SODIUM CHLORIDE 0.9 % (FLUSH) 0.9 %
10 SYRINGE (ML) INJECTION AS NEEDED
Status: DISCONTINUED | OUTPATIENT
Start: 2022-11-17 | End: 2022-11-17 | Stop reason: HOSPADM

## 2022-11-17 RX ORDER — SODIUM CHLORIDE 9 MG/ML
2000 INJECTION, SOLUTION INTRAVENOUS ONCE
Status: COMPLETED | OUTPATIENT
Start: 2022-11-17 | End: 2022-11-17

## 2022-11-17 RX ORDER — SODIUM CHLORIDE 9 MG/ML
2000 INJECTION, SOLUTION INTRAVENOUS ONCE
Status: CANCELLED | OUTPATIENT
Start: 2022-11-18

## 2022-11-17 RX ORDER — HEPARIN SODIUM (PORCINE) LOCK FLUSH IV SOLN 100 UNIT/ML 100 UNIT/ML
500 SOLUTION INTRAVENOUS AS NEEDED
Status: CANCELLED | OUTPATIENT
Start: 2022-11-17

## 2022-11-17 RX ADMIN — HEPARIN SODIUM (PORCINE) LOCK FLUSH IV SOLN 100 UNIT/ML 500 UNITS: 100 SOLUTION at 10:24

## 2022-11-17 RX ADMIN — Medication 10 ML: at 10:23

## 2022-11-17 RX ADMIN — SODIUM CHLORIDE 2000 ML: 9 INJECTION, SOLUTION INTRAVENOUS at 08:22

## 2022-11-21 ENCOUNTER — INFUSION (OUTPATIENT)
Dept: ONCOLOGY | Facility: HOSPITAL | Age: 27
End: 2022-11-21

## 2022-11-21 VITALS
BODY MASS INDEX: 27.01 KG/M2 | WEIGHT: 134 LBS | RESPIRATION RATE: 16 BRPM | HEIGHT: 59 IN | HEART RATE: 105 BPM | TEMPERATURE: 97.9 F | SYSTOLIC BLOOD PRESSURE: 106 MMHG | OXYGEN SATURATION: 100 % | DIASTOLIC BLOOD PRESSURE: 63 MMHG

## 2022-11-21 DIAGNOSIS — Z95.828 PORT-A-CATH IN PLACE: Primary | ICD-10-CM

## 2022-11-21 DIAGNOSIS — K31.84 GASTROPARESIS: ICD-10-CM

## 2022-11-21 PROCEDURE — 96360 HYDRATION IV INFUSION INIT: CPT

## 2022-11-21 PROCEDURE — 96361 HYDRATE IV INFUSION ADD-ON: CPT

## 2022-11-21 PROCEDURE — 25010000002 HEPARIN LOCK FLUSH PER 10 UNITS: Performed by: ADVANCED PRACTICE MIDWIFE

## 2022-11-21 RX ORDER — HEPARIN SODIUM (PORCINE) LOCK FLUSH IV SOLN 100 UNIT/ML 100 UNIT/ML
500 SOLUTION INTRAVENOUS AS NEEDED
Status: DISCONTINUED | OUTPATIENT
Start: 2022-11-21 | End: 2022-11-21 | Stop reason: HOSPADM

## 2022-11-21 RX ORDER — SODIUM CHLORIDE 0.9 % (FLUSH) 0.9 %
10 SYRINGE (ML) INJECTION AS NEEDED
Status: DISCONTINUED | OUTPATIENT
Start: 2022-11-21 | End: 2022-11-21 | Stop reason: HOSPADM

## 2022-11-21 RX ORDER — SODIUM CHLORIDE 9 MG/ML
2000 INJECTION, SOLUTION INTRAVENOUS ONCE
Status: CANCELLED | OUTPATIENT
Start: 2022-11-24

## 2022-11-21 RX ORDER — SODIUM CHLORIDE 9 MG/ML
2000 INJECTION, SOLUTION INTRAVENOUS ONCE
Status: COMPLETED | OUTPATIENT
Start: 2022-11-21 | End: 2022-11-21

## 2022-11-21 RX ORDER — HEPARIN SODIUM (PORCINE) LOCK FLUSH IV SOLN 100 UNIT/ML 100 UNIT/ML
500 SOLUTION INTRAVENOUS AS NEEDED
Status: CANCELLED | OUTPATIENT
Start: 2022-11-21

## 2022-11-21 RX ORDER — SODIUM CHLORIDE 0.9 % (FLUSH) 0.9 %
10 SYRINGE (ML) INJECTION AS NEEDED
Status: CANCELLED | OUTPATIENT
Start: 2022-11-21

## 2022-11-21 RX ADMIN — Medication 10 ML: at 10:09

## 2022-11-21 RX ADMIN — HEPARIN SODIUM (PORCINE) LOCK FLUSH IV SOLN 100 UNIT/ML 500 UNITS: 100 SOLUTION at 10:09

## 2022-11-21 RX ADMIN — SODIUM CHLORIDE 2000 ML: 9 INJECTION, SOLUTION INTRAVENOUS at 08:06

## 2022-11-28 ENCOUNTER — ANESTHESIA (OUTPATIENT)
Dept: GASTROENTEROLOGY | Facility: HOSPITAL | Age: 27
End: 2022-11-28

## 2022-11-28 ENCOUNTER — ANESTHESIA EVENT (OUTPATIENT)
Dept: GASTROENTEROLOGY | Facility: HOSPITAL | Age: 27
End: 2022-11-28

## 2022-11-28 ENCOUNTER — HOSPITAL ENCOUNTER (OUTPATIENT)
Facility: HOSPITAL | Age: 27
Setting detail: HOSPITAL OUTPATIENT SURGERY
Discharge: HOME | End: 2022-11-28
Attending: INTERNAL MEDICINE | Admitting: INTERNAL MEDICINE

## 2022-11-28 VITALS
SYSTOLIC BLOOD PRESSURE: 98 MMHG | TEMPERATURE: 96.9 F | WEIGHT: 132 LBS | RESPIRATION RATE: 17 BRPM | DIASTOLIC BLOOD PRESSURE: 66 MMHG | HEART RATE: 96 BPM | OXYGEN SATURATION: 100 % | BODY MASS INDEX: 26.61 KG/M2 | HEIGHT: 59 IN

## 2022-11-28 DIAGNOSIS — K21.9 GASTROESOPHAGEAL REFLUX DISEASE, UNSPECIFIED WHETHER ESOPHAGITIS PRESENT: ICD-10-CM

## 2022-11-28 LAB — B-HCG UR QL: NEGATIVE

## 2022-11-28 PROCEDURE — 81025 URINE PREGNANCY TEST: CPT | Performed by: ANESTHESIOLOGY

## 2022-11-28 PROCEDURE — 43239 EGD BIOPSY SINGLE/MULTIPLE: CPT | Performed by: INTERNAL MEDICINE

## 2022-11-28 PROCEDURE — 25010000002 PROPOFOL 10 MG/ML EMULSION

## 2022-11-28 PROCEDURE — 87081 CULTURE SCREEN ONLY: CPT | Performed by: INTERNAL MEDICINE

## 2022-11-28 RX ORDER — PROPOFOL 10 MG/ML
VIAL (ML) INTRAVENOUS AS NEEDED
Status: DISCONTINUED | OUTPATIENT
Start: 2022-11-28 | End: 2022-11-28 | Stop reason: SURG

## 2022-11-28 RX ORDER — SODIUM CHLORIDE 0.9 % (FLUSH) 0.9 %
10 SYRINGE (ML) INJECTION AS NEEDED
Status: DISCONTINUED | OUTPATIENT
Start: 2022-11-28 | End: 2022-11-28 | Stop reason: HOSPADM

## 2022-11-28 RX ORDER — LIDOCAINE HYDROCHLORIDE 20 MG/ML
INJECTION, SOLUTION EPIDURAL; INFILTRATION; INTRACAUDAL; PERINEURAL AS NEEDED
Status: DISCONTINUED | OUTPATIENT
Start: 2022-11-28 | End: 2022-11-28 | Stop reason: SURG

## 2022-11-28 RX ORDER — SODIUM CHLORIDE 9 MG/ML
500 INJECTION, SOLUTION INTRAVENOUS CONTINUOUS PRN
Status: DISCONTINUED | OUTPATIENT
Start: 2022-11-28 | End: 2022-11-28 | Stop reason: HOSPADM

## 2022-11-28 RX ORDER — LIDOCAINE HYDROCHLORIDE 10 MG/ML
0.5 INJECTION, SOLUTION EPIDURAL; INFILTRATION; INTRACAUDAL; PERINEURAL ONCE AS NEEDED
Status: DISCONTINUED | OUTPATIENT
Start: 2022-11-28 | End: 2022-11-28 | Stop reason: HOSPADM

## 2022-11-28 RX ADMIN — PROPOFOL 200 MG: 10 INJECTION, EMULSION INTRAVENOUS at 09:57

## 2022-11-28 RX ADMIN — LIDOCAINE HYDROCHLORIDE 80 MG: 20 INJECTION, SOLUTION EPIDURAL; INFILTRATION; INTRACAUDAL; PERINEURAL at 09:57

## 2022-11-28 RX ADMIN — SODIUM CHLORIDE 500 ML: 9 INJECTION, SOLUTION INTRAVENOUS at 08:38

## 2022-11-28 NOTE — ANESTHESIA PREPROCEDURE EVALUATION
Anesthesia Evaluation     Patient summary reviewed   no history of anesthetic complications:  NPO Solid Status: > 8 hours             Airway   Mallampati: I  Dental      Pulmonary - negative pulmonary ROS   Cardiovascular - negative cardio ROS  Exercise tolerance: excellent (>7 METS)      ROS comment: POTS    Neuro/Psych- negative ROS  GI/Hepatic/Renal/Endo    (+)  GERD,      ROS Comment: Gastroparesis    Musculoskeletal     Abdominal    Substance History      OB/GYN          Other          Other Comment: Samantha-danseema                  Anesthesia Plan    ASA 2     MAC       Anesthetic plan, risks, benefits, and alternatives have been provided, discussed and informed consent has been obtained with: patient.        CODE STATUS:

## 2022-11-28 NOTE — ANESTHESIA POSTPROCEDURE EVALUATION
Patient: Nai Golden    Procedure Summary     Date: 11/28/22 Room / Location: [facility] ENDOSCOPY 4 / [facility] ENDOSCOPY    Anesthesia Start: 0946 Anesthesia Stop: 1004    Procedure: ESOPHAGOGASTRODUODENOSCOPY WITH ANESTHESIA Diagnosis:       Gastroesophageal reflux disease, unspecified whether esophagitis present      (Gastroesophageal reflux disease, unspecified whether esophagitis present [K21.9])    Surgeons: Ronaldo Cole DO Provider: Ole Villafana CRNA    Anesthesia Type: MAC ASA Status: 2          Anesthesia Type: MAC    Vitals  No vitals data found for the desired time range.          Post Anesthesia Care and Evaluation    Patient location during evaluation: PHASE II  Patient participation: complete - patient participated  Level of consciousness: awake and alert  Pain score: 0    Airway patency: patent  Anesthetic complications: No anesthetic complications  PONV Status: none  Cardiovascular status: acceptable  Respiratory status: acceptable  Hydration status: acceptable  No anesthesia care post op

## 2022-11-29 ENCOUNTER — APPOINTMENT (OUTPATIENT)
Dept: ONCOLOGY | Facility: HOSPITAL | Age: 27
End: 2022-11-29

## 2022-11-29 LAB — UREASE TISS QL: NEGATIVE

## 2022-12-02 ENCOUNTER — INFUSION (OUTPATIENT)
Dept: ONCOLOGY | Facility: HOSPITAL | Age: 27
End: 2022-12-02
Payer: COMMERCIAL

## 2022-12-02 VITALS
TEMPERATURE: 97.9 F | SYSTOLIC BLOOD PRESSURE: 106 MMHG | DIASTOLIC BLOOD PRESSURE: 64 MMHG | RESPIRATION RATE: 18 BRPM | OXYGEN SATURATION: 100 % | HEIGHT: 59 IN | HEART RATE: 87 BPM | BODY MASS INDEX: 26.81 KG/M2 | WEIGHT: 133 LBS

## 2022-12-02 DIAGNOSIS — Z95.828 PORT-A-CATH IN PLACE: Primary | ICD-10-CM

## 2022-12-02 DIAGNOSIS — K31.84 GASTROPARESIS: ICD-10-CM

## 2022-12-02 PROCEDURE — 25010000002 HEPARIN LOCK FLUSH PER 10 UNITS: Performed by: ADVANCED PRACTICE MIDWIFE

## 2022-12-02 PROCEDURE — 96361 HYDRATE IV INFUSION ADD-ON: CPT

## 2022-12-02 PROCEDURE — 96360 HYDRATION IV INFUSION INIT: CPT

## 2022-12-02 RX ORDER — HEPARIN SODIUM (PORCINE) LOCK FLUSH IV SOLN 100 UNIT/ML 100 UNIT/ML
500 SOLUTION INTRAVENOUS AS NEEDED
Status: CANCELLED | OUTPATIENT
Start: 2022-12-02

## 2022-12-02 RX ORDER — SODIUM CHLORIDE 0.9 % (FLUSH) 0.9 %
10 SYRINGE (ML) INJECTION AS NEEDED
Status: DISCONTINUED | OUTPATIENT
Start: 2022-12-02 | End: 2022-12-02 | Stop reason: HOSPADM

## 2022-12-02 RX ORDER — SODIUM CHLORIDE 9 MG/ML
2000 INJECTION, SOLUTION INTRAVENOUS ONCE
Status: CANCELLED | OUTPATIENT
Start: 2022-12-03

## 2022-12-02 RX ORDER — HEPARIN SODIUM (PORCINE) LOCK FLUSH IV SOLN 100 UNIT/ML 100 UNIT/ML
500 SOLUTION INTRAVENOUS AS NEEDED
Status: DISCONTINUED | OUTPATIENT
Start: 2022-12-02 | End: 2022-12-02 | Stop reason: HOSPADM

## 2022-12-02 RX ORDER — SODIUM CHLORIDE 0.9 % (FLUSH) 0.9 %
10 SYRINGE (ML) INJECTION AS NEEDED
Status: CANCELLED | OUTPATIENT
Start: 2022-12-02

## 2022-12-02 RX ORDER — SODIUM CHLORIDE 9 MG/ML
2000 INJECTION, SOLUTION INTRAVENOUS ONCE
Status: COMPLETED | OUTPATIENT
Start: 2022-12-02 | End: 2022-12-02

## 2022-12-02 RX ADMIN — Medication 10 ML: at 10:34

## 2022-12-02 RX ADMIN — HEPARIN SODIUM (PORCINE) LOCK FLUSH IV SOLN 100 UNIT/ML 500 UNITS: 100 SOLUTION at 10:34

## 2022-12-02 RX ADMIN — SODIUM CHLORIDE 2000 ML: 9 INJECTION, SOLUTION INTRAVENOUS at 08:15

## 2022-12-09 ENCOUNTER — INFUSION (OUTPATIENT)
Dept: ONCOLOGY | Facility: HOSPITAL | Age: 27
End: 2022-12-09
Payer: COMMERCIAL

## 2022-12-09 VITALS
HEART RATE: 91 BPM | HEIGHT: 59 IN | WEIGHT: 134 LBS | BODY MASS INDEX: 27.01 KG/M2 | RESPIRATION RATE: 18 BRPM | DIASTOLIC BLOOD PRESSURE: 61 MMHG | SYSTOLIC BLOOD PRESSURE: 108 MMHG | OXYGEN SATURATION: 100 % | TEMPERATURE: 98.3 F

## 2022-12-09 DIAGNOSIS — K31.84 GASTROPARESIS: ICD-10-CM

## 2022-12-09 DIAGNOSIS — Z95.828 PORT-A-CATH IN PLACE: Primary | ICD-10-CM

## 2022-12-09 PROCEDURE — 96360 HYDRATION IV INFUSION INIT: CPT

## 2022-12-09 PROCEDURE — 25010000002 HEPARIN LOCK FLUSH PER 10 UNITS: Performed by: ADVANCED PRACTICE MIDWIFE

## 2022-12-09 PROCEDURE — 96361 HYDRATE IV INFUSION ADD-ON: CPT

## 2022-12-09 RX ORDER — HEPARIN SODIUM (PORCINE) LOCK FLUSH IV SOLN 100 UNIT/ML 100 UNIT/ML
500 SOLUTION INTRAVENOUS AS NEEDED
Status: CANCELLED | OUTPATIENT
Start: 2022-12-09

## 2022-12-09 RX ORDER — SODIUM CHLORIDE 9 MG/ML
2000 INJECTION, SOLUTION INTRAVENOUS ONCE
Status: COMPLETED | OUTPATIENT
Start: 2022-12-09 | End: 2022-12-09

## 2022-12-09 RX ORDER — HEPARIN SODIUM (PORCINE) LOCK FLUSH IV SOLN 100 UNIT/ML 100 UNIT/ML
500 SOLUTION INTRAVENOUS AS NEEDED
Status: DISCONTINUED | OUTPATIENT
Start: 2022-12-09 | End: 2022-12-09 | Stop reason: HOSPADM

## 2022-12-09 RX ORDER — SODIUM CHLORIDE 0.9 % (FLUSH) 0.9 %
10 SYRINGE (ML) INJECTION AS NEEDED
Status: CANCELLED | OUTPATIENT
Start: 2022-12-09

## 2022-12-09 RX ORDER — SODIUM CHLORIDE 9 MG/ML
2000 INJECTION, SOLUTION INTRAVENOUS ONCE
Status: CANCELLED | OUTPATIENT
Start: 2022-12-10

## 2022-12-09 RX ORDER — SODIUM CHLORIDE 0.9 % (FLUSH) 0.9 %
10 SYRINGE (ML) INJECTION AS NEEDED
Status: DISCONTINUED | OUTPATIENT
Start: 2022-12-09 | End: 2022-12-09 | Stop reason: HOSPADM

## 2022-12-09 RX ADMIN — SODIUM CHLORIDE 2000 ML: 9 INJECTION, SOLUTION INTRAVENOUS at 08:07

## 2022-12-09 RX ADMIN — Medication 10 ML: at 10:15

## 2022-12-09 RX ADMIN — HEPARIN SODIUM (PORCINE) LOCK FLUSH IV SOLN 100 UNIT/ML 500 UNITS: 100 SOLUTION at 10:16

## 2022-12-14 ENCOUNTER — APPOINTMENT (OUTPATIENT)
Dept: ONCOLOGY | Facility: HOSPITAL | Age: 27
End: 2022-12-14
Payer: COMMERCIAL

## 2022-12-21 ENCOUNTER — INFUSION (OUTPATIENT)
Dept: ONCOLOGY | Facility: HOSPITAL | Age: 27
End: 2022-12-21
Payer: COMMERCIAL

## 2022-12-21 VITALS
OXYGEN SATURATION: 100 % | SYSTOLIC BLOOD PRESSURE: 103 MMHG | WEIGHT: 134 LBS | RESPIRATION RATE: 16 BRPM | BODY MASS INDEX: 27.01 KG/M2 | HEIGHT: 59 IN | DIASTOLIC BLOOD PRESSURE: 62 MMHG | TEMPERATURE: 97.1 F | HEART RATE: 100 BPM

## 2022-12-21 DIAGNOSIS — Z95.828 PORT-A-CATH IN PLACE: Primary | ICD-10-CM

## 2022-12-21 DIAGNOSIS — K31.84 GASTROPARESIS: ICD-10-CM

## 2022-12-21 PROCEDURE — 96361 HYDRATE IV INFUSION ADD-ON: CPT

## 2022-12-21 PROCEDURE — 96360 HYDRATION IV INFUSION INIT: CPT

## 2022-12-21 RX ORDER — SODIUM CHLORIDE 9 MG/ML
2000 INJECTION, SOLUTION INTRAVENOUS ONCE
Status: COMPLETED | OUTPATIENT
Start: 2022-12-21 | End: 2022-12-21

## 2022-12-21 RX ORDER — HEPARIN SODIUM (PORCINE) LOCK FLUSH IV SOLN 100 UNIT/ML 100 UNIT/ML
500 SOLUTION INTRAVENOUS AS NEEDED
Status: CANCELLED | OUTPATIENT
Start: 2022-12-21

## 2022-12-21 RX ORDER — SODIUM CHLORIDE 0.9 % (FLUSH) 0.9 %
10 SYRINGE (ML) INJECTION AS NEEDED
Status: DISCONTINUED | OUTPATIENT
Start: 2022-12-21 | End: 2022-12-21 | Stop reason: HOSPADM

## 2022-12-21 RX ORDER — SODIUM CHLORIDE 0.9 % (FLUSH) 0.9 %
10 SYRINGE (ML) INJECTION AS NEEDED
Status: CANCELLED | OUTPATIENT
Start: 2022-12-21

## 2022-12-21 RX ORDER — SODIUM CHLORIDE 9 MG/ML
2000 INJECTION, SOLUTION INTRAVENOUS ONCE
Status: CANCELLED | OUTPATIENT
Start: 2022-12-23

## 2022-12-21 RX ORDER — HEPARIN SODIUM (PORCINE) LOCK FLUSH IV SOLN 100 UNIT/ML 100 UNIT/ML
500 SOLUTION INTRAVENOUS AS NEEDED
Status: DISCONTINUED | OUTPATIENT
Start: 2022-12-21 | End: 2022-12-21 | Stop reason: HOSPADM

## 2022-12-21 RX ADMIN — SODIUM CHLORIDE 2000 ML: 9 INJECTION, SOLUTION INTRAVENOUS at 08:10

## 2023-01-03 ENCOUNTER — INFUSION (OUTPATIENT)
Dept: ONCOLOGY | Facility: HOSPITAL | Age: 28
End: 2023-01-03
Payer: COMMERCIAL

## 2023-01-03 VITALS
DIASTOLIC BLOOD PRESSURE: 72 MMHG | HEART RATE: 103 BPM | BODY MASS INDEX: 27.42 KG/M2 | TEMPERATURE: 97.9 F | SYSTOLIC BLOOD PRESSURE: 113 MMHG | WEIGHT: 136 LBS | RESPIRATION RATE: 18 BRPM | OXYGEN SATURATION: 100 % | HEIGHT: 59 IN

## 2023-01-03 DIAGNOSIS — Z95.828 PORT-A-CATH IN PLACE: Primary | ICD-10-CM

## 2023-01-03 DIAGNOSIS — K31.84 GASTROPARESIS: ICD-10-CM

## 2023-01-03 PROCEDURE — 25010000002 HEPARIN LOCK FLUSH PER 10 UNITS: Performed by: ADVANCED PRACTICE MIDWIFE

## 2023-01-03 PROCEDURE — 96361 HYDRATE IV INFUSION ADD-ON: CPT

## 2023-01-03 PROCEDURE — 96360 HYDRATION IV INFUSION INIT: CPT

## 2023-01-03 RX ORDER — SODIUM CHLORIDE 9 MG/ML
2000 INJECTION, SOLUTION INTRAVENOUS ONCE
Status: COMPLETED | OUTPATIENT
Start: 2023-01-03 | End: 2023-01-03

## 2023-01-03 RX ORDER — HEPARIN SODIUM (PORCINE) LOCK FLUSH IV SOLN 100 UNIT/ML 100 UNIT/ML
500 SOLUTION INTRAVENOUS AS NEEDED
Status: DISCONTINUED | OUTPATIENT
Start: 2023-01-03 | End: 2023-01-03 | Stop reason: HOSPADM

## 2023-01-03 RX ORDER — SODIUM CHLORIDE 9 MG/ML
2000 INJECTION, SOLUTION INTRAVENOUS ONCE
Status: CANCELLED | OUTPATIENT
Start: 2023-01-04

## 2023-01-03 RX ORDER — HEPARIN SODIUM (PORCINE) LOCK FLUSH IV SOLN 100 UNIT/ML 100 UNIT/ML
500 SOLUTION INTRAVENOUS AS NEEDED
Status: CANCELLED | OUTPATIENT
Start: 2023-01-03

## 2023-01-03 RX ORDER — SODIUM CHLORIDE 0.9 % (FLUSH) 0.9 %
10 SYRINGE (ML) INJECTION AS NEEDED
Status: CANCELLED | OUTPATIENT
Start: 2023-01-03

## 2023-01-03 RX ORDER — SODIUM CHLORIDE 0.9 % (FLUSH) 0.9 %
10 SYRINGE (ML) INJECTION AS NEEDED
Status: DISCONTINUED | OUTPATIENT
Start: 2023-01-03 | End: 2023-01-03 | Stop reason: HOSPADM

## 2023-01-03 RX ADMIN — Medication 10 ML: at 10:33

## 2023-01-03 RX ADMIN — HEPARIN SODIUM (PORCINE) LOCK FLUSH IV SOLN 100 UNIT/ML 500 UNITS: 100 SOLUTION at 10:33

## 2023-01-03 RX ADMIN — SODIUM CHLORIDE 2000 ML: 9 INJECTION, SOLUTION INTRAVENOUS at 08:15

## 2023-01-05 ENCOUNTER — INFUSION (OUTPATIENT)
Dept: ONCOLOGY | Facility: HOSPITAL | Age: 28
End: 2023-01-05
Payer: COMMERCIAL

## 2023-01-05 VITALS
OXYGEN SATURATION: 100 % | WEIGHT: 136 LBS | HEIGHT: 59 IN | DIASTOLIC BLOOD PRESSURE: 62 MMHG | TEMPERATURE: 98.6 F | SYSTOLIC BLOOD PRESSURE: 101 MMHG | BODY MASS INDEX: 27.42 KG/M2 | RESPIRATION RATE: 15 BRPM | HEART RATE: 94 BPM

## 2023-01-05 DIAGNOSIS — Z95.828 PORT-A-CATH IN PLACE: Primary | ICD-10-CM

## 2023-01-05 DIAGNOSIS — K31.84 GASTROPARESIS: ICD-10-CM

## 2023-01-05 PROCEDURE — 25010000002 HEPARIN LOCK FLUSH PER 10 UNITS: Performed by: ADVANCED PRACTICE MIDWIFE

## 2023-01-05 PROCEDURE — 96361 HYDRATE IV INFUSION ADD-ON: CPT

## 2023-01-05 PROCEDURE — 96360 HYDRATION IV INFUSION INIT: CPT

## 2023-01-05 RX ORDER — SODIUM CHLORIDE 0.9 % (FLUSH) 0.9 %
10 SYRINGE (ML) INJECTION AS NEEDED
Status: CANCELLED | OUTPATIENT
Start: 2023-01-05

## 2023-01-05 RX ORDER — HEPARIN SODIUM (PORCINE) LOCK FLUSH IV SOLN 100 UNIT/ML 100 UNIT/ML
500 SOLUTION INTRAVENOUS AS NEEDED
Status: CANCELLED | OUTPATIENT
Start: 2023-01-05

## 2023-01-05 RX ORDER — SODIUM CHLORIDE 0.9 % (FLUSH) 0.9 %
10 SYRINGE (ML) INJECTION AS NEEDED
Status: DISCONTINUED | OUTPATIENT
Start: 2023-01-05 | End: 2023-01-05 | Stop reason: HOSPADM

## 2023-01-05 RX ORDER — HEPARIN SODIUM (PORCINE) LOCK FLUSH IV SOLN 100 UNIT/ML 100 UNIT/ML
500 SOLUTION INTRAVENOUS AS NEEDED
Status: DISCONTINUED | OUTPATIENT
Start: 2023-01-05 | End: 2023-01-05 | Stop reason: HOSPADM

## 2023-01-05 RX ORDER — SODIUM CHLORIDE 9 MG/ML
2000 INJECTION, SOLUTION INTRAVENOUS ONCE
Status: COMPLETED | OUTPATIENT
Start: 2023-01-05 | End: 2023-01-05

## 2023-01-05 RX ORDER — SODIUM CHLORIDE 9 MG/ML
2000 INJECTION, SOLUTION INTRAVENOUS ONCE
Status: CANCELLED | OUTPATIENT
Start: 2023-01-10

## 2023-01-05 RX ADMIN — Medication 10 ML: at 10:19

## 2023-01-05 RX ADMIN — HEPARIN SODIUM (PORCINE) LOCK FLUSH IV SOLN 100 UNIT/ML 500 UNITS: 100 SOLUTION at 10:19

## 2023-01-05 RX ADMIN — SODIUM CHLORIDE 2000 ML: 9 INJECTION, SOLUTION INTRAVENOUS at 08:09

## 2023-01-11 ENCOUNTER — APPOINTMENT (OUTPATIENT)
Dept: ONCOLOGY | Facility: HOSPITAL | Age: 28
End: 2023-01-11
Payer: COMMERCIAL

## 2023-02-09 ENCOUNTER — TRANSCRIBE ORDERS (OUTPATIENT)
Dept: ADMINISTRATIVE | Facility: HOSPITAL | Age: 28
End: 2023-02-09
Payer: COMMERCIAL

## 2023-02-09 DIAGNOSIS — R10.31 RIGHT LOWER QUADRANT PAIN: Primary | ICD-10-CM

## 2023-02-10 ENCOUNTER — HOSPITAL ENCOUNTER (EMERGENCY)
Facility: HOSPITAL | Age: 28
Discharge: HOME | End: 2023-02-10
Attending: STUDENT IN AN ORGANIZED HEALTH CARE EDUCATION/TRAINING PROGRAM | Admitting: STUDENT IN AN ORGANIZED HEALTH CARE EDUCATION/TRAINING PROGRAM
Payer: COMMERCIAL

## 2023-02-10 ENCOUNTER — APPOINTMENT (OUTPATIENT)
Dept: ULTRASOUND IMAGING | Facility: HOSPITAL | Age: 28
End: 2023-02-10
Payer: COMMERCIAL

## 2023-02-10 ENCOUNTER — APPOINTMENT (OUTPATIENT)
Dept: CT IMAGING | Facility: HOSPITAL | Age: 28
End: 2023-02-10
Payer: COMMERCIAL

## 2023-02-10 VITALS
HEART RATE: 88 BPM | SYSTOLIC BLOOD PRESSURE: 112 MMHG | HEIGHT: 59 IN | TEMPERATURE: 98.2 F | DIASTOLIC BLOOD PRESSURE: 78 MMHG | RESPIRATION RATE: 20 BRPM | OXYGEN SATURATION: 98 % | BODY MASS INDEX: 26.61 KG/M2 | WEIGHT: 132 LBS

## 2023-02-10 DIAGNOSIS — R10.31 RIGHT LOWER QUADRANT ABDOMINAL PAIN: Primary | ICD-10-CM

## 2023-02-10 LAB
ALBUMIN SERPL-MCNC: 4.5 G/DL (ref 3.5–5.2)
ALBUMIN/GLOB SERPL: 1.7 G/DL
ALP SERPL-CCNC: 87 U/L (ref 39–117)
ALT SERPL W P-5'-P-CCNC: 12 U/L (ref 1–33)
ANION GAP SERPL CALCULATED.3IONS-SCNC: 10 MMOL/L (ref 5–15)
AST SERPL-CCNC: 14 U/L (ref 1–32)
B-HCG UR QL: NEGATIVE
BASOPHILS # BLD AUTO: 0.06 10*3/MM3 (ref 0–0.2)
BASOPHILS NFR BLD AUTO: 0.6 % (ref 0–1.5)
BILIRUB SERPL-MCNC: 0.5 MG/DL (ref 0–1.2)
BILIRUB UR QL STRIP: NEGATIVE
BUN SERPL-MCNC: 7 MG/DL (ref 6–20)
BUN/CREAT SERPL: 11.1 (ref 7–25)
CALCIUM SPEC-SCNC: 8.9 MG/DL (ref 8.6–10.5)
CHLORIDE SERPL-SCNC: 104 MMOL/L (ref 98–107)
CLARITY UR: CLEAR
CO2 SERPL-SCNC: 26 MMOL/L (ref 22–29)
COLOR UR: YELLOW
CREAT SERPL-MCNC: 0.63 MG/DL (ref 0.57–1)
DEPRECATED RDW RBC AUTO: 35.9 FL (ref 37–54)
EGFRCR SERPLBLD CKD-EPI 2021: 126.4 ML/MIN/1.73
EOSINOPHIL # BLD AUTO: 0.02 10*3/MM3 (ref 0–0.4)
EOSINOPHIL NFR BLD AUTO: 0.2 % (ref 0.3–6.2)
ERYTHROCYTE [DISTWIDTH] IN BLOOD BY AUTOMATED COUNT: 11.3 % (ref 12.3–15.4)
EXPIRATION DATE: NORMAL
GLOBULIN UR ELPH-MCNC: 2.6 GM/DL
GLUCOSE SERPL-MCNC: 89 MG/DL (ref 65–99)
GLUCOSE UR STRIP-MCNC: NEGATIVE MG/DL
HCT VFR BLD AUTO: 37.1 % (ref 34–46.6)
HGB BLD-MCNC: 12.7 G/DL (ref 12–15.9)
HGB UR QL STRIP.AUTO: NEGATIVE
IMM GRANULOCYTES # BLD AUTO: 0.04 10*3/MM3 (ref 0–0.05)
IMM GRANULOCYTES NFR BLD AUTO: 0.4 % (ref 0–0.5)
INTERNAL NEGATIVE CONTROL: NEGATIVE
INTERNAL POSITIVE CONTROL: POSITIVE
KETONES UR QL STRIP: NEGATIVE
LEUKOCYTE ESTERASE UR QL STRIP.AUTO: NEGATIVE
LIPASE SERPL-CCNC: 15 U/L (ref 13–60)
LYMPHOCYTES # BLD AUTO: 1.82 10*3/MM3 (ref 0.7–3.1)
LYMPHOCYTES NFR BLD AUTO: 18.7 % (ref 19.6–45.3)
Lab: NORMAL
MAGNESIUM SERPL-MCNC: 1.6 MG/DL (ref 1.6–2.6)
MCH RBC QN AUTO: 30.3 PG (ref 26.6–33)
MCHC RBC AUTO-ENTMCNC: 34.2 G/DL (ref 31.5–35.7)
MCV RBC AUTO: 88.5 FL (ref 79–97)
MONOCYTES # BLD AUTO: 0.67 10*3/MM3 (ref 0.1–0.9)
MONOCYTES NFR BLD AUTO: 6.9 % (ref 5–12)
NEUTROPHILS NFR BLD AUTO: 7.1 10*3/MM3 (ref 1.7–7)
NEUTROPHILS NFR BLD AUTO: 73.2 % (ref 42.7–76)
NITRITE UR QL STRIP: NEGATIVE
NRBC BLD AUTO-RTO: 0 /100 WBC (ref 0–0.2)
PH UR STRIP.AUTO: 8.5 [PH] (ref 5–8)
PLATELET # BLD AUTO: 229 10*3/MM3 (ref 140–450)
PMV BLD AUTO: 10.1 FL (ref 6–12)
POTASSIUM SERPL-SCNC: 3.7 MMOL/L (ref 3.5–5.2)
PROT SERPL-MCNC: 7.1 G/DL (ref 6–8.5)
PROT UR QL STRIP: NEGATIVE
RBC # BLD AUTO: 4.19 10*6/MM3 (ref 3.77–5.28)
SODIUM SERPL-SCNC: 140 MMOL/L (ref 136–145)
SP GR UR STRIP: 1.02 (ref 1–1.03)
UROBILINOGEN UR QL STRIP: ABNORMAL
WBC NRBC COR # BLD: 9.71 10*3/MM3 (ref 3.4–10.8)

## 2023-02-10 PROCEDURE — 81025 URINE PREGNANCY TEST: CPT | Performed by: STUDENT IN AN ORGANIZED HEALTH CARE EDUCATION/TRAINING PROGRAM

## 2023-02-10 PROCEDURE — 99283 EMERGENCY DEPT VISIT LOW MDM: CPT

## 2023-02-10 PROCEDURE — 25010000002 ONDANSETRON PER 1 MG: Performed by: STUDENT IN AN ORGANIZED HEALTH CARE EDUCATION/TRAINING PROGRAM

## 2023-02-10 PROCEDURE — 25010000002 IOPAMIDOL 61 % SOLUTION: Performed by: STUDENT IN AN ORGANIZED HEALTH CARE EDUCATION/TRAINING PROGRAM

## 2023-02-10 PROCEDURE — 93010 ELECTROCARDIOGRAM REPORT: CPT | Performed by: INTERNAL MEDICINE

## 2023-02-10 PROCEDURE — 25010000002 KETOROLAC TROMETHAMINE PER 15 MG: Performed by: STUDENT IN AN ORGANIZED HEALTH CARE EDUCATION/TRAINING PROGRAM

## 2023-02-10 PROCEDURE — 76856 US EXAM PELVIC COMPLETE: CPT

## 2023-02-10 PROCEDURE — 81003 URINALYSIS AUTO W/O SCOPE: CPT | Performed by: STUDENT IN AN ORGANIZED HEALTH CARE EDUCATION/TRAINING PROGRAM

## 2023-02-10 PROCEDURE — 96375 TX/PRO/DX INJ NEW DRUG ADDON: CPT

## 2023-02-10 PROCEDURE — 85025 COMPLETE CBC W/AUTO DIFF WBC: CPT | Performed by: STUDENT IN AN ORGANIZED HEALTH CARE EDUCATION/TRAINING PROGRAM

## 2023-02-10 PROCEDURE — 83735 ASSAY OF MAGNESIUM: CPT | Performed by: STUDENT IN AN ORGANIZED HEALTH CARE EDUCATION/TRAINING PROGRAM

## 2023-02-10 PROCEDURE — 36415 COLL VENOUS BLD VENIPUNCTURE: CPT

## 2023-02-10 PROCEDURE — 93005 ELECTROCARDIOGRAM TRACING: CPT | Performed by: STUDENT IN AN ORGANIZED HEALTH CARE EDUCATION/TRAINING PROGRAM

## 2023-02-10 PROCEDURE — 74177 CT ABD & PELVIS W/CONTRAST: CPT

## 2023-02-10 PROCEDURE — 83690 ASSAY OF LIPASE: CPT | Performed by: STUDENT IN AN ORGANIZED HEALTH CARE EDUCATION/TRAINING PROGRAM

## 2023-02-10 PROCEDURE — 80053 COMPREHEN METABOLIC PANEL: CPT | Performed by: STUDENT IN AN ORGANIZED HEALTH CARE EDUCATION/TRAINING PROGRAM

## 2023-02-10 PROCEDURE — 96374 THER/PROPH/DIAG INJ IV PUSH: CPT

## 2023-02-10 RX ORDER — KETOROLAC TROMETHAMINE 30 MG/ML
30 INJECTION, SOLUTION INTRAMUSCULAR; INTRAVENOUS ONCE
Status: COMPLETED | OUTPATIENT
Start: 2023-02-10 | End: 2023-02-10

## 2023-02-10 RX ORDER — SODIUM CHLORIDE 0.9 % (FLUSH) 0.9 %
10 SYRINGE (ML) INJECTION AS NEEDED
Status: DISCONTINUED | OUTPATIENT
Start: 2023-02-10 | End: 2023-02-10 | Stop reason: HOSPADM

## 2023-02-10 RX ORDER — ONDANSETRON 2 MG/ML
4 INJECTION INTRAMUSCULAR; INTRAVENOUS ONCE
Status: COMPLETED | OUTPATIENT
Start: 2023-02-10 | End: 2023-02-10

## 2023-02-10 RX ADMIN — ONDANSETRON 4 MG: 2 INJECTION INTRAMUSCULAR; INTRAVENOUS at 09:16

## 2023-02-10 RX ADMIN — SODIUM CHLORIDE, POTASSIUM CHLORIDE, SODIUM LACTATE AND CALCIUM CHLORIDE 1000 ML: 600; 310; 30; 20 INJECTION, SOLUTION INTRAVENOUS at 09:17

## 2023-02-10 RX ADMIN — KETOROLAC TROMETHAMINE 30 MG: 30 INJECTION, SOLUTION INTRAMUSCULAR; INTRAVENOUS at 10:26

## 2023-02-10 RX ADMIN — IOPAMIDOL 100 ML: 612 INJECTION, SOLUTION INTRAVENOUS at 09:33

## 2023-02-10 NOTE — ED PROVIDER NOTES
Subjective   History of Present Illness   Patient presents due to abdominal pain.  Present in the right lower quadrant.  Has been present for 2 days.  Started early Wednesday morning.  Saw her doctor and the pain was mild so her doctor ordered an outpatient CT scan.  She describes it as intermittent sharp pain but not stabbing.  Gradually worsening.  Associated nausea, no vomiting.  History of gastroparesis.  No fevers.  Eating and drinking less than usual.  Had diarrhea overnight, urinating less than usual.  No burning when she pees.  No vaginal pain or bleeding.  Had a pregnancy test on Wednesday that was negative.  Last ate a couple bites at 2 AM.    Review of Systems   Constitutional: Negative for chills and fever.   Respiratory: Negative for cough and shortness of breath.    Cardiovascular: Negative for chest pain and palpitations.   Gastrointestinal: Positive for abdominal pain and nausea. Negative for vomiting.   Genitourinary: Negative for difficulty urinating and dysuria.   Neurological: Negative for syncope and light-headedness.       Past Medical History:   Diagnosis Date   • Anemia    • Depression    • Dyspepsia    • Samantha-Danlos syndrome     2018   • Gastroparesis    • GERD (gastroesophageal reflux disease)    • IBS (irritable bowel syndrome)    • PONV (postoperative nausea and vomiting)    • Postural orthostatic tachycardia syndrome    • Scoliosis deformity of spine 2017       Allergies   Allergen Reactions   • Adhesive Tape Rash   • Compazine [Prochlorperazine Edisylate] Rash   • Percocet [Oxycodone-Acetaminophen] Rash   • Scopolamine Rash       Past Surgical History:   Procedure Laterality Date   •  SECTION     • CHOLECYSTECTOMY     • COLONOSCOPY N/A 2020    Procedure: COLONOSCOPY WITH ANESTHESIA;  Surgeon: Ronaldo Cole DO;  Location: [facility] ENDOSCOPY;  Service: Gastroenterology;  Laterality: N/A;  preop; altered bowel  postop; normal   PCP Ju Garibay,    •  ENDOSCOPY  04/08/2016   • ENDOSCOPY N/A 8/2/2017    Procedure: ESOPHAGOGASTRODUODENOSCOPY possible dilation ;  Surgeon: Franky Berman MD;  Location: [facility] ENDOSCOPY;  Service:    • ENDOSCOPY N/A 11/28/2022    Procedure: ESOPHAGOGASTRODUODENOSCOPY WITH ANESTHESIA;  Surgeon: Ronaldo Cole DO;  Location: [facility] ENDOSCOPY;  Service: Gastroenterology;  Laterality: N/A;  pre gerd  post normal  Ju Schdler   • TONSILLECTOMY     • VENOUS ACCESS DEVICE (PORT) INSERTION N/A 8/25/2022    Procedure: INSERTION VENOUS ACCESS DEVICE (PORT-A-CATH);  Surgeon: Jordan Baez MD;  Location: [facility] HYBRID OR 12;  Service: Vascular;  Laterality: N/A;       Family History   Problem Relation Age of Onset   • Colon polyps Mother    • Breast cancer Mother 49   • Hypertension Mother    • Hypertension Father    • Arthritis Father    • Hyperlipidemia Father    • Heart disease Father    • Migraines Father    • No Known Problems Sister    • No Known Problems Sister    • No Known Problems Sister    • No Known Problems Brother    • Diabetes Paternal Grandfather    • Hypertension Paternal Grandfather    • Seizures Nephew    • Colon cancer Neg Hx    • Esophageal cancer Neg Hx    • Stroke Neg Hx        Social History     Socioeconomic History   • Marital status:    Tobacco Use   • Smoking status: Never   • Smokeless tobacco: Never   Vaping Use   • Vaping Use: Never used   Substance and Sexual Activity   • Alcohol use: No   • Drug use: No   • Sexual activity: Yes     Partners: Male     Birth control/protection: Vasectomy           Objective   Physical Exam  Vitals reviewed.   Constitutional:       General: She is not in acute distress.  HENT:      Head: Normocephalic and atraumatic.   Eyes:      Extraocular Movements: Extraocular movements intact.      Conjunctiva/sclera: Conjunctivae normal.   Cardiovascular:      Pulses: Normal pulses.      Heart sounds: Normal heart sounds.   Pulmonary:      Effort: Pulmonary effort is  normal. No respiratory distress.      Breath sounds: No wheezing.   Abdominal:      General: Abdomen is flat. There is no distension.      Tenderness: There is abdominal tenderness (RLQ). There is rebound (RLQ).   Musculoskeletal:         General: No swelling or tenderness.      Cervical back: Normal range of motion and neck supple.      Right lower leg: No edema.      Left lower leg: No edema.   Skin:     General: Skin is warm and dry.   Neurological:      General: No focal deficit present.      Mental Status: She is alert. Mental status is at baseline.   Psychiatric:         Behavior: Behavior normal.         Thought Content: Thought content normal.         Procedures           ED Course  ED Course as of 02/11/23 0826   Fri Feb 10, 2023   0916 -Laboratory studies reviewed by me and are notable for no acute focal abnormality. [AS]   1022 Patient feels improved with fluids and nausea medicine.  She is still getting fluids but her tachycardia has improved to a rate of 109.  She still has the pain.  We discussed her work-up with her normal CT abdomen pelvis.  We discussed that a pelvic etiology is possible for her pain and that CT would not rule this out; discussed obtaining pelvic ultrasound here versus outpatient follow-up as she request pelvic ultrasound here.  Toradol ordered as well given the pain. [AS]      ED Course User Index  [AS] Agustín Montilla MD                                           [facility]  Nai Golden is a 27 y.o. female who presented to the ED with abdominal pain, nausea. Appendicitis vs enteritis. H/o hysterectomy and cholecystectomy. Afebrile; tachycardic, endorses some baseline tachycardia with her POTS but says that it is not usually this fast.    Intussusception unlikely given lack of brief, intense episodes of pain, lack of hematochezia.  Mesenteric ischemia unlikely given abdominal tenderness on exam, no distention, pain is not out of proportion with abdominal  exam.  Volvulus unlikely given VSS, no peritonitic signs, no distention.  Bowel obstruction unlikely given pt reports BM, no recent surgical history.  Cholecystitis or ascending cholangitis unlikely given cholecystectomy and no right upper quadrant tenderness to palpation.  Pancreatitis unlikely as the patient has a normal lipase.  No hernia palpated on exam.  Inflammatory Bowel Disease unlikely given pain is not episodic, no history of autoimmune disease, no recurrent diarrhea or bloody diarrhea on history.  Abdominal aortic aneurysm unlikely as VSS, no hypotension, no palpable abdominal mass.    Pyelonephritis or UTI unlikely given lack of fever, no dysuria, urgency, or other UTI symptoms. Urinalysis without signs of acute UTI.  Ureterolithiasis unlikely given pain is not colicky, pt has abdominal tenderness on exam, no h/o nephrolithiasis.    UPT negative so doubt ectopic pregnancy or IUP.   No fever to suggest tubal ovarian abscess.   Pt is not in extremis, VSS, pain not abrupt in onset with a singular constant episode, pain not isolated to lower abdomen; ovarian torsion unlikely.      ED Course:  --Lab studies reviewed by me and are significant for no acute focal abnormality.  -CT scan without acute process.  She does have ovarian cyst.  Discussed utility of pelvic ultrasound in the context of lower abdominal pain; patient elects to proceed with this.  It did not show any acute emergent findings.  Patient did feel somewhat improved with fluids and nausea medicine.  She has close outpatient primary care follow-up established.    No further workup indicated at this time. Patient is stable and appropriate for discharge. Patient received strict return precautions including worsening abdominal pain, lightheadedness, passing out, inability to tolerate food or water, and was instructed to follow-up with their primary care provider regarding their ER visit.  Final diagnoses:   Right lower quadrant abdominal pain        ED Disposition  ED Disposition     ED Disposition   Discharge    Condition   Stable    Comment   --             Ju Garibay, APRN  [address] [place] [address] [phone]               Medication List      Changed    * cyanocobalamin 1000 MCG/ML injection  Inject 1 mL into the appropriate muscle as directed by prescriber Every 14 (Fourteen) Days.  What changed: additional instructions     * cyanocobalamin 1000 MCG/ML injection  Inject 1 mL into the appropriate muscle as directed by prescriber Every 14 (Fourteen) Days.  What changed: Another medication with the same name was changed. Make sure you understand how and when to take each.         * This list has 2 medication(s) that are the same as other medications prescribed for you. Read the directions carefully, and ask your doctor or other care provider to review them with you.                 Agustín Montilla MD  02/11/23 5799

## 2023-02-15 LAB
QT INTERVAL: 352 MS
QTC INTERVAL: 462 MS

## 2023-03-02 ENCOUNTER — OFFICE VISIT (OUTPATIENT)
Dept: OBSTETRICS AND GYNECOLOGY | Facility: CLINIC | Age: 28
End: 2023-03-02
Payer: COMMERCIAL

## 2023-03-02 VITALS
WEIGHT: 136 LBS | SYSTOLIC BLOOD PRESSURE: 118 MMHG | HEIGHT: 59 IN | DIASTOLIC BLOOD PRESSURE: 74 MMHG | BODY MASS INDEX: 27.42 KG/M2

## 2023-03-02 DIAGNOSIS — N93.8 DYSFUNCTIONAL UTERINE BLEEDING: Primary | ICD-10-CM

## 2023-03-02 DIAGNOSIS — N94.10 FEMALE DYSPAREUNIA: ICD-10-CM

## 2023-03-02 DIAGNOSIS — R10.2 PELVIC PAIN: ICD-10-CM

## 2023-03-02 DIAGNOSIS — R53.83 FATIGUE, UNSPECIFIED TYPE: ICD-10-CM

## 2023-03-02 DIAGNOSIS — E34.9 HORMONE DISTURBANCE: ICD-10-CM

## 2023-03-02 DIAGNOSIS — N39.46 MIXED INCONTINENCE: ICD-10-CM

## 2023-03-02 DIAGNOSIS — N89.8 VAGINAL DISCHARGE: ICD-10-CM

## 2023-03-02 PROCEDURE — 99214 OFFICE O/P EST MOD 30 MIN: CPT | Performed by: ADVANCED PRACTICE MIDWIFE

## 2023-03-02 NOTE — PROGRESS NOTES
"Subjective     Nai Golden is a 27 y.o. female    History of Present Illness  Patient arrived for a gyne appointment with a concern of a possible rectocele. She relates she has been having concerns since the birth of her last child.  She has noticed increased concerns of pressure over the last 6 months.  She even splints in order to assist with having a bowel movement.    She is also having a concern with vaginal odor.  Describes the odor as chronic since approximately 2018 when she had an IUD in place.  She has since had the IUD removed, but still experiences odor.  Has had cultures which have shown bacterial vaginosis.    Her other concerns include pain with intercourse, decreased or difficulty with lubrication, unwanted chest hairs, acne, fatigue, weight gain and then inability to lose weight, and varied menstrual cycles.  Her dyspareunia is more in the pelvic region, as she reports no feeling in her vaginal canal, except for the cervix.  She does have feeling in the vulvar region.  \"My body has not felt right since delivery.\"    She describes her menstrual cycles as varied.  She will have cycles that are regular, episodes where she skips 2 to 3 months between cycles, or frequent periods such as 2 weeks apart.  When she does have bleeding, it is heavy with clots and lasts 5 to 7 days.        /74   Ht 149.9 cm (59\")   Wt 61.7 kg (136 lb)   LMP 02/13/2023 (Approximate)   BMI 27.47 kg/m²     Outpatient Encounter Medications as of 3/2/2023   Medication Sig Dispense Refill   • armodafinil (Nuvigil) 150 MG tablet Take 1 tablet by mouth Daily. 30 tablet 1   • bethanechol (URECHOLINE) 25 MG tablet Take 1 tablet by mouth 3 (Three) Times a Day 1 hour before or 2 hours after meals 90 tablet 2   • cyanocobalamin 1000 MCG/ML injection Inject 1 mL into the appropriate muscle as directed by prescriber Every 14 (Fourteen) Days. (Patient taking differently: Inject 1 mL into the appropriate muscle as " directed by prescriber Every 14 (Fourteen) Days. PT STATES SHE IS NOW TAKING ONCE A MONTH) 2 mL 8   • cyanocobalamin 1000 MCG/ML injection Inject 1 mL into the appropriate muscle as directed by prescriber Every 14 (Fourteen) Days. 2 mL 8   • desvenlafaxine (PRISTIQ) 50 MG 24 hr tablet Take 1 tablet by mouth Daily. 30 tablet 3   • famotidine (PEPCID) 40 MG tablet Take 1 tablet by mouth 2 (Two) Times a Day.     • Galcanezumab-gnlm (Emgality) 120 MG/ML solution prefilled syringe Inject 1 mL under the skin into the appropriate area as directed Every 30 (Thirty) Days. 1 mL 5   • Galcanezumab-gnlm (Emgality) 120 MG/ML solution prefilled syringe Inject 1 mL under the skin into the appropriate area as directed Every 30 (Thirty) Days. 1 mL 5   • Galcanezumab-gnlm (Emgality) 120 MG/ML solution prefilled syringe Inject 1 mL under the skin into the appropriate area as directed Every 30 (Thirty) Days. 1 each 5   • Lidocaine, Anorectal, (LC-5 Lidocaine) 5 % cream cream Apply Daily As Needed for port access. 45 g 1   • lubiprostone (Amitiza) 24 MCG capsule Take 1 capsule by mouth 2 (Two) Times a Day with food and water 60 capsule 0   • meclizine (ANTIVERT) 12.5 MG tablet Take 1 tablet by mouth Every 12 (Twelve) Hours. 60 tablet 2   • meclizine (ANTIVERT) 25 MG tablet Take 1 tablet by mouth Every 12 (Twelve) Hours. 60 tablet 2   • NON FORMULARY 10 mg 4 (Four) Times a Day. Domperidone     • ondansetron (ZOFRAN) 8 MG tablet Take 1 tablet by mouth Every 6 (Six) Hours to 8 (Eight) As Needed. 60 tablet 2   • ubrogepant (ubrogepant) 100 MG tablet Take 1 tablet by mouth once daily. May take 2nd dose at least 2 hours after first dose as needed. **Max 2 tabs (200mg) in 24 hours** 30 tablet 5   • [DISCONTINUED] amitriptyline (ELAVIL) 25 MG tablet Take 1 tablet by mouth Every Night. 30 tablet 2   • [DISCONTINUED] bethanechol (URECHOLINE) 10 MG tablet Take 1 tablet 1 hour before or 2 hours after meals Orally Three times a day 90 tablet 2   •  [DISCONTINUED] modafinil (PROVIGIL) 100 MG tablet Take 1 tablet by mouth Every Morning. 30 tablet 3   • [DISCONTINUED] modafinil (PROVIGIL) 200 MG tablet Take 1 tablet by mouth Every Morning. 30 tablet 3   • [DISCONTINUED] Plecanatide (Trulance) 3 MG tablet Take 1 tablet by mouth Daily. 30 tablet 3     No facility-administered encounter medications on file as of 3/2/2023.       Surgical History  Past Surgical History:   Procedure Laterality Date   •  SECTION     • CHOLECYSTECTOMY     • COLONOSCOPY N/A 2020    Procedure: COLONOSCOPY WITH ANESTHESIA;  Surgeon: Ronaldo Cole DO;  Location: [facility] ENDOSCOPY;  Service: Gastroenterology;  Laterality: N/A;  preop; altered bowel  postop; normal   PCP Ju Garibay,    • ENDOSCOPY  2016   • ENDOSCOPY N/A 2017    Procedure: ESOPHAGOGASTRODUODENOSCOPY possible dilation ;  Surgeon: Franky Berman MD;  Location: [facility] ENDOSCOPY;  Service:    • ENDOSCOPY N/A 2022    Procedure: ESOPHAGOGASTRODUODENOSCOPY WITH ANESTHESIA;  Surgeon: Ronaldo Cole DO;  Location: [facility] ENDOSCOPY;  Service: Gastroenterology;  Laterality: N/A;  pre gerd  post normal  Ju Sandhu   • TONSILLECTOMY     • VENOUS ACCESS DEVICE (PORT) INSERTION N/A 2022    Procedure: INSERTION VENOUS ACCESS DEVICE (PORT-A-CATH);  Surgeon: Jordan Baez MD;  Location: [facility] HYBRID OR 12;  Service: Vascular;  Laterality: N/A;       Family History  Family History   Problem Relation Age of Onset   • Colon polyps Mother    • Breast cancer Mother 49   • Hypertension Mother    • Hypertension Father    • Arthritis Father    • Hyperlipidemia Father    • Heart disease Father    • Migraines Father    • No Known Problems Sister    • No Known Problems Sister    • No Known Problems Sister    • No Known Problems Brother    • Diabetes Paternal Grandfather    • Hypertension Paternal Grandfather    • Seizures Nephew    • Colon cancer Neg Hx    • Esophageal cancer Neg Hx     • Stroke Neg Hx    • Ovarian cancer Neg Hx    • Endometrial cancer Neg Hx    • Uterine cancer Neg Hx    • Melanoma Neg Hx        The following portions of the patient's history were reviewed and updated as appropriate: allergies, current medications, past family history, past medical history, past social history, past surgical history, and problem list.    Review of Systems   Constitutional: Positive for fatigue. Negative for chills and fever.   Respiratory: Negative for chest tightness and shortness of breath.    Cardiovascular: Negative for chest pain.   Gastrointestinal: Positive for constipation, nausea and vomiting. Negative for abdominal pain and diarrhea.   Genitourinary: Positive for dyspareunia, menstrual problem, pelvic pain and urinary incontinence (mixed). Negative for amenorrhea, decreased libido, difficulty urinating, dysuria, flank pain, frequency, pelvic pressure, vaginal bleeding, vaginal discharge and vaginal pain.   Musculoskeletal: Negative for gait problem.   Skin: Negative for pallor and rash.        Acne and unwanted hairs to chest area   Neurological: Positive for headache. Negative for dizziness and light-headedness.   Hematological: Negative for adenopathy.   Psychiatric/Behavioral: Negative for dysphoric mood, self-injury, suicidal ideas and depressed mood. The patient is not nervous/anxious.        Objective   Physical Exam  Vitals and nursing note reviewed. Exam conducted with a chaperone present.   Constitutional:       General: She is not in acute distress.     Appearance: Normal appearance. She is well-developed, well-groomed and overweight. She is not ill-appearing or diaphoretic.   HENT:      Head: Normocephalic and atraumatic.      Right Ear: External ear normal.      Left Ear: External ear normal.   Eyes:      General: Lids are normal. No scleral icterus.        Right eye: No discharge.         Left eye: No discharge.      Extraocular Movements: Extraocular movements intact.       Conjunctiva/sclera: Conjunctivae normal.   Neck:      Trachea: Phonation normal.   Cardiovascular:      Rate and Rhythm: Normal rate and regular rhythm.      Heart sounds: Normal heart sounds. No murmur heard.  Pulmonary:      Effort: Pulmonary effort is normal. No accessory muscle usage or respiratory distress.      Breath sounds: Normal breath sounds and air entry. No wheezing.   Chest:      Chest wall: No tenderness.   Abdominal:      General: There is no distension.      Palpations: Abdomen is soft.      Tenderness: There is no abdominal tenderness. There is no right CVA tenderness, left CVA tenderness, guarding or rebound.      Hernia: There is no hernia in the left inguinal area or right inguinal area.   Genitourinary:     General: Normal vulva.      Exam position: Lithotomy position.      Pubic Area: No rash or pubic lice.       Labia:         Right: No rash, tenderness, lesion or injury.         Left: No rash, tenderness, lesion or injury.       Urethra: No urethral pain, urethral swelling or urethral lesion.      Vagina: No signs of injury and foreign body. Vaginal discharge present. No erythema, tenderness, bleeding, lesions or prolapsed vaginal walls.      Cervix: No cervical motion tenderness, friability, lesion, erythema or cervical bleeding.      Uterus: Normal.       Adnexa:         Right: Tenderness present. No mass or fullness.          Left: Tenderness present. No mass or fullness.        Rectum: Normal.      Comments: BSU and perineum normal. No bladder prolapse or rectocele noted.   Musculoskeletal:         General: No tenderness. Normal range of motion.      Cervical back: Normal range of motion and neck supple. No rigidity or tenderness. No pain with movement. Normal range of motion.      Right lower leg: No edema.      Left lower leg: No edema.   Lymphadenopathy:      Lower Body: No right inguinal adenopathy. No left inguinal adenopathy.   Skin:     General: Skin is warm and dry.       Coloration: Skin is not ashen, cyanotic, jaundiced, mottled, pale or sallow.      Findings: No bruising, erythema, lesion or rash.   Neurological:      Mental Status: She is alert and oriented to person, place, and time.      Gait: Gait normal.   Psychiatric:         Attention and Perception: Attention and perception normal.         Mood and Affect: Mood normal.         Speech: Speech normal.         Behavior: Behavior normal. Behavior is cooperative.         Thought Content: Thought content normal.         Cognition and Memory: Cognition and memory normal.         Judgment: Judgment normal.         Chart reviewed for screenings  Last Pap Smear: 09/28/2022 NILM (ECTZ present)   Last Mammogram: Not yet indicated. Family history of breast cancer in mother, diagnosed at age 49   Last Colonoscopy: 09/30/2020 normal. No family history of colon cancer noted.  Last Bone Density Scan: Not yet indicated.          Assessment & Plan   Diagnoses and all orders for this visit:    1. Dysfunctional uterine bleeding (Primary)  - Discussed with patient her dysfunctional uterine bleeding/irregular menses and heavy bleeding, possible associated causes (endocrine conditions including thyroid disorders and hyperandrogenism, vitamin deficiency), and measures of support, which may include medications such as Metformin, supplements, or hormone therapy.  Labs to be ordered today and drawn when patient is fasting.  Plan of care to be developed with regards to patient's symptoms, lab findings, assessment findings, and with shared decision making with the patient. Thyroid disorder, hyperandrogenism, insulin resistance, PCOS, vitamin D deficiency among considered differential diagnoses.  -     TSH  -     T3, Uptake  -     Vitamin D,25-Hydroxy  -     T4, Free  -     Comprehensive Metabolic Panel  -     Estradiol  -     Follicle Stimulating Hormone  -     Insulin, Total  -     Luteinizing Hormone  -     Progesterone  -     Testosterone  -      DHEA-Sulfate  -     Cortisol  -     Hemoglobin A1c  -     Lipid Panel With LDL / HDL Ratio  -     Prolactin  -     17-Hydroxyprogesterone  - Return to the office with an ultrasound for dysfunctional uterine bleeding and pelvic pain.  Patient to also return to the office as needed with any concerns.    2. Vaginal discharge  - Gyne exam today.  No active odor noted.  Did note thin, yellow-cream-colored discharge within vaginal vault.  Discussed vaginal health and measures of support, including regular use of a women's health probiotic.  Cultures obtained and patient will be notified of the results.  -     NuSwab VG+ - Swab, Vagina  -     Genital Mycoplasmas JORDANA, Swab - Swab, Vagina    3. Mixed incontinence  - Discussed urge incontinence, associated causes, and measures of support. Discussed dietary changes, including limitation of caffeine, artificial sweeteners, citrus products, and alcohol. Also discussed bladder retraining and medication (anticholinergic medication) for support.  - Discussed measures of support for MORGAN, including home exercises (Kegels) and consultation with physical therapy for evaluation and treatment of the pelvic floor with regards to both stress urinary incontinence and patient concern of dyspareunia.  Patient does desire consultation with physical therapy at this time. Kegel Exercises video and education included in the AVS.  -     Ambulatory Referral to Physical Therapy Pelvic Floor    4. Female dyspareunia  - Discussed measures of support, such as extended foreplay, use of lubricants, position changes, and consultation with physical therapy for evaluation and treatment of the pelvic floor.  Patient does desire consultation.  Dyspareunia Female education included in the AVS.  -     Ambulatory Referral to Physical Therapy Pelvic Floor    5. Fatigue, unspecified type  -     TSH  -     T3, Uptake  -     Vitamin D,25-Hydroxy  -     T4, Free    6. Pelvic pain  - Patient with concerns of pelvic  pain and tenderness noted in the adnexal areas.  No cervical motion tenderness.  No CVA tenderness.  Discussed measures of support, including NSAIDs and heat. Pelvic Pain Female education included in the AVS.  - Return to the office with an ultrasound for pelvic pain and as needed with any concerns.    7. Hormone disturbance  - Patient with symptoms of hormone, endocrine, or even vitamin deficiency symptoms, including acne, weight changes, irregular menstrual cycle, vaginal dryness, fatigue, coarse black unwanted chest hairs.  Labs discussed and ordered today.  Patient will have labs drawn when she is fasting.  Will notify patient of results and plan of care to be developed with regards to symptoms and results with patient through shared decision making.  -     Estradiol  -     Follicle Stimulating Hormone  -     Insulin, Total  -     Luteinizing Hormone  -     Progesterone  -     Testosterone  -     DHEA-Sulfate  -     Cortisol  -     Hemoglobin A1c  -     Lipid Panel With LDL / HDL Ratio  -     Prolactin  -     17-Hydroxyprogesterone    8. BMI 27.0-27.9,adult  - BMI today 27.47 kg/m², which is considered overweight.  Patient relates that with her Samantha-Danlos, even her weight is such causes discomfort, but she reports difficulty with achieving a better weight and body appearance. Discussed weight management, a healthy weight, and healthy lifestyle measures, including healthy dietary selections, portion control, and daily exercise. Diet for Polycystic Ovary Syndrome education included in the AVS.    BMI is >= 25 and <30. (Overweight) The following options were offered after discussion;: exercise counseling/recommendations and nutrition counseling/recommendations        This note has been signed electronically.    Erin Ruano, DNP, APRN, CNM, RNC-OB  3/2/2023

## 2023-03-07 LAB
A VAGINAE DNA VAG QL NAA+PROBE: NORMAL SCORE
BVAB2 DNA VAG QL NAA+PROBE: NORMAL SCORE
C ALBICANS DNA VAG QL NAA+PROBE: NEGATIVE
C GLABRATA DNA VAG QL NAA+PROBE: NEGATIVE
C TRACH DNA VAG QL NAA+PROBE: NEGATIVE
M GENITALIUM DNA SPEC QL NAA+PROBE: NEGATIVE
M HOMINIS DNA SPEC QL NAA+PROBE: NEGATIVE
MEGA1 DNA VAG QL NAA+PROBE: NORMAL SCORE
N GONORRHOEA DNA VAG QL NAA+PROBE: NEGATIVE
T VAGINALIS DNA VAG QL NAA+PROBE: NEGATIVE
UREAPLASMA DNA SPEC QL NAA+PROBE: POSITIVE

## 2023-03-08 DIAGNOSIS — N89.8 VAGINAL DISCHARGE: Primary | ICD-10-CM

## 2023-03-08 RX ORDER — DOXYCYCLINE HYCLATE 100 MG/1
100 CAPSULE ORAL 2 TIMES DAILY
Qty: 14 CAPSULE | Refills: 0 | Status: SHIPPED | OUTPATIENT
Start: 2023-03-08

## 2023-03-17 ENCOUNTER — LAB (OUTPATIENT)
Dept: LAB | Facility: HOSPITAL | Age: 28
End: 2023-03-17
Payer: COMMERCIAL

## 2023-03-17 LAB
25(OH)D3 SERPL-MCNC: 26.9 NG/ML (ref 30–100)
ALBUMIN SERPL-MCNC: 4.5 G/DL (ref 3.5–5.2)
ALBUMIN/GLOB SERPL: 1.7 G/DL
ALP SERPL-CCNC: 99 U/L (ref 39–117)
ALT SERPL W P-5'-P-CCNC: 14 U/L (ref 1–33)
ANION GAP SERPL CALCULATED.3IONS-SCNC: 13 MMOL/L (ref 5–15)
AST SERPL-CCNC: 22 U/L (ref 1–32)
BILIRUB SERPL-MCNC: 0.4 MG/DL (ref 0–1.2)
BUN SERPL-MCNC: 8 MG/DL (ref 6–20)
BUN/CREAT SERPL: 12.5 (ref 7–25)
CALCIUM SPEC-SCNC: 9.1 MG/DL (ref 8.6–10.5)
CHLORIDE SERPL-SCNC: 101 MMOL/L (ref 98–107)
CHOLEST SERPL-MCNC: 136 MG/DL (ref 0–200)
CO2 SERPL-SCNC: 25 MMOL/L (ref 22–29)
CORTIS SERPL-MCNC: 9.54 MCG/DL
CREAT SERPL-MCNC: 0.64 MG/DL (ref 0.57–1)
EGFRCR SERPLBLD CKD-EPI 2021: 126 ML/MIN/1.73
ESTRADIOL SERPL HS-MCNC: 85.4 PG/ML
FSH SERPL-ACNC: 2.5 MIU/ML
GLOBULIN UR ELPH-MCNC: 2.7 GM/DL
GLUCOSE SERPL-MCNC: 71 MG/DL (ref 65–99)
HBA1C MFR BLD: 4.5 % (ref 4.8–5.6)
HDLC SERPL-MCNC: 46 MG/DL (ref 40–60)
LDLC SERPL CALC-MCNC: 73 MG/DL (ref 0–100)
LDLC/HDLC SERPL: 1.58 {RATIO}
LH SERPL-ACNC: 3.23 MIU/ML
POTASSIUM SERPL-SCNC: 3.8 MMOL/L (ref 3.5–5.2)
PROGEST SERPL-MCNC: 2.84 NG/ML
PROLACTIN SERPL-MCNC: 1170 NG/ML (ref 4.79–23.3)
PROT SERPL-MCNC: 7.2 G/DL (ref 6–8.5)
SODIUM SERPL-SCNC: 139 MMOL/L (ref 136–145)
T4 FREE SERPL-MCNC: 1.07 NG/DL (ref 0.93–1.7)
TESTOST SERPL-MCNC: 39.7 NG/DL (ref 8.4–48.1)
TRIGL SERPL-MCNC: 86 MG/DL (ref 0–150)
TSH SERPL DL<=0.05 MIU/L-ACNC: 1.57 UIU/ML (ref 0.27–4.2)
VLDLC SERPL-MCNC: 17 MG/DL (ref 5–40)

## 2023-03-17 PROCEDURE — 82306 VITAMIN D 25 HYDROXY: CPT | Performed by: ADVANCED PRACTICE MIDWIFE

## 2023-03-17 PROCEDURE — 80053 COMPREHEN METABOLIC PANEL: CPT | Performed by: ADVANCED PRACTICE MIDWIFE

## 2023-03-17 PROCEDURE — 80061 LIPID PANEL: CPT | Performed by: ADVANCED PRACTICE MIDWIFE

## 2023-03-17 PROCEDURE — 36415 COLL VENOUS BLD VENIPUNCTURE: CPT | Performed by: ADVANCED PRACTICE MIDWIFE

## 2023-03-17 PROCEDURE — 83001 ASSAY OF GONADOTROPIN (FSH): CPT | Performed by: ADVANCED PRACTICE MIDWIFE

## 2023-03-17 PROCEDURE — 84479 ASSAY OF THYROID (T3 OR T4): CPT | Performed by: ADVANCED PRACTICE MIDWIFE

## 2023-03-17 PROCEDURE — 83036 HEMOGLOBIN GLYCOSYLATED A1C: CPT | Performed by: ADVANCED PRACTICE MIDWIFE

## 2023-03-17 PROCEDURE — 82533 TOTAL CORTISOL: CPT | Performed by: ADVANCED PRACTICE MIDWIFE

## 2023-03-17 PROCEDURE — 84403 ASSAY OF TOTAL TESTOSTERONE: CPT | Performed by: ADVANCED PRACTICE MIDWIFE

## 2023-03-17 PROCEDURE — 83498 ASY HYDROXYPROGESTERONE 17-D: CPT | Performed by: ADVANCED PRACTICE MIDWIFE

## 2023-03-17 PROCEDURE — 83525 ASSAY OF INSULIN: CPT | Performed by: ADVANCED PRACTICE MIDWIFE

## 2023-03-17 PROCEDURE — 84146 ASSAY OF PROLACTIN: CPT | Performed by: ADVANCED PRACTICE MIDWIFE

## 2023-03-17 PROCEDURE — 84144 ASSAY OF PROGESTERONE: CPT | Performed by: ADVANCED PRACTICE MIDWIFE

## 2023-03-17 PROCEDURE — 84443 ASSAY THYROID STIM HORMONE: CPT | Performed by: ADVANCED PRACTICE MIDWIFE

## 2023-03-17 PROCEDURE — 84439 ASSAY OF FREE THYROXINE: CPT | Performed by: ADVANCED PRACTICE MIDWIFE

## 2023-03-17 PROCEDURE — 82670 ASSAY OF TOTAL ESTRADIOL: CPT | Performed by: ADVANCED PRACTICE MIDWIFE

## 2023-03-17 PROCEDURE — 83002 ASSAY OF GONADOTROPIN (LH): CPT | Performed by: ADVANCED PRACTICE MIDWIFE

## 2023-03-17 PROCEDURE — 82627 DEHYDROEPIANDROSTERONE: CPT | Performed by: ADVANCED PRACTICE MIDWIFE

## 2023-03-18 DIAGNOSIS — E55.9 VITAMIN D DEFICIENCY: ICD-10-CM

## 2023-03-18 DIAGNOSIS — E22.1 HYPERPROLACTINEMIA: Primary | ICD-10-CM

## 2023-03-18 LAB
DHEA-S SERPL-MCNC: 254 UG/DL (ref 84.8–378)
INSULIN SERPL-ACNC: 10.7 UIU/ML (ref 2.6–24.9)
T3RU NFR SERPL: 25 % (ref 24–39)

## 2023-03-18 RX ORDER — ERGOCALCIFEROL 1.25 MG/1
50000 CAPSULE ORAL WEEKLY
Qty: 5 CAPSULE | Refills: 3 | Status: SHIPPED | OUTPATIENT
Start: 2023-03-18

## 2023-03-21 ENCOUNTER — INFUSION (OUTPATIENT)
Dept: ONCOLOGY | Facility: HOSPITAL | Age: 28
End: 2023-03-21
Payer: COMMERCIAL

## 2023-03-21 ENCOUNTER — OFFICE VISIT (OUTPATIENT)
Dept: OBSTETRICS AND GYNECOLOGY | Facility: CLINIC | Age: 28
End: 2023-03-21
Payer: COMMERCIAL

## 2023-03-21 VITALS
RESPIRATION RATE: 18 BRPM | DIASTOLIC BLOOD PRESSURE: 68 MMHG | HEIGHT: 59 IN | BODY MASS INDEX: 28.02 KG/M2 | OXYGEN SATURATION: 98 % | SYSTOLIC BLOOD PRESSURE: 117 MMHG | WEIGHT: 139 LBS | TEMPERATURE: 97.6 F | HEART RATE: 98 BPM

## 2023-03-21 VITALS
DIASTOLIC BLOOD PRESSURE: 70 MMHG | WEIGHT: 139 LBS | BODY MASS INDEX: 28.02 KG/M2 | HEIGHT: 59 IN | SYSTOLIC BLOOD PRESSURE: 110 MMHG

## 2023-03-21 DIAGNOSIS — N92.6 IRREGULAR MENSES: ICD-10-CM

## 2023-03-21 DIAGNOSIS — Z95.828 PORT-A-CATH IN PLACE: Primary | ICD-10-CM

## 2023-03-21 DIAGNOSIS — E22.1 HYPERPROLACTINEMIA: Primary | ICD-10-CM

## 2023-03-21 DIAGNOSIS — R10.2 PELVIC PAIN IN FEMALE: ICD-10-CM

## 2023-03-21 PROCEDURE — 25010000002 HEPARIN LOCK FLUSH PER 10 UNITS: Performed by: ADVANCED PRACTICE MIDWIFE

## 2023-03-21 PROCEDURE — 99213 OFFICE O/P EST LOW 20 MIN: CPT | Performed by: ADVANCED PRACTICE MIDWIFE

## 2023-03-21 PROCEDURE — 96523 IRRIG DRUG DELIVERY DEVICE: CPT

## 2023-03-21 RX ORDER — SODIUM CHLORIDE 0.9 % (FLUSH) 0.9 %
10 SYRINGE (ML) INJECTION AS NEEDED
Status: DISCONTINUED | OUTPATIENT
Start: 2023-03-21 | End: 2023-03-21 | Stop reason: HOSPADM

## 2023-03-21 RX ORDER — SODIUM CHLORIDE 0.9 % (FLUSH) 0.9 %
10 SYRINGE (ML) INJECTION AS NEEDED
OUTPATIENT
Start: 2023-03-21

## 2023-03-21 RX ORDER — HEPARIN SODIUM (PORCINE) LOCK FLUSH IV SOLN 100 UNIT/ML 100 UNIT/ML
500 SOLUTION INTRAVENOUS AS NEEDED
Status: DISCONTINUED | OUTPATIENT
Start: 2023-03-21 | End: 2023-03-21 | Stop reason: HOSPADM

## 2023-03-21 RX ORDER — HEPARIN SODIUM (PORCINE) LOCK FLUSH IV SOLN 100 UNIT/ML 100 UNIT/ML
500 SOLUTION INTRAVENOUS AS NEEDED
OUTPATIENT
Start: 2023-03-21

## 2023-03-21 RX ADMIN — HEPARIN SODIUM (PORCINE) LOCK FLUSH IV SOLN 100 UNIT/ML 500 UNITS: 100 SOLUTION at 09:31

## 2023-03-21 RX ADMIN — Medication 10 ML: at 09:31

## 2023-03-21 NOTE — PROGRESS NOTES
"Subjective     Nai Golden is a 27 y.o. female    History of Present Illness  Patient arrived for a gyne appointment with an ultrasound for follow-up of pelvic pain and irregular menses. She is also here to discuss her labs drawn since her last visit.    She still feels as though she is at her heaviest and is unable to lose weight. She feels this contributes to some of her other symptoms, including the irregular menses. She reports varied cycles: as frequent as two weeks apart to 2-3 months between cycles. She describes her menses as heavy with clots, which can last 5-7 days in duration. She still experiences pelvic pain with intercourse, fatigue, acne, unwanted hairs and concerns with vaginal dryness.          /70   Ht 149.9 cm (59\")   Wt 63 kg (139 lb)   LMP 03/20/2023 (Exact Date)   BMI 28.07 kg/m²     Outpatient Encounter Medications as of 3/21/2023   Medication Sig Dispense Refill   • armodafinil (Nuvigil) 150 MG tablet Take 1 tablet by mouth Daily. 30 tablet 1   • bethanechol (URECHOLINE) 25 MG tablet Take 1 tablet by mouth 3 (Three) Times a Day 1 hour before or 2 hours after meals 90 tablet 2   • cyanocobalamin 1000 MCG/ML injection Inject 1 mL into the appropriate muscle as directed by prescriber Every 14 (Fourteen) Days. (Patient taking differently: Inject 1 mL into the appropriate muscle as directed by prescriber Every 14 (Fourteen) Days. PT STATES SHE IS NOW TAKING ONCE A MONTH) 2 mL 8   • cyanocobalamin 1000 MCG/ML injection Inject 1 mL into the appropriate muscle as directed by prescriber Every 14 (Fourteen) Days. 2 mL 8   • desvenlafaxine (PRISTIQ) 50 MG 24 hr tablet Take 1 tablet by mouth Daily. 30 tablet 3   • doxycycline (VIBRAMYCIN) 100 MG capsule Take 1 capsule by mouth 2 (Two) Times a Day. 14 capsule 0   • famotidine (PEPCID) 40 MG tablet Take 1 tablet by mouth 2 (Two) Times a Day.     • Galcanezumab-gnlm (Emgality) 120 MG/ML solution prefilled syringe Inject 1 mL under the " skin into the appropriate area as directed Every 30 (Thirty) Days. 1 mL 5   • Galcanezumab-gnlm (Emgality) 120 MG/ML solution prefilled syringe Inject 1 mL under the skin into the appropriate area as directed Every 30 (Thirty) Days. 1 mL 5   • Galcanezumab-gnlm (Emgality) 120 MG/ML solution prefilled syringe Inject 1 mL under the skin into the appropriate area as directed Every 30 (Thirty) Days. 1 each 5   • Lidocaine, Anorectal, (LC-5 Lidocaine) 5 % cream cream Apply Daily As Needed for port access. 45 g 1   • lubiprostone (Amitiza) 24 MCG capsule Take 1 capsule by mouth 2 (Two) Times a Day with food and water 60 capsule 0   • meclizine (ANTIVERT) 12.5 MG tablet Take 1 tablet by mouth Every 12 (Twelve) Hours. 60 tablet 2   • meclizine (ANTIVERT) 25 MG tablet Take 1 tablet by mouth Every 12 (Twelve) Hours. 60 tablet 2   • NON FORMULARY 10 mg 4 (Four) Times a Day. Domperidone     • ondansetron (ZOFRAN) 8 MG tablet Take 1 tablet by mouth Every 6 (Six) Hours to 8 (Eight) As Needed. 60 tablet 2   • ubrogepant (ubrogepant) 100 MG tablet Take 1 tablet by mouth once daily. May take 2nd dose at least 2 hours after first dose as needed. **Max 2 tabs (200mg) in 24 hours** 30 tablet 5   • vitamin D (ERGOCALCIFEROL) 1.25 MG (64102 UT) capsule capsule Take 1 capsule by mouth 1 (One) Time Per Week. 5 capsule 3     No facility-administered encounter medications on file as of 3/21/2023.       Surgical History  Past Surgical History:   Procedure Laterality Date   •  SECTION     • CHOLECYSTECTOMY     • COLONOSCOPY N/A 2020    Procedure: COLONOSCOPY WITH ANESTHESIA;  Surgeon: Ronaldo Cole DO;  Location: [facility] ENDOSCOPY;  Service: Gastroenterology;  Laterality: N/A;  preop; altered bowel  postop; normal   PCP Ju Garibay,    • ENDOSCOPY  2016   • ENDOSCOPY N/A 2017    Procedure: ESOPHAGOGASTRODUODENOSCOPY possible dilation ;  Surgeon: Franky Berman MD;  Location: [facility] ENDOSCOPY;  Service:    •  ENDOSCOPY N/A 11/28/2022    Procedure: ESOPHAGOGASTRODUODENOSCOPY WITH ANESTHESIA;  Surgeon: Ronaldo Cole DO;  Location: [facility] ENDOSCOPY;  Service: Gastroenterology;  Laterality: N/A;  pre gerd  post normal  Ju Schdler   • TONSILLECTOMY     • VENOUS ACCESS DEVICE (PORT) INSERTION N/A 8/25/2022    Procedure: INSERTION VENOUS ACCESS DEVICE (PORT-A-CATH);  Surgeon: Jordan Baez MD;  Location: [facility] HYBRID OR 12;  Service: Vascular;  Laterality: N/A;       Family History  Family History   Problem Relation Age of Onset   • Colon polyps Mother    • Breast cancer Mother 49   • Hypertension Mother    • Hypertension Father    • Arthritis Father    • Hyperlipidemia Father    • Heart disease Father    • Migraines Father    • No Known Problems Sister    • No Known Problems Sister    • No Known Problems Sister    • No Known Problems Brother    • Diabetes Paternal Grandfather    • Hypertension Paternal Grandfather    • Seizures Nephew    • Colon cancer Neg Hx    • Esophageal cancer Neg Hx    • Stroke Neg Hx    • Ovarian cancer Neg Hx    • Endometrial cancer Neg Hx    • Uterine cancer Neg Hx    • Melanoma Neg Hx        The following portions of the patient's history were reviewed and updated as appropriate: allergies, current medications, past family history, past medical history, past social history, past surgical history, and problem list.    Review of Systems   Constitutional: Positive for fatigue. Negative for chills and fever.   Respiratory: Negative for cough, chest tightness and shortness of breath.    Cardiovascular: Negative for chest pain and leg swelling.   Gastrointestinal: Positive for constipation. Negative for abdominal pain, diarrhea, nausea and vomiting.   Genitourinary: Positive for dyspareunia, menstrual problem, pelvic pain, urinary incontinence (mixed) and vaginal bleeding. Negative for breast discharge, breast lump, breast pain, decreased libido, difficulty urinating, dysuria, flank  pain, frequency, genital sores, hematuria, vaginal discharge and vaginal pain.   Musculoskeletal: Negative for gait problem.   Skin: Negative for pallor and rash.   Allergic/Immunologic: Negative for environmental allergies, food allergies and immunocompromised state.   Neurological: Negative for dizziness, light-headedness and headache.   Psychiatric/Behavioral: Negative for dysphoric mood, self-injury, suicidal ideas and depressed mood. The patient is not nervous/anxious.        Objective   Physical Exam  Vitals and nursing note reviewed.   Constitutional:       General: She is not in acute distress.     Appearance: Normal appearance. She is well-developed, well-groomed and overweight. She is not ill-appearing or diaphoretic.   HENT:      Head: Normocephalic and atraumatic.      Right Ear: External ear normal.      Left Ear: External ear normal.   Eyes:      General: Lids are normal. No scleral icterus.        Right eye: No discharge.         Left eye: No discharge.      Extraocular Movements: Extraocular movements intact.      Conjunctiva/sclera: Conjunctivae normal.   Neck:      Trachea: Phonation normal.   Cardiovascular:      Rate and Rhythm: Normal rate and regular rhythm.      Heart sounds: Normal heart sounds. No murmur heard.  Pulmonary:      Effort: Pulmonary effort is normal. No accessory muscle usage or respiratory distress.      Breath sounds: Normal breath sounds and air entry. No wheezing.   Chest:      Chest wall: No tenderness.   Abdominal:      General: There is no distension.      Palpations: Abdomen is soft.      Tenderness: There is no abdominal tenderness. There is no right CVA tenderness, left CVA tenderness, guarding or rebound.   Musculoskeletal:         General: No tenderness. Normal range of motion.      Cervical back: Normal range of motion and neck supple. No rigidity or tenderness. No pain with movement. Normal range of motion.      Right lower leg: No edema.      Left lower leg: No  edema.   Skin:     General: Skin is warm and dry.      Coloration: Skin is not ashen, cyanotic, jaundiced, mottled, pale or sallow.      Findings: No bruising, erythema, lesion or rash.   Neurological:      Mental Status: She is alert and oriented to person, place, and time.      Gait: Gait normal.   Psychiatric:         Attention and Perception: Attention and perception normal.         Mood and Affect: Mood and affect normal.         Speech: Speech normal.         Behavior: Behavior normal. Behavior is cooperative.         Thought Content: Thought content normal.         Cognition and Memory: Cognition and memory normal.         Judgment: Judgment normal.         TVUS today: Anteverted uterus measures 6.12 cm in length. Endometrial lining measures 7.4 mm. Multiple small follicles visualized bilaterally. There is no free fluid.         Chart reviewed for screenings  Last Pap Smear: 09/28/2022 NILM (ECTZ present)   Last Mammogram: Not yet indicated. Family history of breast cancer in mother, diagnosed at age 49   Last Colonoscopy: 09/30/2020 normal. No family history of colon cancer noted.  Last Bone Density Scan: Not yet indicated.      Assessment & Plan   Diagnoses and all orders for this visit:    1. Hyperprolactinemia (Primary)  - Reviewed with patient her prolactin level of 03/17/2023 of 1,170 ng/mL. Patient has had a previously elevated prolacting of 207. Discussed with patient MRI to rule out pituitary adenoma or other concerns. She declines testing at this time. Relates she previously had a brain MRI, which was normal. Reports this is a side effect of an increase in her Domperindone. Patient to notify provider if symptoms would increase, or if she would further consider and choose to have MRI screening completed due to increased prolactin levels or symptoms.  -     MRI Pituitary With & Without Contrast; Future    2. Irregular menses  - Discussed measures of support, including NSAIDs every 8 hours for the  first 5 days of her menstrual cycle, tranexamic acid, or hormone therapy.  Patient declines hormone therapy consideration at present due to side effects, including noted odor and/or discharge with intrauterine device previously.  Discussed signs to report.  Patient to consider options and notify provider if she would like to proceed with a support other than NSAIDs.  Return to the office in 3 months for surveillance of menses.    3. Pelvic pain in female  - TVUS today and reviewed.  Discussed measures of support, including NSAIDs, heat, position changes, consultation with physical therapy for evaluation and treatment of pelvic floor.  Patient is due to present for evaluation for pelvic floor physical therapy.  Will follow-up with patient in 3 months and as needed with concerns.    4. BMI 28.0-28.9,adult  - Noted BMI today of 28.07 kg/m², which is classified as overweight.  This is an increase from previous BMI this month of 27.47 kg/m².  Patient remains uncomfortable and also difficulty with achieving and improved weight, BMI, and body appearance.  Encouraged healthy lifestyle measures, including portion control, and dietary selections.  Also encouraged regular exercise.  Discussed medications for measures of support, including metformin and Wegovy.  Patient desires to avoid any medication which may lead to slowed gastric motility with her history of gastric paresis.  We will follow-up with weight management in 3 months and as needed.    BMI is >= 25 and <30. (Overweight) The following options were offered after discussion;: exercise counseling/recommendations, nutrition counseling/recommendations and pharmacological intervention options.        This note has been signed electronically.    Erin Ruano, AMMY, APRN, CNM, RNC-OB  03/21/2023

## 2023-03-24 ENCOUNTER — TELEPHONE (OUTPATIENT)
Dept: OBSTETRICS AND GYNECOLOGY | Facility: CLINIC | Age: 28
End: 2023-03-24
Payer: COMMERCIAL

## 2023-03-24 DIAGNOSIS — E22.1 HYPERPROLACTINEMIA: Primary | ICD-10-CM

## 2023-03-24 NOTE — TELEPHONE ENCOUNTER
[facility] scheduling is needing a new MRI order placed for this patient and the other canceled. They are stating a MRI Brain w wo must be ordered with pituitary per protocol. Could you please cancel the old original order & add a new one? Once done I will route back for scheduling.

## 2023-03-25 LAB — 17OHP SERPL-MCNC: 72 NG/DL

## 2023-03-30 NOTE — TELEPHONE ENCOUNTER
That's what I mean, I'm sorry. It was the scheduling department with the hospital needing a new MRI order not [facility]. On a different note, when they called for scheduling they stated that the patient spoke with our office and that this is to not be scheduled anymore. Is this correct?

## 2023-04-21 ENCOUNTER — LAB (OUTPATIENT)
Dept: LAB | Facility: HOSPITAL | Age: 28
End: 2023-04-21
Payer: COMMERCIAL

## 2023-04-21 ENCOUNTER — OFFICE VISIT (OUTPATIENT)
Dept: OBSTETRICS AND GYNECOLOGY | Facility: CLINIC | Age: 28
End: 2023-04-21
Payer: COMMERCIAL

## 2023-04-21 VITALS
SYSTOLIC BLOOD PRESSURE: 110 MMHG | WEIGHT: 136 LBS | BODY MASS INDEX: 27.42 KG/M2 | DIASTOLIC BLOOD PRESSURE: 80 MMHG | HEIGHT: 59 IN

## 2023-04-21 DIAGNOSIS — F32.A ANXIETY AND DEPRESSION: ICD-10-CM

## 2023-04-21 DIAGNOSIS — F41.9 ANXIETY AND DEPRESSION: ICD-10-CM

## 2023-04-21 DIAGNOSIS — E22.1 HYPERPROLACTINEMIA: ICD-10-CM

## 2023-04-21 DIAGNOSIS — R53.83 OTHER FATIGUE: ICD-10-CM

## 2023-04-21 DIAGNOSIS — G43.821 MENSTRUAL MIGRAINE WITH STATUS MIGRAINOSUS, NOT INTRACTABLE: ICD-10-CM

## 2023-04-21 DIAGNOSIS — E53.8 VITAMIN B 12 DEFICIENCY: ICD-10-CM

## 2023-04-21 DIAGNOSIS — R10.2 PELVIC PAIN IN FEMALE: Primary | ICD-10-CM

## 2023-04-21 LAB
ALBUMIN SERPL-MCNC: 4.8 G/DL (ref 3.5–5.2)
ALBUMIN/GLOB SERPL: 1.8 G/DL
ALP SERPL-CCNC: 109 U/L (ref 39–117)
ALT SERPL W P-5'-P-CCNC: 40 U/L (ref 1–33)
ANION GAP SERPL CALCULATED.3IONS-SCNC: 12 MMOL/L (ref 5–15)
AST SERPL-CCNC: 41 U/L (ref 1–32)
BASOPHILS # BLD AUTO: 0.03 10*3/MM3 (ref 0–0.2)
BASOPHILS NFR BLD AUTO: 0.6 % (ref 0–1.5)
BILIRUB SERPL-MCNC: 0.4 MG/DL (ref 0–1.2)
BUN SERPL-MCNC: 10 MG/DL (ref 6–20)
BUN/CREAT SERPL: 15.2 (ref 7–25)
CALCIUM SPEC-SCNC: 9 MG/DL (ref 8.6–10.5)
CHLORIDE SERPL-SCNC: 101 MMOL/L (ref 98–107)
CO2 SERPL-SCNC: 27 MMOL/L (ref 22–29)
CREAT SERPL-MCNC: 0.66 MG/DL (ref 0.57–1)
DEPRECATED RDW RBC AUTO: 36.8 FL (ref 37–54)
EGFRCR SERPLBLD CKD-EPI 2021: 125 ML/MIN/1.73
EOSINOPHIL # BLD AUTO: 0.01 10*3/MM3 (ref 0–0.4)
EOSINOPHIL NFR BLD AUTO: 0.2 % (ref 0.3–6.2)
ERYTHROCYTE [DISTWIDTH] IN BLOOD BY AUTOMATED COUNT: 11.4 % (ref 12.3–15.4)
GLOBULIN UR ELPH-MCNC: 2.6 GM/DL
GLUCOSE SERPL-MCNC: 92 MG/DL (ref 65–99)
HCT VFR BLD AUTO: 42 % (ref 34–46.6)
HGB BLD-MCNC: 14.1 G/DL (ref 12–15.9)
IMM GRANULOCYTES # BLD AUTO: 0.03 10*3/MM3 (ref 0–0.05)
IMM GRANULOCYTES NFR BLD AUTO: 0.6 % (ref 0–0.5)
LYMPHOCYTES # BLD AUTO: 1.12 10*3/MM3 (ref 0.7–3.1)
LYMPHOCYTES NFR BLD AUTO: 24.2 % (ref 19.6–45.3)
MCH RBC QN AUTO: 30.1 PG (ref 26.6–33)
MCHC RBC AUTO-ENTMCNC: 33.6 G/DL (ref 31.5–35.7)
MCV RBC AUTO: 89.7 FL (ref 79–97)
MONOCYTES # BLD AUTO: 0.92 10*3/MM3 (ref 0.1–0.9)
MONOCYTES NFR BLD AUTO: 19.9 % (ref 5–12)
NEUTROPHILS NFR BLD AUTO: 2.51 10*3/MM3 (ref 1.7–7)
NEUTROPHILS NFR BLD AUTO: 54.5 % (ref 42.7–76)
NRBC BLD AUTO-RTO: 0 /100 WBC (ref 0–0.2)
PLATELET # BLD AUTO: 201 10*3/MM3 (ref 140–450)
PMV BLD AUTO: 9.8 FL (ref 6–12)
POTASSIUM SERPL-SCNC: 3.7 MMOL/L (ref 3.5–5.2)
PROLACTIN SERPL-MCNC: 42 NG/ML (ref 4.79–23.3)
PROT SERPL-MCNC: 7.4 G/DL (ref 6–8.5)
RBC # BLD AUTO: 4.68 10*6/MM3 (ref 3.77–5.28)
SODIUM SERPL-SCNC: 140 MMOL/L (ref 136–145)
VIT B12 BLD-MCNC: 411 PG/ML (ref 211–946)
WBC NRBC COR # BLD: 4.62 10*3/MM3 (ref 3.4–10.8)

## 2023-04-21 PROCEDURE — 85025 COMPLETE CBC W/AUTO DIFF WBC: CPT | Performed by: ADVANCED PRACTICE MIDWIFE

## 2023-04-21 PROCEDURE — 84146 ASSAY OF PROLACTIN: CPT | Performed by: ADVANCED PRACTICE MIDWIFE

## 2023-04-21 PROCEDURE — 80053 COMPREHEN METABOLIC PANEL: CPT | Performed by: ADVANCED PRACTICE MIDWIFE

## 2023-04-21 PROCEDURE — 36415 COLL VENOUS BLD VENIPUNCTURE: CPT | Performed by: ADVANCED PRACTICE MIDWIFE

## 2023-04-21 PROCEDURE — 82607 VITAMIN B-12: CPT | Performed by: ADVANCED PRACTICE MIDWIFE

## 2023-04-21 RX ORDER — RIMEGEPANT SULFATE 75 MG/75MG
1 TABLET, ORALLY DISINTEGRATING ORAL AS NEEDED
Qty: 2 TABLET | Refills: 0 | COMMUNITY
Start: 2023-04-21

## 2023-04-21 NOTE — PROGRESS NOTES
"Subjective     Nai Golden is a 27 y.o. female    History of Present Illness  Patient arrived for a gyne appointment for surveillance of her pelvic pain and elevated prolactin level. She reports no changes significant changes in her concerns. Her cycles have presently been irregular, or she is experiencing intermenstrual bleeding. She had bleeding beginning 03/20, 04/06, and 04/19. Her pelvic pain usually begins just prior to menses and is across the lower pelvic area versus being unilateral. After menses begins and ends, the pain does improve. She is having a menstrual related migraine, which she relates she has had for the last 2-3 days. She has not found anything that has helped. She is also experiencing fatigue and bloating with her cycles and at other times.        /80   Ht 149.9 cm (59.02\")   Wt 61.7 kg (136 lb)   LMP 04/06/2023 (Exact Date)   BMI 27.45 kg/m²     Outpatient Encounter Medications as of 4/21/2023   Medication Sig Dispense Refill   • armodafinil (Nuvigil) 150 MG tablet Take 1 tablet by mouth Daily. 30 tablet 1   • bethanechol (URECHOLINE) 25 MG tablet Take 1 tablet by mouth 3 (Three) Times a Day 1 hour before or 2 hours after meals 90 tablet 2   • cyanocobalamin 1000 MCG/ML injection Inject 1 mL into the appropriate muscle as directed by prescriber Every 14 (Fourteen) Days. (Patient taking differently: Inject 1 mL into the appropriate muscle as directed by prescriber Every 14 (Fourteen) Days. PT STATES SHE IS NOW TAKING ONCE A MONTH) 2 mL 8   • cyanocobalamin 1000 MCG/ML injection Inject 1 mL into the appropriate muscle as directed by prescriber Every 14 (Fourteen) Days. 2 mL 8   • desvenlafaxine (PRISTIQ) 50 MG 24 hr tablet Take 1 tablet by mouth Daily. 30 tablet 3   • doxycycline (VIBRAMYCIN) 100 MG capsule Take 1 capsule by mouth 2 (Two) Times a Day. 14 capsule 0   • famotidine (PEPCID) 40 MG tablet Take 1 tablet by mouth 2 (Two) Times a Day.     • Galcanezumab-gnlm " (Emgality) 120 MG/ML solution prefilled syringe Inject 1 mL under the skin into the appropriate area as directed Every 30 (Thirty) Days. 1 mL 5   • Galcanezumab-gnlm (Emgality) 120 MG/ML solution prefilled syringe Inject 1 mL under the skin into the appropriate area as directed Every 30 (Thirty) Days. 1 mL 5   • Galcanezumab-gnlm (Emgality) 120 MG/ML solution prefilled syringe Inject 1 mL under the skin into the appropriate area as directed Every 30 (Thirty) Days. 1 each 5   • Lidocaine, Anorectal, (LC-5 Lidocaine) 5 % cream cream Apply Daily As Needed for port access. 45 g 1   • lubiprostone (Amitiza) 24 MCG capsule Take 1 capsule by mouth 2 (Two) Times a Day with food and water 60 capsule 0   • meclizine (ANTIVERT) 12.5 MG tablet Take 1 tablet by mouth Every 12 (Twelve) Hours. 60 tablet 2   • meclizine (ANTIVERT) 25 MG tablet Take 1 tablet by mouth Every 12 (Twelve) Hours. 60 tablet 2   • NON FORMULARY 10 mg 4 (Four) Times a Day. Domperidone     • ondansetron (ZOFRAN) 8 MG tablet Take 1 tablet by mouth Every 6 (Six) Hours to 8 (Eight) As Needed. 60 tablet 2   • ubrogepant (Ubrelvy) 100 MG tablet Take 1 tablet by mouth once daily. May take 2nd dose at least 2 hours after first dose as needed. **Max 2 tabs (200mg) in 24 hours** 30 tablet 5   • vitamin D (ERGOCALCIFEROL) 1.25 MG (98923 UT) capsule capsule Take 1 capsule by mouth 1 (One) Time Per Week. 5 capsule 3   • Rimegepant Sulfate (Nurtec) 75 MG tablet dispersible tablet Take 1 tablet by mouth As Needed (migraine). 2 tablet 0     No facility-administered encounter medications on file as of 2023.       Surgical History  Past Surgical History:   Procedure Laterality Date   •  SECTION     • CHOLECYSTECTOMY     • COLONOSCOPY N/A 2020    Procedure: COLONOSCOPY WITH ANESTHESIA;  Surgeon: Ronaldo Cole DO;  Location: [facility] ENDOSCOPY;  Service: Gastroenterology;  Laterality: N/A;  preop; altered bowel  postop; normal   PCP Lani  Ju,    • ENDOSCOPY  04/08/2016   • ENDOSCOPY N/A 08/02/2017    Procedure: ESOPHAGOGASTRODUODENOSCOPY possible dilation ;  Surgeon: Franky Berman MD;  Location: [facility] ENDOSCOPY;  Service:    • ENDOSCOPY N/A 11/28/2022    Procedure: ESOPHAGOGASTRODUODENOSCOPY WITH ANESTHESIA;  Surgeon: Ronaldo Cole DO;  Location: [facility] ENDOSCOPY;  Service: Gastroenterology;  Laterality: N/A;  pre gerd  post normal  Jualecia Oseibeena   • TONSILLECTOMY     • VENOUS ACCESS DEVICE (PORT) INSERTION N/A 08/25/2022    Procedure: INSERTION VENOUS ACCESS DEVICE (PORT-A-CATH);  Surgeon: Jordan Baez MD;  Location: [facility] HYBRID OR 12;  Service: Vascular;  Laterality: N/A;       Family History  Family History   Problem Relation Age of Onset   • Colon polyps Mother    • Breast cancer Mother 49   • Hypertension Mother    • Hypertension Father    • Arthritis Father    • Hyperlipidemia Father    • Heart disease Father    • Migraines Father    • No Known Problems Sister    • No Known Problems Sister    • No Known Problems Sister    • No Known Problems Brother    • Diabetes Paternal Grandfather    • Hypertension Paternal Grandfather    • Seizures Nephew    • Colon cancer Neg Hx    • Esophageal cancer Neg Hx    • Stroke Neg Hx    • Ovarian cancer Neg Hx    • Endometrial cancer Neg Hx    • Uterine cancer Neg Hx    • Melanoma Neg Hx        The following portions of the patient's history were reviewed and updated as appropriate: allergies, current medications, past family history, past medical history, past social history, past surgical history, and problem list.    Review of Systems   Constitutional: Positive for fatigue. Negative for chills and fever.   Respiratory: Negative for chest tightness and shortness of breath.    Cardiovascular: Positive for palpitations. Negative for chest pain and leg swelling.   Gastrointestinal: Positive for constipation. Negative for abdominal pain, diarrhea, nausea and vomiting.   Genitourinary:  Positive for dyspareunia, menstrual problem, pelvic pain, urinary incontinence (mixed) and vaginal bleeding. Negative for breast discharge, breast lump, breast pain, decreased libido, difficulty urinating, dysuria, frequency, urgency and vaginal discharge.   Musculoskeletal: Negative for gait problem.   Skin: Negative for pallor and rash.   Allergic/Immunologic: Negative for immunocompromised state.   Neurological: Positive for headache. Negative for dizziness and light-headedness.   Hematological: Negative for adenopathy.   Psychiatric/Behavioral: Positive for depressed mood. Negative for dysphoric mood, self-injury and suicidal ideas. The patient is nervous/anxious.        Objective   Physical Exam  Vitals and nursing note reviewed.   Constitutional:       General: She is not in acute distress.     Appearance: Normal appearance. She is well-developed, well-groomed and overweight. She is not ill-appearing or diaphoretic.   HENT:      Head: Normocephalic and atraumatic.      Right Ear: External ear normal.      Left Ear: External ear normal.   Eyes:      General: Lids are normal. No scleral icterus.        Right eye: No discharge.         Left eye: No discharge.      Extraocular Movements: Extraocular movements intact.      Conjunctiva/sclera: Conjunctivae normal.   Neck:      Trachea: Phonation normal.   Cardiovascular:      Rate and Rhythm: Normal rate and regular rhythm.      Heart sounds: Normal heart sounds. No murmur heard.  Pulmonary:      Effort: Pulmonary effort is normal. No accessory muscle usage or respiratory distress.      Breath sounds: Normal breath sounds and air entry. No wheezing.   Chest:      Chest wall: No tenderness.   Abdominal:      General: There is no distension.      Palpations: Abdomen is soft.      Tenderness: There is no abdominal tenderness. There is no right CVA tenderness, left CVA tenderness, guarding or rebound.   Musculoskeletal:         General: No tenderness. Normal range of  motion.      Cervical back: Normal range of motion and neck supple. No rigidity or tenderness. No pain with movement. Normal range of motion.      Right lower leg: No edema.      Left lower leg: No edema.   Skin:     General: Skin is warm and dry.      Coloration: Skin is not ashen, cyanotic, jaundiced, mottled, pale or sallow.      Findings: No bruising, erythema, lesion or rash.   Neurological:      Mental Status: She is alert and oriented to person, place, and time.      Gait: Gait normal.   Psychiatric:         Attention and Perception: Attention and perception normal.         Mood and Affect: Mood and affect normal.         Speech: Speech normal.         Behavior: Behavior normal. Behavior is cooperative.         Thought Content: Thought content normal.         Cognition and Memory: Cognition and memory normal.         Judgment: Judgment normal.         TVUS 03/21/2023: Anteverted uterus measures 6.12 cm in length. Endometrial lining measures 7.4 mm. Multiple small follicles visualized bilaterally. There is no free fluid.         Chart reviewed for screenings  Last Pap Smear: 09/28/2022 NILM (ECTZ present)   Last Mammogram: Not yet indicated. Family history of breast cancer in mother, diagnosed at age 49   Last Colonoscopy: 09/30/2020 normal. No family history of colon cancer noted.  Last Bone Density Scan: Not yet indicated.          Assessment & Plan   Diagnoses and all orders for this visit:    1. Pelvic pain in female (Primary)  - Patient with dyspareunia, but then she also has pelvic pain in relation to menses. Discussed possible consultation with one of the gynecologists for further assessment for pelvic pain. Differential diagnosis of endometriosis cannot be excluded, and patient questioned today if this could be another reason for her pelvic pain. Discussed associated sources for pelvic pain may include reasons that are GI, , musculoskeletal, and unknown. Explained endometriosis is diagnosed through  surgical assessment and cannot be visualized on CT scan or ultrasound. Patient verbalized understanding. Discussed measures of support for pelvic pain, which may include hormone therapy, position changes, OTC medications, extended foreplay and/or position changes during intercourse, use of vaginal moisturizers or lubricants. Patient wishes to avoid hormone therapy. She would be willing to consult with gynecologist for further evaluation and treatment.   - She has seen Dr. Dinero previously and has been satisfied with her care. Patient to return to the office for consultation with Dr. Dinero for further evaluation and treatment of pelvic pain. Patient to return to the office also as needed with any concerns.     2. Menstrual migraine with status migrainosus, not intractable  - Discussed management and support measures for headaches, including Tylenol, caffeine, analgesic rub, aromatherapy, massage, chiropractor, hot or cold compresses, OMT consultation, physical therapy consultation, and prescription rescue or abortive medications. Discussed Nurtec and samples provided. Patient also desires referral for consultation for OMT for support for headaches. Referral sent to Dr. Magallon. Rimegepant Oral Dissolving Tablet education included in the AVS. Discussed signs to report or come to the hospital.   -     Rimegepant Sulfate (Nurtec) 75 MG tablet dispersible tablet; Take 1 tablet by mouth As Needed (migraine).  Dispense: 2 tablet; Refill: 0  -     Ambulatory Referral to Family Practice  - Patient to notify provider of effectiveness of Nurtec. She is also to notify provider if signs and symptoms persist or worsen.     3. Hyperprolactinemia  - No nipple discharge at present. She is experiencing increased fatigue. Will repeat prolactin level today and notify patient of results. Patient still declines MRI for further assessment related to elevated prolactin levels.   -     Prolactin    4. Anxiety and depression  - PHQ-2 today,  with a score of 0. Patient's mental health medications are managed by another provider, but she does desire to talk about her concerns with medications. She has been on Zoloft and feels the Zoloft has aided with her depression but has not helped with her anxiety at all. She feels the Pristiq has side effects of increased nausea and vomiting. Discussed measures of support, including healthy support system, massage, aromatherapy, music, counseling, and medications. Also discussed with patient option of Genesight testing and results could also be sent to patient's provider for continued management. Patient does desire testing, which will be completed today.     5. Other fatigue  -     Vitamin B12  -     Comprehensive Metabolic Panel  -     CBC & Differential    6. Vitamin B 12 deficiency  -     Vitamin B12        This note has been signed electronically.    Erin Ruano DNP, APRN, CNM, RNC-OB  04/21/2023

## 2023-04-25 ENCOUNTER — OFFICE VISIT (OUTPATIENT)
Dept: OBSTETRICS AND GYNECOLOGY | Facility: CLINIC | Age: 28
End: 2023-04-25
Payer: COMMERCIAL

## 2023-04-25 VITALS
HEIGHT: 59 IN | BODY MASS INDEX: 27.5 KG/M2 | WEIGHT: 136.4 LBS | DIASTOLIC BLOOD PRESSURE: 78 MMHG | SYSTOLIC BLOOD PRESSURE: 114 MMHG

## 2023-04-25 DIAGNOSIS — N92.6 IRREGULAR MENSES: ICD-10-CM

## 2023-04-25 DIAGNOSIS — R10.2 PELVIC PAIN IN FEMALE: Primary | ICD-10-CM

## 2023-04-25 RX ORDER — SODIUM CHLORIDE 9 MG/ML
100 INJECTION, SOLUTION INTRAVENOUS CONTINUOUS
OUTPATIENT
Start: 2023-04-25

## 2023-04-25 RX ORDER — SODIUM CHLORIDE 0.9 % (FLUSH) 0.9 %
1-10 SYRINGE (ML) INJECTION AS NEEDED
OUTPATIENT
Start: 2023-04-25

## 2023-04-25 RX ORDER — GALCANEZUMAB 120 MG/ML
INJECTION, SOLUTION SUBCUTANEOUS
COMMUNITY

## 2023-04-25 RX ORDER — SODIUM CHLORIDE 9 MG/ML
40 INJECTION, SOLUTION INTRAVENOUS AS NEEDED
OUTPATIENT
Start: 2023-04-25

## 2023-04-25 RX ORDER — LUBIPROSTONE 24 UG/1
CAPSULE ORAL EVERY 12 HOURS SCHEDULED
COMMUNITY

## 2023-04-25 RX ORDER — LEVOCETIRIZINE DIHYDROCHLORIDE 5 MG/1
TABLET, FILM COATED ORAL EVERY 24 HOURS
COMMUNITY

## 2023-04-25 RX ORDER — FAMOTIDINE 40 MG/1
TABLET, FILM COATED ORAL EVERY 24 HOURS
COMMUNITY

## 2023-04-25 RX ORDER — SODIUM CHLORIDE 0.9 % (FLUSH) 0.9 %
10 SYRINGE (ML) INJECTION EVERY 12 HOURS SCHEDULED
OUTPATIENT
Start: 2023-04-25

## 2023-04-25 RX ORDER — ONDANSETRON HYDROCHLORIDE 8 MG/1
TABLET, FILM COATED ORAL EVERY 24 HOURS
COMMUNITY

## 2023-04-25 NOTE — PROGRESS NOTES
"    Chun Dinero MD  [facility]  [address]  [place] [address]  Office [phone]  Fax [phone]      [facility]  Nai Golden  : 1995  MRN: [id number]    Subjective   Subjective     Chief Complaint   Patient presents with   • Pelvic Pain     Patient here for consult on pelvic pain. Per TERRA Fenton last note, patient reports \"Pain that begins just prior to menses, bilaterally and then menses begins and the pain improves.\" Patient had ultrasound 23. Patient states last 6 weeks she has had period every 2 weeks.        History of Present Illness  Nai Golden is a 27 y.o. female , , who comes to the office today for pelvic pain consultation.  Patient for the past couple of months has been noticing increasing pelvic pain.  Pain is worse just prior to menses.  Has this bilaterally.  Menses will begin and her pain does subside.  Of note, she does state that during her menses, she does have significant dysmenorrhea and is having to use a heating pad for this.  She states she has wanted to miss work because of her pain but has been unable to secondary to financials.  Notes pain is bilateral deep in the pelvis.  This is barely alleviated with over-the-counter medications.  Prior to this, she has had no episodes of the above.  She does have IBS but states this is a different sensation/different kind of pain.  She does not endorse dyspareunia as well.  Pain occasionally associated with bowel movements and voids.  Currently bleeding at this time.Patient's last menstrual period was 2023 (exact date).  Irregular cycles for the past 6 weeks.  Has a cycle every 2 to 3 weeks.  Partner has a vasectomy.  She reports negative pregnancy testing.      Review of Systems   Genitourinary: Positive for dyspareunia, menstrual problem, pelvic pain and vaginal bleeding. Negative for decreased urine volume, difficulty urinating, dysuria, enuresis, flank pain, " frequency, genital sores, hematuria, urgency, vaginal discharge and vaginal pain.   All other systems reviewed and are negative.       OB hx:  OB History    Para Term  AB Living   3 3 3 0 0 3   SAB IAB Ectopic Molar Multiple Live Births   0 0 0 0   3      # Outcome Date GA Lbr Deangelo/2nd Weight Sex Delivery Anes PTL Lv   3 Term 20 37w5d 13:18 / 00:03 2820 g (6 lb 3.5 oz) M  None N AZUL   2 Term 18 39w0d  2910 g (6 lb 6.7 oz) F  EPI N AZUL   1 Term 09/16/15 39w5d  3317 g (7 lb 5 oz) M CS-LTranv EPI Y AZUL      Complications: Failure to progress in labor        GYN hx:  Date of LMP: Patient's last menstrual period was 2023 (exact date).  Age at menarche: 13    Menopause: n/a  Menses: irregular  Flow: 2-8 days  Date/Result of last Pap smear: was done on approximately  and the result was: normal PAP.   History of abnormal PAP: No  Date/Result of last mammogram: she has never had a mammogram  History of Abnormal Mammogram: No  Date/Result of last colonoscopy: had colonoscopy in   History of Abnormal colonoscopy: No  Date/Result of last DEXA: None  History of Abnormal DEXA: No  HPV Vaccination: Yes. Details: 3 doses  History of Cervical Dysplasia: No  History of Vulvar Dysplasia: No  History of Sexually Transmitted Infection: no  Current Birth Control Method: vasectomy  History of Contraception: Yes. Details:   -OCPs, mood swings  -nuvaring, mood swings  -Depo-provera  -IUD, BV with IUD (aldo - expulsed 1 year in)  HRT: No  Sexually active: Yes. Details:    FMH of Breast, Uterine, Ovarian, or Colon cancer: Yes. Details: -mother breast cancer and colon polyps  Additional OB/GYN History (not otherwise listed):  -n/a    Surgical History:  -c/s x1  -laparoscopic cholecystectomy    Personal History     The following portions of the patient's history were reviewed and updated as appropriate: allergies, current medications, past family history, past medical history, past social  history, past surgical history and problem list.    History Review Reviewed Comments   Past Medical History:  [x]     Past Surgical History: [x]     Family History: [x]     Social History: [x]       Current Outpatient Medications   Medication Instructions   • armodafinil (NUVIGIL) 150 mg, Oral, Daily   • betamethasone valerate (VALISONE) 0.1 % ointment 1 application, Every 24 Hours   • bethanechol (URECHOLINE) 25 MG tablet Take 1 tablet by mouth 3 (Three) Times a Day 1 hour before or 2 hours after meals   • cyanocobalamin 1,000 mcg, Intramuscular, Every 14 Days   • cyanocobalamin 1,000 mcg, Intramuscular, Every 14 Days   • desvenlafaxine (PRISTIQ) 50 mg, Oral, Daily   • doxycycline (VIBRAMYCIN) 100 mg, Oral, 2 Times Daily   • Emgality 120 mg, Subcutaneous, Every 30 Days   • Emgality 120 mg, Subcutaneous, Every 30 Days   • Emgality 120 mg, Subcutaneous, Every 30 Days   • famotidine (PEPCID) 40 MG tablet Every 24 Hours   • famotidine (PEPCID) 40 mg, Oral, 2 Times Daily   • galcanezumab-gnlm (Emgality) 120 MG/ML auto-injector pen as directed Subcutaneous   • levocetirizine (XYZAL) 5 MG tablet Every 24 Hours   • Lidocaine, Anorectal, (LC-5 Lidocaine) 5 % cream cream Apply Daily As Needed for port access.   • lubiprostone (Amitiza) 24 MCG capsule Take 1 capsule by mouth 2 (Two) Times a Day with food and water   • lubiprostone (AMITIZA) 24 MCG capsule Every 12 Hours Scheduled   • meclizine (ANTIVERT) 12.5 mg, Oral, Every 12 Hours   • meclizine (ANTIVERT) 25 mg, Oral, Every 12 Hours   • NON FORMULARY 10 mg, 4 Times Daily, Domperidone   • Nurtec 75 mg, Oral, As Needed   • ondansetron (ZOFRAN) 8 MG tablet Take 1 tablet by mouth Every 6 (Six) Hours to 8 (Eight) As Needed.   • ondansetron (ZOFRAN) 8 MG tablet Every 24 Hours   • ubrogepant (ubrogepant) 100 MG tablet Take 1 tablet by mouth once daily. May take 2nd dose at least 2 hours after first dose as needed. **Max 2 tabs (200mg) in 24 hours**   • vitamin D (ERGOCALCIFEROL)  "50,000 Units, Oral, Weekly       Allergies   Allergen Reactions   • Adhesive Tape Rash   • Compazine [Prochlorperazine Edisylate] Rash   • Percocet [Oxycodone-Acetaminophen] Rash   • Scopolamine Rash       Objective    Objective     Vitals:   Visit Vitals  /78   Ht 149.9 cm (59.02\")   Wt 61.9 kg (136 lb 6.4 oz)   LMP 04/19/2023 (Exact Date)   BMI 27.53 kg/m²        Physical Exam  Vitals reviewed.   Constitutional:       General: She is not in acute distress.     Appearance: Normal appearance. She is not ill-appearing.   HENT:      Head: Normocephalic and atraumatic.      Nose: No congestion or rhinorrhea.   Eyes:      General: No scleral icterus.        Right eye: No discharge.         Left eye: No discharge.      Extraocular Movements: Extraocular movements intact.      Conjunctiva/sclera: Conjunctivae normal.   Pulmonary:      Effort: Pulmonary effort is normal. No accessory muscle usage or respiratory distress.   Musculoskeletal:      Right lower leg: No edema.      Left lower leg: No edema.   Skin:     General: Skin is warm and dry.      Coloration: Skin is not ashen, cyanotic or jaundiced.   Neurological:      General: No focal deficit present.      Mental Status: She is alert and oriented to person, place, and time.   Psychiatric:         Mood and Affect: Mood normal.         Behavior: Behavior is cooperative.       Result Review    US Non-ob Transvaginal (03/21/2023 08:12)  1.  Uterus: Normal size and Anteverted  2.  Endometrium: 7.4 mm   3.  Myometrium: Normal homogenous texture   4.  Ovaries  Left:    Normal/unremarkable   Right:  Normal/unremarkable   Multiple small follicles noted bilaterally          Assessment & Plan   Assessment / Plan     Diagnoses and all orders for this visit:    1. Pelvic pain in female (Primary)  -     Case Request; Standing  -     Pregnancy, Urine - Urine, Clean Catch; Future  -     Type & Screen; Future  -     sodium chloride 0.9 % infusion  -     ceFAZolin (ANCEF) 2 g in " sodium chloride 0.9 % 100 mL IVPB  -     sodium chloride 0.9 % flush 10 mL  -     sodium chloride 0.9 % flush 1-10 mL  -     sodium chloride 0.9 % infusion 40 mL  -     CBC (No Diff); Future  -     Basic Metabolic Panel; Future  -     Case Request    2. Irregular menses    Other orders  -     Follow Anesthesia Guidelines / Protocol; Future  -     Obtain Informed Consent; Future  -     Provide NPO Instructions to Patient; Future  -     Chlorhexidine Skin Prep; Future  -     Follow Anesthesia Guidelines / Protocol; Standing  -     SCD (Sequential Compression Device) - Place on Patient in Pre-Op; Standing  -     Clip Operative Site; Standing  -     Verify / Perform Chlorhexidine Skin Prep; Standing  -     Verify / Perform Chlorhexidine Skin Prep if Indicated (If Not Already Completed); Standing  -     Pregnancy, Urine - Urine, Clean Catch; Standing  -     Type & Screen; Standing  -     Insert Peripheral IV; Standing  -     Saline Lock & Maintain IV Access; Standing          Discussion:   Patient with pelvic pain as well as regular menses.  She has used contraceptives in the past to help control her cycles as well as for contraception.  Her history and clinical picture is suggestive of endometriosis and/or adenomyosis.  Her ultrasound last time was otherwise unremarkable.  She does report history of ovarian cyst.  Given the above, I recommended surgical evaluation in the form of a diagnostic laparoscopy.  Reviewed surgery with the patient in detail along with recovery.  Discussed that we may not find a reason for her discomfort with surgery.  Discussed that given her symptoms, we would need to consider controlling her menses/ovulation with contraceptives to help reduce her pain and discomfort.  She expressed understanding of this.  She is agreeable to proceeding with surgery.  Plan for diagnostic laparoscopy with possible fulguration of endometriosis, with possible lysis of adhesions, cystoscopy, along with hysteroscopy  with dilation and curettage given her irregular bleeding.    Surgical Counseling  The patient was informed of the risks and benefits of the planned procedures. The risks included but were not limited to: bleeding, infection, injury to surrounding structures potentially including the vascular, gastrointestinal, and genitourinary systems, nerve injury, possible blood transfusion and its associated risks, possible bilateral oophorectomy. Patient was counseled on the possibility of a laparotomy, and all other indicated procedures. We discussed the possibility of difficulties with anesthesia, potential exacerbations to other health conditions, and potential concern for chronic pain that follows any surgery. Patient expressed understanding of the risks involved. Her questions were answered to her satisfaction. No guarantees were made or implied. The patient consented to proceed with the planned procedures.      Follow-up: Return in about 4 weeks (around 5/23/2023) for Post-op.    Chun Dinero MD

## 2023-04-28 DIAGNOSIS — R74.8 ELEVATED LIVER ENZYMES: Primary | ICD-10-CM

## 2023-05-15 ENCOUNTER — PRE-ADMISSION TESTING (OUTPATIENT)
Dept: PREADMISSION TESTING | Facility: HOSPITAL | Age: 28
End: 2023-05-15
Payer: COMMERCIAL

## 2023-05-15 VITALS
DIASTOLIC BLOOD PRESSURE: 94 MMHG | WEIGHT: 135.14 LBS | SYSTOLIC BLOOD PRESSURE: 122 MMHG | BODY MASS INDEX: 25.52 KG/M2 | HEART RATE: 95 BPM | RESPIRATION RATE: 18 BRPM | OXYGEN SATURATION: 99 % | HEIGHT: 61 IN

## 2023-05-15 DIAGNOSIS — R10.2 PELVIC PAIN IN FEMALE: ICD-10-CM

## 2023-05-15 LAB — B-HCG UR QL: NEGATIVE

## 2023-05-15 PROCEDURE — 85027 COMPLETE CBC AUTOMATED: CPT | Performed by: OBSTETRICS & GYNECOLOGY

## 2023-05-15 PROCEDURE — 81025 URINE PREGNANCY TEST: CPT

## 2023-05-15 PROCEDURE — 80048 BASIC METABOLIC PNL TOTAL CA: CPT | Performed by: OBSTETRICS & GYNECOLOGY

## 2023-05-22 ENCOUNTER — ANESTHESIA EVENT (OUTPATIENT)
Dept: PERIOP | Facility: HOSPITAL | Age: 28
End: 2023-05-22
Payer: COMMERCIAL

## 2023-05-22 ENCOUNTER — ANESTHESIA (OUTPATIENT)
Dept: PERIOP | Facility: HOSPITAL | Age: 28
End: 2023-05-22
Payer: COMMERCIAL

## 2023-05-22 ENCOUNTER — HOSPITAL ENCOUNTER (OUTPATIENT)
Facility: HOSPITAL | Age: 28
Setting detail: HOSPITAL OUTPATIENT SURGERY
Discharge: HOME | End: 2023-05-22
Attending: OBSTETRICS & GYNECOLOGY | Admitting: OBSTETRICS & GYNECOLOGY
Payer: COMMERCIAL

## 2023-05-22 VITALS
SYSTOLIC BLOOD PRESSURE: 99 MMHG | RESPIRATION RATE: 18 BRPM | DIASTOLIC BLOOD PRESSURE: 61 MMHG | OXYGEN SATURATION: 99 % | TEMPERATURE: 97.6 F | HEART RATE: 96 BPM

## 2023-05-22 DIAGNOSIS — R10.2 PELVIC PAIN IN FEMALE: ICD-10-CM

## 2023-05-22 DIAGNOSIS — N73.6 PELVIC ADHESIVE DISEASE: Primary | ICD-10-CM

## 2023-05-22 LAB
ABO GROUP BLD: NORMAL
B-HCG UR QL: NEGATIVE
BLD GP AB SCN SERPL QL: NEGATIVE
RH BLD: POSITIVE
T&S EXPIRATION DATE: NORMAL

## 2023-05-22 PROCEDURE — 86901 BLOOD TYPING SEROLOGIC RH(D): CPT | Performed by: OBSTETRICS & GYNECOLOGY

## 2023-05-22 PROCEDURE — 25010000002 DROPERIDOL PER 5 MG: Performed by: ANESTHESIOLOGY

## 2023-05-22 PROCEDURE — 81025 URINE PREGNANCY TEST: CPT | Performed by: OBSTETRICS & GYNECOLOGY

## 2023-05-22 PROCEDURE — 25010000002 ONDANSETRON PER 1 MG: Performed by: ANESTHESIOLOGY

## 2023-05-22 PROCEDURE — 25010000002 FENTANYL CITRATE (PF) 100 MCG/2ML SOLUTION: Performed by: NURSE ANESTHETIST, CERTIFIED REGISTERED

## 2023-05-22 PROCEDURE — 25010000002 KETOROLAC TROMETHAMINE PER 15 MG: Performed by: NURSE ANESTHETIST, CERTIFIED REGISTERED

## 2023-05-22 PROCEDURE — 25010000002 FENTANYL CITRATE (PF) 50 MCG/ML SOLUTION: Performed by: ANESTHESIOLOGY

## 2023-05-22 PROCEDURE — 49320 DIAG LAPARO SEPARATE PROC: CPT | Performed by: OBSTETRICS & GYNECOLOGY

## 2023-05-22 PROCEDURE — 58558 HYSTEROSCOPY BIOPSY: CPT | Performed by: OBSTETRICS & GYNECOLOGY

## 2023-05-22 PROCEDURE — 25010000002 DROPERIDOL PER 5 MG: Performed by: NURSE ANESTHETIST, CERTIFIED REGISTERED

## 2023-05-22 PROCEDURE — 25010000002 PROMETHAZINE PER 50 MG: Performed by: ANESTHESIOLOGY

## 2023-05-22 PROCEDURE — 86850 RBC ANTIBODY SCREEN: CPT | Performed by: OBSTETRICS & GYNECOLOGY

## 2023-05-22 PROCEDURE — 86900 BLOOD TYPING SEROLOGIC ABO: CPT | Performed by: OBSTETRICS & GYNECOLOGY

## 2023-05-22 PROCEDURE — S0260 H&P FOR SURGERY: HCPCS | Performed by: OBSTETRICS & GYNECOLOGY

## 2023-05-22 PROCEDURE — 25010000002 CEFAZOLIN PER 500 MG: Performed by: OBSTETRICS & GYNECOLOGY

## 2023-05-22 PROCEDURE — 88305 TISSUE EXAM BY PATHOLOGIST: CPT | Performed by: OBSTETRICS & GYNECOLOGY

## 2023-05-22 PROCEDURE — 25010000002 PROPOFOL 10 MG/ML EMULSION: Performed by: NURSE ANESTHETIST, CERTIFIED REGISTERED

## 2023-05-22 RX ORDER — HYDROCODONE BITARTRATE AND ACETAMINOPHEN 7.5; 325 MG/1; MG/1
2 TABLET ORAL EVERY 4 HOURS PRN
Status: DISCONTINUED | OUTPATIENT
Start: 2023-05-22 | End: 2023-05-22 | Stop reason: HOSPADM

## 2023-05-22 RX ORDER — LIDOCAINE HYDROCHLORIDE 10 MG/ML
0.5 INJECTION, SOLUTION EPIDURAL; INFILTRATION; INTRACAUDAL; PERINEURAL ONCE AS NEEDED
Status: DISCONTINUED | OUTPATIENT
Start: 2023-05-22 | End: 2023-05-22 | Stop reason: HOSPADM

## 2023-05-22 RX ORDER — FENTANYL CITRATE 50 UG/ML
25 INJECTION, SOLUTION INTRAMUSCULAR; INTRAVENOUS
Status: DISCONTINUED | OUTPATIENT
Start: 2023-05-22 | End: 2023-05-22 | Stop reason: HOSPADM

## 2023-05-22 RX ORDER — HYDROCODONE BITARTRATE AND ACETAMINOPHEN 5; 325 MG/1; MG/1
1 TABLET ORAL ONCE AS NEEDED
Status: DISCONTINUED | OUTPATIENT
Start: 2023-05-22 | End: 2023-05-22 | Stop reason: HOSPADM

## 2023-05-22 RX ORDER — FENTANYL CITRATE 50 UG/ML
INJECTION, SOLUTION INTRAMUSCULAR; INTRAVENOUS AS NEEDED
Status: DISCONTINUED | OUTPATIENT
Start: 2023-05-22 | End: 2023-05-22 | Stop reason: SURG

## 2023-05-22 RX ORDER — BUPIVACAINE HYDROCHLORIDE AND EPINEPHRINE 5; 5 MG/ML; UG/ML
INJECTION, SOLUTION PERINEURAL AS NEEDED
Status: DISCONTINUED | OUTPATIENT
Start: 2023-05-22 | End: 2023-05-22 | Stop reason: HOSPADM

## 2023-05-22 RX ORDER — LIDOCAINE HYDROCHLORIDE 20 MG/ML
INJECTION, SOLUTION EPIDURAL; INFILTRATION; INTRACAUDAL; PERINEURAL AS NEEDED
Status: DISCONTINUED | OUTPATIENT
Start: 2023-05-22 | End: 2023-05-22 | Stop reason: SURG

## 2023-05-22 RX ORDER — ACETAMINOPHEN 500 MG
1000 TABLET ORAL ONCE
Status: COMPLETED | OUTPATIENT
Start: 2023-05-22 | End: 2023-05-22

## 2023-05-22 RX ORDER — DROPERIDOL 2.5 MG/ML
0.62 INJECTION, SOLUTION INTRAMUSCULAR; INTRAVENOUS ONCE AS NEEDED
Status: COMPLETED | OUTPATIENT
Start: 2023-05-22 | End: 2023-05-22

## 2023-05-22 RX ORDER — ONDANSETRON 2 MG/ML
4 INJECTION INTRAMUSCULAR; INTRAVENOUS ONCE AS NEEDED
Status: DISCONTINUED | OUTPATIENT
Start: 2023-05-22 | End: 2023-05-22 | Stop reason: HOSPADM

## 2023-05-22 RX ORDER — IBUPROFEN 600 MG/1
600 TABLET ORAL EVERY 6 HOURS PRN
Qty: 40 TABLET | Refills: 0 | Status: SHIPPED | OUTPATIENT
Start: 2023-05-22

## 2023-05-22 RX ORDER — PROPOFOL 10 MG/ML
VIAL (ML) INTRAVENOUS AS NEEDED
Status: DISCONTINUED | OUTPATIENT
Start: 2023-05-22 | End: 2023-05-22 | Stop reason: SURG

## 2023-05-22 RX ORDER — KETOROLAC TROMETHAMINE 30 MG/ML
INJECTION, SOLUTION INTRAMUSCULAR; INTRAVENOUS AS NEEDED
Status: DISCONTINUED | OUTPATIENT
Start: 2023-05-22 | End: 2023-05-22 | Stop reason: SURG

## 2023-05-22 RX ORDER — MIDAZOLAM HYDROCHLORIDE 1 MG/ML
1 INJECTION INTRAMUSCULAR; INTRAVENOUS
Status: DISCONTINUED | OUTPATIENT
Start: 2023-05-22 | End: 2023-05-22 | Stop reason: HOSPADM

## 2023-05-22 RX ORDER — TRAMADOL HYDROCHLORIDE 50 MG/1
50 TABLET ORAL EVERY 6 HOURS PRN
Qty: 12 TABLET | Refills: 0 | Status: SHIPPED | OUTPATIENT
Start: 2023-05-22 | End: 2024-05-21

## 2023-05-22 RX ORDER — SODIUM CHLORIDE 9 MG/ML
100 INJECTION, SOLUTION INTRAVENOUS CONTINUOUS
Status: DISCONTINUED | OUTPATIENT
Start: 2023-05-22 | End: 2023-05-22 | Stop reason: HOSPADM

## 2023-05-22 RX ORDER — ONDANSETRON 4 MG/1
4 TABLET, ORALLY DISINTEGRATING ORAL EVERY 8 HOURS PRN
Qty: 30 TABLET | Refills: 0 | Status: SHIPPED | OUTPATIENT
Start: 2023-05-22

## 2023-05-22 RX ORDER — ONDANSETRON 2 MG/ML
4 INJECTION INTRAMUSCULAR; INTRAVENOUS ONCE AS NEEDED
Status: COMPLETED | OUTPATIENT
Start: 2023-05-22 | End: 2023-05-22

## 2023-05-22 RX ORDER — SODIUM CHLORIDE 0.9 % (FLUSH) 0.9 %
3-10 SYRINGE (ML) INJECTION AS NEEDED
Status: DISCONTINUED | OUTPATIENT
Start: 2023-05-22 | End: 2023-05-22 | Stop reason: HOSPADM

## 2023-05-22 RX ORDER — SODIUM CHLORIDE 9 MG/ML
40 INJECTION, SOLUTION INTRAVENOUS AS NEEDED
Status: DISCONTINUED | OUTPATIENT
Start: 2023-05-22 | End: 2023-05-22 | Stop reason: HOSPADM

## 2023-05-22 RX ORDER — FLUMAZENIL 0.1 MG/ML
0.2 INJECTION INTRAVENOUS AS NEEDED
Status: DISCONTINUED | OUTPATIENT
Start: 2023-05-22 | End: 2023-05-22 | Stop reason: HOSPADM

## 2023-05-22 RX ORDER — ATRACURIUM BESYLATE 10 MG/ML
INJECTION, SOLUTION INTRAVENOUS AS NEEDED
Status: DISCONTINUED | OUTPATIENT
Start: 2023-05-22 | End: 2023-05-22 | Stop reason: SURG

## 2023-05-22 RX ORDER — MAGNESIUM HYDROXIDE 1200 MG/15ML
LIQUID ORAL AS NEEDED
Status: DISCONTINUED | OUTPATIENT
Start: 2023-05-22 | End: 2023-05-22 | Stop reason: HOSPADM

## 2023-05-22 RX ORDER — SODIUM CHLORIDE, SODIUM LACTATE, POTASSIUM CHLORIDE, CALCIUM CHLORIDE 600; 310; 30; 20 MG/100ML; MG/100ML; MG/100ML; MG/100ML
1000 INJECTION, SOLUTION INTRAVENOUS CONTINUOUS
Status: DISCONTINUED | OUTPATIENT
Start: 2023-05-22 | End: 2023-05-22 | Stop reason: HOSPADM

## 2023-05-22 RX ORDER — NALOXONE HCL 0.4 MG/ML
0.4 VIAL (ML) INJECTION AS NEEDED
Status: DISCONTINUED | OUTPATIENT
Start: 2023-05-22 | End: 2023-05-22 | Stop reason: HOSPADM

## 2023-05-22 RX ORDER — IBUPROFEN 600 MG/1
600 TABLET ORAL ONCE AS NEEDED
Status: DISCONTINUED | OUTPATIENT
Start: 2023-05-22 | End: 2023-05-22 | Stop reason: HOSPADM

## 2023-05-22 RX ORDER — SODIUM CHLORIDE 0.9 % (FLUSH) 0.9 %
1-10 SYRINGE (ML) INJECTION AS NEEDED
Status: DISCONTINUED | OUTPATIENT
Start: 2023-05-22 | End: 2023-05-22 | Stop reason: HOSPADM

## 2023-05-22 RX ORDER — DROPERIDOL 2.5 MG/ML
INJECTION, SOLUTION INTRAMUSCULAR; INTRAVENOUS AS NEEDED
Status: DISCONTINUED | OUTPATIENT
Start: 2023-05-22 | End: 2023-05-22 | Stop reason: SURG

## 2023-05-22 RX ORDER — LABETALOL HYDROCHLORIDE 5 MG/ML
5 INJECTION, SOLUTION INTRAVENOUS
Status: DISCONTINUED | OUTPATIENT
Start: 2023-05-22 | End: 2023-05-22 | Stop reason: HOSPADM

## 2023-05-22 RX ORDER — SODIUM CHLORIDE 0.9 % (FLUSH) 0.9 %
10 SYRINGE (ML) INJECTION EVERY 12 HOURS SCHEDULED
Status: DISCONTINUED | OUTPATIENT
Start: 2023-05-22 | End: 2023-05-22 | Stop reason: HOSPADM

## 2023-05-22 RX ORDER — MIDAZOLAM HYDROCHLORIDE 1 MG/ML
1 INJECTION INTRAMUSCULAR; INTRAVENOUS ONCE
Status: DISCONTINUED | OUTPATIENT
Start: 2023-05-22 | End: 2023-05-22 | Stop reason: HOSPADM

## 2023-05-22 RX ORDER — SODIUM CHLORIDE, SODIUM LACTATE, POTASSIUM CHLORIDE, CALCIUM CHLORIDE 600; 310; 30; 20 MG/100ML; MG/100ML; MG/100ML; MG/100ML
100 INJECTION, SOLUTION INTRAVENOUS CONTINUOUS
Status: DISCONTINUED | OUTPATIENT
Start: 2023-05-22 | End: 2023-05-22 | Stop reason: HOSPADM

## 2023-05-22 RX ORDER — ULTRASOUND COUPLING MEDIUM
GEL (GRAM) TOPICAL AS NEEDED
Status: DISCONTINUED | OUTPATIENT
Start: 2023-05-22 | End: 2023-05-22 | Stop reason: HOSPADM

## 2023-05-22 RX ORDER — SODIUM CHLORIDE 0.9 % (FLUSH) 0.9 %
3 SYRINGE (ML) INJECTION EVERY 12 HOURS SCHEDULED
Status: DISCONTINUED | OUTPATIENT
Start: 2023-05-22 | End: 2023-05-22 | Stop reason: HOSPADM

## 2023-05-22 RX ADMIN — SODIUM CHLORIDE, POTASSIUM CHLORIDE, SODIUM LACTATE AND CALCIUM CHLORIDE 1000 ML: 600; 310; 30; 20 INJECTION, SOLUTION INTRAVENOUS at 07:59

## 2023-05-22 RX ADMIN — PROPOFOL 200 MG: 10 INJECTION, EMULSION INTRAVENOUS at 12:41

## 2023-05-22 RX ADMIN — LIDOCAINE HYDROCHLORIDE 200 MG: 20 INJECTION, SOLUTION EPIDURAL; INFILTRATION; INTRACAUDAL; PERINEURAL at 12:41

## 2023-05-22 RX ADMIN — FENTANYL CITRATE 100 MCG: 50 INJECTION, SOLUTION INTRAMUSCULAR; INTRAVENOUS at 12:41

## 2023-05-22 RX ADMIN — CEFAZOLIN 2 G: 2 INJECTION, POWDER, FOR SOLUTION INTRAMUSCULAR; INTRAVENOUS at 12:38

## 2023-05-22 RX ADMIN — ACETAMINOPHEN 1000 MG: 500 TABLET, FILM COATED ORAL at 10:24

## 2023-05-22 RX ADMIN — FENTANYL CITRATE 25 MCG: 50 INJECTION, SOLUTION INTRAMUSCULAR; INTRAVENOUS at 13:48

## 2023-05-22 RX ADMIN — DROPERIDOL 0.62 MG: 2.5 INJECTION, SOLUTION INTRAMUSCULAR; INTRAVENOUS at 13:37

## 2023-05-22 RX ADMIN — ONDANSETRON 4 MG: 2 INJECTION INTRAMUSCULAR; INTRAVENOUS at 13:45

## 2023-05-22 RX ADMIN — DROPERIDOL 0.62 MG: 2.5 INJECTION, SOLUTION INTRAMUSCULAR; INTRAVENOUS at 10:24

## 2023-05-22 RX ADMIN — FENTANYL CITRATE 25 MCG: 50 INJECTION, SOLUTION INTRAMUSCULAR; INTRAVENOUS at 13:38

## 2023-05-22 RX ADMIN — FENTANYL CITRATE 25 MCG: 50 INJECTION, SOLUTION INTRAMUSCULAR; INTRAVENOUS at 13:43

## 2023-05-22 RX ADMIN — PROMETHAZINE HYDROCHLORIDE 6.25 MG: 25 INJECTION INTRAMUSCULAR; INTRAVENOUS at 14:09

## 2023-05-22 RX ADMIN — DROPERIDOL 1.25 MG: 2.5 INJECTION, SOLUTION INTRAMUSCULAR; INTRAVENOUS at 12:41

## 2023-05-22 RX ADMIN — KETOROLAC TROMETHAMINE 60 MG: 60 INJECTION, SOLUTION INTRAMUSCULAR at 13:16

## 2023-05-22 RX ADMIN — ATRACURIUM BESYLATE 20 MG: 10 INJECTION, SOLUTION INTRAVENOUS at 12:41

## 2023-05-22 NOTE — OP NOTE
[facility]  Nai Golden  : 1995  MRN: [id number]  CSN: [id number]  Date: 2022    Operative Note    Pre-op Diagnosis:  Pelvic pain in female [R10.2]  Irregular menses   Post-op Diagnosis:  Pelvic pain  Irregular menses  Pelvic adhesive disease   Procedure: Procedure(s):  DIAGNOSTIC LAPAROSCOPY WITH LYSIS OF ADHESION  CYSTOSCOPY  DILATATION AND CURETTAGE HYSTEROSCOPY     Surgeon: Surgeon(s):  Chun Dinero MD       Anesthesia: General     Estimated Blood Loss: 10 mL   Fluids: 300 mL   UOP: 150 mls   Drains: none   ABx: Not indicated     Specimens:  1) endocervical curettings  2) endometrial curettings   Findings: Bimanual exam revealed mobile, normal uterus without palpable adnexal masses. Ovaries palpated normal. Normal appearing uterus and bilateral ovarian and fallopian tubes. Omentum with adhesion near the umbilicus. Liver, stomach, and appendix appeared normal. No findings c/w endometriosis. Cystoscopy with no tumors or masses, bilateral spill of urine from each ureteral orifice, no signs of interstitial cystitic. Hysteroscopy with normal appearing endometrium and bilateral tubal ostia.    Complications: none     INDICATION: Nai Golden is a 27 y.o. female with pelvic pain and irregular menses for evaluation. .    PROCEDURE IN DETAIL:  The patient was taken to the operating room where a timeout was performed to confirm the correct patient and the correct procedure.  Preoperative prophylactic intravenous antibiotics are not indicated and were not given to the patient.  The patient was then placed in the dorsal supine position and care was taken to pad all pressure points.  General anesthesia was established.  Warm blankets was placed to maintain control of core body temperature.  The patient was placed in the dorsal lithotomy position using Emeka stirrups. A bimanual exam was performed, findings as noted above. She was prepped and draped in the usual sterile fashion.      Attention was then turned to the abdomen where a 5 mm vertical incision was made supraumbilically. Under direct visualization using the optical trocar, direct entry with the trocar and camera were inserted uneventfully into the peritoneal cavity. The peritoneal cavity was then insufflated with CO2 gas to a maximum pressure of 15 mmHg.  Opening pressure <8 mmHg/ Examination of the peritoneal cavity revealed no signs of injury from entry. Findings on inspection of the abdominal cavity as noted above.     Attention was then turned to the pelvis. The patient was placed into Trendelenburg position. The uterus was elevated and the fallopian tubes and ovaries were visualized bilaterally. Pelvic findings were appreciated as noted above. Subsequently, one additional small incisions was made in the left mid abdomen/upper quadrant in a similar fashion as initial trocar insertion. A 5 mm trocar was introduced under direct visualization.  Pelvic omental adhesion was sharply dissected off the anterior abdominal wall. Hemostasis noted.  Pneumoperitoneum was then evacuated. The laparoscope was removed and the trocar sleeves were removed. The skin incisions were closed using 4-O monocryl and then covered with steri-strips and Tegaderm.     Attention was then turned to there perineum. Cystoscopy was performed with normal saline as distending media. The above findings were noted. The bladder was drained and the cystoscope was removed. Speculum was placed and the cervix was grasped with a single-tooth tenaculum. Cervix dilated with leiva dilators to allow introduction of the hysteroscope. Hysteroscopy was performed with normal saline as the distending media. The above findings were appreciated. Hysteroscope removed. Sharp  curettage was performed, first for endocervical specimen and then for endometrial specimen. This concluded the procedure. Single tooth tenaculum removed. Tenaculum sites hemostatic.     All needle, sponge, and  instrument counts were noted to be correct x 3 at the end of the procedure. The patient tolerated the procedure well and was taken to the recovery room in stable condition.        Chun Dinero MD   5/22/2023  13:25 CDT

## 2023-05-22 NOTE — ANESTHESIA POSTPROCEDURE EVALUATION
Patient: Nai Golden    Procedure Summary       Date: 05/22/23 Room / Location: [facility] OR  /  [facility] OR    Anesthesia Start: 1238 Anesthesia Stop: 1328    Procedures:       DIAGNOSTIC LAPAROSCOPY WITH LYSIS OF ADHESION (Abdomen)      CYSTOSCOPY (Urethra)      DILATATION AND CURETTAGE HYSTEROSCOPY (Uterus) Diagnosis:       Pelvic pain in female      (Pelvic pain in female [R10.2])    Surgeons: Chun Dinero MD Provider: Henri Clarke CRNA    Anesthesia Type: general ASA Status: 2            Anesthesia Type: general    Vitals  Vitals Value Taken Time   /65 05/22/23 1328   Temp     Pulse 120 05/22/23 1329   Resp     SpO2 96 % 05/22/23 1329   Vitals shown include unvalidated device data.        Post Anesthesia Care and Evaluation    Patient location during evaluation: PACU  Patient participation: complete - patient participated  Level of consciousness: awake and alert  Pain score: 0  Pain management: adequate    Airway patency: patent  Anesthetic complications: No anesthetic complications    Cardiovascular status: acceptable and stable  Respiratory status: room air  Hydration status: acceptable

## 2023-05-22 NOTE — NURSING NOTE
Dr. Bautista notified of pt nausea and pain in PACU. Dr. Bautista reviewed chart. States he will place orders for phenergan. Pt states she is able to tolerate phenergan. Pt has fan, ice pack to neck, and cool wash cloths. Pt vomiting yellow bile into emesis bag. Pt attempted to wet mouth with an ice chip and immediately had to spit it out. Pt encouraged to take slow deep breaths.

## 2023-05-22 NOTE — H&P
[facility]   HISTORY AND PHYSICAL    Patient Name:Nai Golden  : 1995  MRN: [id number]  Primary Care Physician: Ju Garibay APRN  Date of admission: 2023    Subjective   Subjective     Chief Complaint: pelvic pain    History of Present Illness   Nai Golden is a 27 y.o. female  with pelvic pain. Patient for the past couple of months has been noticing increasing pelvic pain.  Pain is worse just prior to menses.  Has this bilaterally.  Menses will begin and her pain does subside.  Of note, she does state that during her menses, she does have significant dysmenorrhea and is having to use a heating pad for this.  She states she has wanted to miss work because of her pain but has been unable to secondary to financials.  Notes pain is bilateral deep in the pelvis.  This is barely alleviated with over-the-counter medications.  Prior to this, she has had no episodes of the above.  She does have IBS but states this is a different sensation/different kind of pain.  She does not endorse dyspareunia as well.  Pain occasionally associated with bowel movements and voids.  Currently bleeding at this time.Patient's last menstrual period was 2023 (exact date).  Irregular cycles for the past 6 weeks.  Has a cycle every 2 to 3 weeks.  Partner has a vasectomy.  She reports negative pregnancy testing.     Review of Systems    Genitourinary: Positive for dyspareunia, menstrual problem, pelvic pain and vaginal bleeding. Negative for decreased urine volume, difficulty urinating, dysuria, enuresis, flank pain, frequency, genital sores, hematuria, urgency, vaginal discharge and vaginal pain.   All other systems reviewed and are negative.    Personal History     Past Medical History:   Diagnosis Date    Anemia     Depression     Dyspepsia     Samantha-Danlos syndrome     2018    Gastroparesis     GERD (gastroesophageal reflux disease)     IBS (irritable bowel syndrome)      Migraine     PONV (postoperative nausea and vomiting)     Postural orthostatic tachycardia syndrome     Scoliosis deformity of spine 2017       Past Surgical History:   Procedure Laterality Date     SECTION      CHOLECYSTECTOMY      COLONOSCOPY N/A 2020    Procedure: COLONOSCOPY WITH ANESTHESIA;  Surgeon: Ronaldo Cole DO;  Location: [facility] ENDOSCOPY;  Service: Gastroenterology;  Laterality: N/A;  preop; altered bowel  postop; normal   PCP Ju Garibay,     ENDOSCOPY  2016    ENDOSCOPY N/A 2017    Procedure: ESOPHAGOGASTRODUODENOSCOPY possible dilation ;  Surgeon: Franky Berman MD;  Location: [facility] ENDOSCOPY;  Service:     ENDOSCOPY N/A 2022    Procedure: ESOPHAGOGASTRODUODENOSCOPY WITH ANESTHESIA;  Surgeon: Ronaldo Cole DO;  Location: [facility] ENDOSCOPY;  Service: Gastroenterology;  Laterality: N/A;  pre gerd  post normal  Ju Sandhu    TONSILLECTOMY      VENOUS ACCESS DEVICE (PORT) INSERTION N/A 2022    Procedure: INSERTION VENOUS ACCESS DEVICE (PORT-A-CATH);  Surgeon: Jordan Baez MD;  Location: [facility] HYBRID OR 12;  Service: Vascular;  Laterality: N/A;       Family History: Her family history includes Arthritis in her father; Breast cancer (age of onset: 49) in her mother; Colon polyps in her mother; Diabetes in her paternal grandfather; Heart disease in her father; Hyperlipidemia in her father; Hypertension in her father, mother, and paternal grandfather; Migraines in her father; No Known Problems in her brother, sister, sister, and sister; Seizures in her nephew.     Social History: She  reports that she has never smoked. She has never used smokeless tobacco. She reports that she does not drink alcohol and does not use drugs.    Home Medications:  Galcanezumab-gnlm, Lidocaine (Anorectal), NON FORMULARY, Rimegepant Sulfate, armodafinil, betamethasone valerate, cyanocobalamin, desvenlafaxine, famotidine, levocetirizine,  lubiprostone, meclizine, ondansetron, pantoprazole, and vitamin D    Allergies:  She is allergic to adhesive tape, compazine [prochlorperazine edisylate], percocet [oxycodone-acetaminophen], and scopolamine.    Objective    Objective     Vitals:    Temp:  [98.1 °F (36.7 °C)] 98.1 °F (36.7 °C)  Heart Rate:  [86-88] 88  Resp:  [18] 18  BP: (114)/(79) 114/79    Physical Exam   Constitutional:       General: She is not in acute distress.     Appearance: Normal appearance. She is not ill-appearing.   HENT:      Head: Normocephalic and atraumatic.      Nose: No congestion or rhinorrhea.   Eyes:      General: No scleral icterus.        Right eye: No discharge.         Left eye: No discharge.      Extraocular Movements: Extraocular movements intact.      Conjunctiva/sclera: Conjunctivae normal.   Pulmonary:      Effort: Pulmonary effort is normal. No accessory muscle usage or respiratory distress.   Musculoskeletal:      Right lower leg: No edema.      Left lower leg: No edema.   Skin:     General: Skin is warm and dry.      Coloration: Skin is not ashen, cyanotic or jaundiced.   Neurological:      General: No focal deficit present.      Mental Status: She is alert and oriented to person, place, and time.   Psychiatric:         Mood and Affect: Mood normal.         Behavior: Behavior is cooperative.     Result Review    US Non-ob Transvaginal (03/21/2023 08:12)  1.  Uterus: Normal size and Anteverted  2.  Endometrium: 7.4 mm   3.  Myometrium: Normal homogenous texture   4.  Ovaries  Left:    Normal/unremarkable   Right:  Normal/unremarkable   Multiple small follicles noted bilaterally      Assessment & Plan   Assessment / Plan     Brief Patient Summary:  Nai Golden is a 27 y.o. female with pelvic pain and irregular bleeding.     Active Hospital Problems:  Active Hospital Problems    Diagnosis     **Pelvic and perineal pain      Plan:   Diagnostic laparoscopy, possible lysis of adhesions, possible fulguration  of endometriosis, cystoscopy, hysteroscopy, D&C    DVT prophylaxis:  Mechanical DVT prophylaxis orders are present.    Surgical Counseling  The patient was informed of the risks and benefits of the planned procedures. The risks included but were not limited to: bleeding, infection, injury to surrounding structures potentially including the vascular, gastrointestinal, and genitourinary systems, nerve injury, possible blood transfusion and its associated risks, possible bilateral oophorectomy. Patient was counseled on the possibility of a laparotomy, and all other indicated procedures. We discussed the possibility of difficulties with anesthesia, potential exacerbations to other health conditions, and potential concern for chronic pain that follows any surgery. Discussed recovery expectations as well as limitations following surgery. Patient expressed understanding of the risks involved with surgery as well as the surgery itself. Her questions were answered to her satisfaction. No guarantees were made or implied. The patient consented to proceed with the planned procedures.      Chun Dinero MD

## 2023-05-22 NOTE — ANESTHESIA PROCEDURE NOTES
Airway  Urgency: elective    Date/Time: 5/22/2023 12:41 PM  Airway not difficult    General Information and Staff    Patient location during procedure: OR  CRNA/CAA: Henri Clarke CRNA    Indications and Patient Condition  Indications for airway management: airway protection    Preoxygenated: yes  MILS maintained throughout  Mask difficulty assessment: 1 - vent by mask    Final Airway Details  Final airway type: endotracheal airway      Successful airway: ETT  Cuffed: yes   Successful intubation technique: direct laryngoscopy  Endotracheal tube insertion site: oral  Blade: Anaya  Blade size: 2  ETT size (mm): 7.0  Cormack-Lehane Classification: grade I - full view of glottis  Placement verified by: chest auscultation and capnometry   Cuff volume (mL): 5  Measured from: lips  ETT/EBT  to lips (cm): 20  Number of attempts at approach: 1  Assessment: lips, teeth, and gum same as pre-op and atraumatic intubation

## 2023-05-22 NOTE — DISCHARGE SUMMARY
[facility]    Chun Dinero MD  [address]  [place] [address]  [phone]      Discharge Summary      Nai Golden  : 1995  MRN: [id number]  CSN: [id number]    Date of Admission: 2023   Date of Discharge:  2023           Admission Diagnosis: Pelvic pain in female [R10.2]  Irregular menses     Discharge Diagnosis: Pelvic pain  Irregular menses  Pelvic adhesion             Presenting Problem/History of Present Illness  Active Hospital Problems    Diagnosis  POA    **Pelvic and perineal pain [R10.2]  Unknown    Pelvic adhesive disease [N73.6]  Unknown      Resolved Hospital Problems   No resolved problems to display.        Hospital Course  Patient is a 27 y.o.  who presented for scheduled diagnostic laparoscopy, cystoscopy, hysteroscopy D&C secondary to pelvic pain and irregular menses. See operative report for details. She tolerated the procedure well and is stable for discharge home.    Procedures Performed  Procedure(s):  DIAGNOSTIC LAPAROSCOPY WITH LYSIS OF ADHESION  CYSTOSCOPY  DILATATION AND CURETTAGE HYSTEROSCOPY    Consults:   Consults       No orders found from 2023 to 2023.            Condition on Discharge:  Stable    Vital Signs  Temp:  [97.6 °F (36.4 °C)-98.1 °F (36.7 °C)] 97.6 °F (36.4 °C)  Heart Rate:  [] 116  Resp:  [18-19] 19  BP: (114-119)/(65-79) 119/65    Physical Exam:   No exam performed today,    Discharge Disposition  Stable for discharge home     Discharge Medications     Discharge Medications        New Medications        Instructions Start Date   ibuprofen 600 MG tablet  Commonly known as: ADVIL,MOTRIN   600 mg, Oral, Every 6 Hours PRN      ondansetron ODT 4 MG disintegrating tablet  Commonly known as: ZOFRAN-ODT   4 mg, Translingual, Every 8 Hours PRN      traMADol 50 MG tablet  Commonly known as: Ultram   50 mg, Oral, Every 6 Hours PRN             Continue These Medications        Instructions  Start Date   armodafinil 150 MG tablet  Commonly known as: Nuvigil   150 mg, Oral, Daily      betamethasone valerate 0.1 % ointment  Commonly known as: VALISONE   1 application, Every 24 Hours      desvenlafaxine 50 MG 24 hr tablet  Commonly known as: PRISTIQ   50 mg, Oral, Daily      Emgality 120 MG/ML solution prefilled syringe  Generic drug: Galcanezumab-gnlm   120 mg, Subcutaneous, Every 30 Days      Emgality 120 MG/ML solution prefilled syringe  Generic drug: Galcanezumab-gnlm   120 mg, Subcutaneous, Every 30 Days      famotidine 40 MG tablet  Commonly known as: PEPCID   40 mg, Oral, 2 Times Daily      LC-5 Lidocaine 5 % cream cream  Generic drug: Lidocaine (Anorectal)   Apply Daily As Needed for port access.      levocetirizine 5 MG tablet  Commonly known as: XYZAL   Every 24 Hours      lubiprostone 24 MCG capsule  Commonly known as: Amitiza   Take 1 capsule by mouth 2 (Two) Times a Day with food and water      meclizine 25 MG tablet  Commonly known as: ANTIVERT   25 mg, Oral, Every 12 Hours      NON FORMULARY   10 mg, 4 Times Daily, Domperidone      Nurtec 75 MG tablet dispersible tablet  Generic drug: Rimegepant Sulfate   Place 1 tablet on the tongue and allow to dissolve, max of 1 tablet in 24 hours      ondansetron 8 MG tablet  Commonly known as: ZOFRAN   Take 1 tablet by mouth Every 6 (Six) Hours to 8 (Eight) As Needed.      pantoprazole 40 MG EC tablet  Commonly known as: PROTONIX   40 mg, Oral, Daily      vitamin D 1.25 MG (72585 UT) capsule capsule  Commonly known as: ERGOCALCIFEROL   50,000 Units, Oral, Weekly             ASK your doctor about these medications        Instructions Start Date   cyanocobalamin 1000 MCG/ML injection   1,000 mcg, Intramuscular, Every 14 Days               Discharge Diet: regular home diet    Activity at Discharge: pelvic rest    Follow-up Appointments  Future Appointments   Date Time Provider Department Center   6/20/2023  8:00 AM Chun Dinero MD [facility]          Test Results Pending at Discharge  Pending Labs       Order Current Status    Tissue Pathology Exam Collected (05/22/23 4455)             Chun Dinero MD  05/22/23  13:31 CDT    Time: Discharge <30 min

## 2023-05-22 NOTE — NURSING NOTE
Pt rates pain 7/10. With every dose of IV fentanyl administered pt has began vomiting. Pt instructed to notify nursing staff when she feels she can take the prn norco that is ordered. Pt verbalizes understanding.

## 2023-05-22 NOTE — ANESTHESIA PREPROCEDURE EVALUATION
Anesthesia Evaluation     Patient summary reviewed   history of anesthetic complications:  PONV  NPO Solid Status: > 8 hours             Airway   Mallampati: I  Dental - normal exam     Pulmonary - negative pulmonary ROS   (-) not a smoker  Cardiovascular - negative cardio ROS  Exercise tolerance: excellent (>7 METS)    (-) pacemaker, CAD, dysrhythmias, cardiac stents    ROS comment: POTS    Neuro/Psych- negative ROS  GI/Hepatic/Renal/Endo    (+) GERD    ROS Comment: Gastroparesis    Musculoskeletal     Abdominal    Substance History      OB/GYN          Other          Other Comment: Samantha-danlos                  Anesthesia Plan    ASA 2     general     (Background propofol gtt )  intravenous induction     Anesthetic plan, risks, benefits, and alternatives have been provided, discussed and informed consent has been obtained with: patient.      CODE STATUS:

## 2023-11-09 ENCOUNTER — LAB (OUTPATIENT)
Dept: LAB | Facility: HOSPITAL | Age: 28
End: 2023-11-09
Payer: COMMERCIAL

## 2023-11-09 DIAGNOSIS — E22.1 HYPERPROLACTINEMIA: Primary | ICD-10-CM

## 2023-11-09 DIAGNOSIS — E55.9 VITAMIN D DEFICIENCY: ICD-10-CM

## 2023-11-09 DIAGNOSIS — R23.3 EASY BRUISING: ICD-10-CM

## 2023-11-09 LAB
25(OH)D3 SERPL-MCNC: 22.9 NG/ML (ref 30–100)
ALBUMIN SERPL-MCNC: 4.6 G/DL (ref 3.5–5.2)
ALBUMIN/GLOB SERPL: 1.8 G/DL
ALP SERPL-CCNC: 101 U/L (ref 39–117)
ALT SERPL W P-5'-P-CCNC: 13 U/L (ref 1–33)
ANION GAP SERPL CALCULATED.3IONS-SCNC: 9 MMOL/L (ref 5–15)
APTT PPP: 29.6 SECONDS (ref 24.5–36)
AST SERPL-CCNC: 14 U/L (ref 1–32)
BASOPHILS # BLD AUTO: 0.05 10*3/MM3 (ref 0–0.2)
BASOPHILS NFR BLD AUTO: 0.5 % (ref 0–1.5)
BILIRUB SERPL-MCNC: 0.3 MG/DL (ref 0–1.2)
BUN SERPL-MCNC: 9 MG/DL (ref 6–20)
BUN/CREAT SERPL: 13.2 (ref 7–25)
CALCIUM SPEC-SCNC: 8.9 MG/DL (ref 8.6–10.5)
CHLORIDE SERPL-SCNC: 102 MMOL/L (ref 98–107)
CO2 SERPL-SCNC: 29 MMOL/L (ref 22–29)
CREAT SERPL-MCNC: 0.68 MG/DL (ref 0.57–1)
DEPRECATED RDW RBC AUTO: 37.6 FL (ref 37–54)
EGFRCR SERPLBLD CKD-EPI 2021: 124.1 ML/MIN/1.73
EOSINOPHIL # BLD AUTO: 0.27 10*3/MM3 (ref 0–0.4)
EOSINOPHIL NFR BLD AUTO: 2.7 % (ref 0.3–6.2)
ERYTHROCYTE [DISTWIDTH] IN BLOOD BY AUTOMATED COUNT: 11.2 % (ref 12.3–15.4)
GLOBULIN UR ELPH-MCNC: 2.5 GM/DL
GLUCOSE SERPL-MCNC: 84 MG/DL (ref 65–99)
HCT VFR BLD AUTO: 39.3 % (ref 34–46.6)
HGB BLD-MCNC: 13.1 G/DL (ref 12–15.9)
IMM GRANULOCYTES # BLD AUTO: 0.04 10*3/MM3 (ref 0–0.05)
IMM GRANULOCYTES NFR BLD AUTO: 0.4 % (ref 0–0.5)
INR PPP: 0.91 (ref 0.91–1.09)
LYMPHOCYTES # BLD AUTO: 2.82 10*3/MM3 (ref 0.7–3.1)
LYMPHOCYTES NFR BLD AUTO: 28.4 % (ref 19.6–45.3)
MCH RBC QN AUTO: 30.5 PG (ref 26.6–33)
MCHC RBC AUTO-ENTMCNC: 33.3 G/DL (ref 31.5–35.7)
MCV RBC AUTO: 91.4 FL (ref 79–97)
MONOCYTES # BLD AUTO: 0.77 10*3/MM3 (ref 0.1–0.9)
MONOCYTES NFR BLD AUTO: 7.8 % (ref 5–12)
NEUTROPHILS NFR BLD AUTO: 5.97 10*3/MM3 (ref 1.7–7)
NEUTROPHILS NFR BLD AUTO: 60.2 % (ref 42.7–76)
NRBC BLD AUTO-RTO: 0 /100 WBC (ref 0–0.2)
PLATELET # BLD AUTO: 262 10*3/MM3 (ref 140–450)
PMV BLD AUTO: 9.8 FL (ref 6–12)
POTASSIUM SERPL-SCNC: 3.7 MMOL/L (ref 3.5–5.2)
PROLACTIN SERPL-MCNC: 528 NG/ML (ref 4.79–23.3)
PROT SERPL-MCNC: 7.1 G/DL (ref 6–8.5)
PROTHROMBIN TIME: 12.3 SECONDS (ref 11.8–14.8)
RBC # BLD AUTO: 4.3 10*6/MM3 (ref 3.77–5.28)
SODIUM SERPL-SCNC: 140 MMOL/L (ref 136–145)
WBC NRBC COR # BLD: 9.92 10*3/MM3 (ref 3.4–10.8)

## 2023-11-09 PROCEDURE — 80053 COMPREHEN METABOLIC PANEL: CPT | Performed by: ADVANCED PRACTICE MIDWIFE

## 2023-11-09 PROCEDURE — 85610 PROTHROMBIN TIME: CPT | Performed by: ADVANCED PRACTICE MIDWIFE

## 2023-11-09 PROCEDURE — 85730 THROMBOPLASTIN TIME PARTIAL: CPT | Performed by: ADVANCED PRACTICE MIDWIFE

## 2023-11-09 PROCEDURE — 82306 VITAMIN D 25 HYDROXY: CPT | Performed by: ADVANCED PRACTICE MIDWIFE

## 2023-11-09 PROCEDURE — 85025 COMPLETE CBC W/AUTO DIFF WBC: CPT | Performed by: ADVANCED PRACTICE MIDWIFE

## 2023-11-09 PROCEDURE — 84146 ASSAY OF PROLACTIN: CPT | Performed by: ADVANCED PRACTICE MIDWIFE

## 2023-11-09 PROCEDURE — 36415 COLL VENOUS BLD VENIPUNCTURE: CPT | Performed by: ADVANCED PRACTICE MIDWIFE

## 2023-11-12 DIAGNOSIS — E55.9 VITAMIN D DEFICIENCY: ICD-10-CM

## 2023-11-12 RX ORDER — ERGOCALCIFEROL 1.25 MG/1
50000 CAPSULE ORAL WEEKLY
Qty: 5 CAPSULE | Refills: 3 | Status: SHIPPED | OUTPATIENT
Start: 2023-11-12

## 2023-12-04 ENCOUNTER — HOSPITAL ENCOUNTER (EMERGENCY)
Facility: HOSPITAL | Age: 28
Discharge: HOME | End: 2023-12-04
Attending: STUDENT IN AN ORGANIZED HEALTH CARE EDUCATION/TRAINING PROGRAM | Admitting: STUDENT IN AN ORGANIZED HEALTH CARE EDUCATION/TRAINING PROGRAM
Payer: COMMERCIAL

## 2023-12-04 ENCOUNTER — APPOINTMENT (OUTPATIENT)
Dept: GENERAL RADIOLOGY | Facility: HOSPITAL | Age: 28
End: 2023-12-04
Payer: COMMERCIAL

## 2023-12-04 VITALS
RESPIRATION RATE: 21 BRPM | DIASTOLIC BLOOD PRESSURE: 77 MMHG | HEIGHT: 60 IN | HEART RATE: 103 BPM | SYSTOLIC BLOOD PRESSURE: 116 MMHG | WEIGHT: 137 LBS | OXYGEN SATURATION: 100 % | TEMPERATURE: 98.3 F | BODY MASS INDEX: 26.9 KG/M2

## 2023-12-04 DIAGNOSIS — R00.0 TACHYCARDIA: Primary | ICD-10-CM

## 2023-12-04 LAB
ALBUMIN SERPL-MCNC: 5 G/DL (ref 3.5–5.2)
ALBUMIN/GLOB SERPL: 1.7 G/DL
ALP SERPL-CCNC: 112 U/L (ref 39–117)
ALT SERPL W P-5'-P-CCNC: 19 U/L (ref 1–33)
ANION GAP SERPL CALCULATED.3IONS-SCNC: 14 MMOL/L (ref 5–15)
AST SERPL-CCNC: 20 U/L (ref 1–32)
BASOPHILS # BLD AUTO: 0.07 10*3/MM3 (ref 0–0.2)
BASOPHILS NFR BLD AUTO: 0.7 % (ref 0–1.5)
BILIRUB SERPL-MCNC: 0.2 MG/DL (ref 0–1.2)
BUN SERPL-MCNC: 11 MG/DL (ref 6–20)
BUN/CREAT SERPL: 16.7 (ref 7–25)
CALCIUM SPEC-SCNC: 10.1 MG/DL (ref 8.6–10.5)
CHLORIDE SERPL-SCNC: 102 MMOL/L (ref 98–107)
CO2 SERPL-SCNC: 23 MMOL/L (ref 22–29)
CREAT SERPL-MCNC: 0.66 MG/DL (ref 0.57–1)
D DIMER PPP FEU-MCNC: 0.29 MCGFEU/ML (ref 0–0.5)
DEPRECATED RDW RBC AUTO: 36.2 FL (ref 37–54)
EGFRCR SERPLBLD CKD-EPI 2021: 125 ML/MIN/1.73
EOSINOPHIL # BLD AUTO: 0.1 10*3/MM3 (ref 0–0.4)
EOSINOPHIL NFR BLD AUTO: 1 % (ref 0.3–6.2)
ERYTHROCYTE [DISTWIDTH] IN BLOOD BY AUTOMATED COUNT: 11.5 % (ref 12.3–15.4)
FLUAV RNA RESP QL NAA+PROBE: NOT DETECTED
FLUBV RNA RESP QL NAA+PROBE: NOT DETECTED
GLOBULIN UR ELPH-MCNC: 3 GM/DL
GLUCOSE SERPL-MCNC: 93 MG/DL (ref 65–99)
HCG SERPL QL: NEGATIVE
HCT VFR BLD AUTO: 43 % (ref 34–46.6)
HGB BLD-MCNC: 15 G/DL (ref 12–15.9)
HOLD SPECIMEN: NORMAL
HOLD SPECIMEN: NORMAL
IMM GRANULOCYTES # BLD AUTO: 0.03 10*3/MM3 (ref 0–0.05)
IMM GRANULOCYTES NFR BLD AUTO: 0.3 % (ref 0–0.5)
LYMPHOCYTES # BLD AUTO: 2.13 10*3/MM3 (ref 0.7–3.1)
LYMPHOCYTES NFR BLD AUTO: 21.8 % (ref 19.6–45.3)
MAGNESIUM SERPL-MCNC: 1.9 MG/DL (ref 1.6–2.6)
MCH RBC QN AUTO: 30.6 PG (ref 26.6–33)
MCHC RBC AUTO-ENTMCNC: 34.9 G/DL (ref 31.5–35.7)
MCV RBC AUTO: 87.8 FL (ref 79–97)
MONOCYTES # BLD AUTO: 0.59 10*3/MM3 (ref 0.1–0.9)
MONOCYTES NFR BLD AUTO: 6 % (ref 5–12)
NEUTROPHILS NFR BLD AUTO: 6.84 10*3/MM3 (ref 1.7–7)
NEUTROPHILS NFR BLD AUTO: 70.2 % (ref 42.7–76)
NRBC BLD AUTO-RTO: 0 /100 WBC (ref 0–0.2)
NT-PROBNP SERPL-MCNC: <36 PG/ML (ref 0–450)
PLATELET # BLD AUTO: 268 10*3/MM3 (ref 140–450)
PMV BLD AUTO: 9.9 FL (ref 6–12)
POTASSIUM SERPL-SCNC: 3.9 MMOL/L (ref 3.5–5.2)
PROT SERPL-MCNC: 8 G/DL (ref 6–8.5)
RBC # BLD AUTO: 4.9 10*6/MM3 (ref 3.77–5.28)
SARS-COV-2 RNA RESP QL NAA+PROBE: NOT DETECTED
SODIUM SERPL-SCNC: 139 MMOL/L (ref 136–145)
TROPONIN T SERPL HS-MCNC: <6 NG/L
WBC NRBC COR # BLD AUTO: 9.76 10*3/MM3 (ref 3.4–10.8)
WHOLE BLOOD HOLD COAG: NORMAL
WHOLE BLOOD HOLD SPECIMEN: NORMAL

## 2023-12-04 PROCEDURE — 85025 COMPLETE CBC W/AUTO DIFF WBC: CPT | Performed by: STUDENT IN AN ORGANIZED HEALTH CARE EDUCATION/TRAINING PROGRAM

## 2023-12-04 PROCEDURE — 84484 ASSAY OF TROPONIN QUANT: CPT | Performed by: STUDENT IN AN ORGANIZED HEALTH CARE EDUCATION/TRAINING PROGRAM

## 2023-12-04 PROCEDURE — 83880 ASSAY OF NATRIURETIC PEPTIDE: CPT | Performed by: STUDENT IN AN ORGANIZED HEALTH CARE EDUCATION/TRAINING PROGRAM

## 2023-12-04 PROCEDURE — 80053 COMPREHEN METABOLIC PANEL: CPT | Performed by: STUDENT IN AN ORGANIZED HEALTH CARE EDUCATION/TRAINING PROGRAM

## 2023-12-04 PROCEDURE — 93005 ELECTROCARDIOGRAM TRACING: CPT

## 2023-12-04 PROCEDURE — 85379 FIBRIN DEGRADATION QUANT: CPT | Performed by: STUDENT IN AN ORGANIZED HEALTH CARE EDUCATION/TRAINING PROGRAM

## 2023-12-04 PROCEDURE — 93005 ELECTROCARDIOGRAM TRACING: CPT | Performed by: STUDENT IN AN ORGANIZED HEALTH CARE EDUCATION/TRAINING PROGRAM

## 2023-12-04 PROCEDURE — 71045 X-RAY EXAM CHEST 1 VIEW: CPT

## 2023-12-04 PROCEDURE — 25810000003 LACTATED RINGERS SOLUTION: Performed by: STUDENT IN AN ORGANIZED HEALTH CARE EDUCATION/TRAINING PROGRAM

## 2023-12-04 PROCEDURE — 87636 SARSCOV2 & INF A&B AMP PRB: CPT | Performed by: STUDENT IN AN ORGANIZED HEALTH CARE EDUCATION/TRAINING PROGRAM

## 2023-12-04 PROCEDURE — 83735 ASSAY OF MAGNESIUM: CPT | Performed by: STUDENT IN AN ORGANIZED HEALTH CARE EDUCATION/TRAINING PROGRAM

## 2023-12-04 PROCEDURE — 99284 EMERGENCY DEPT VISIT MOD MDM: CPT

## 2023-12-04 PROCEDURE — 84703 CHORIONIC GONADOTROPIN ASSAY: CPT | Performed by: STUDENT IN AN ORGANIZED HEALTH CARE EDUCATION/TRAINING PROGRAM

## 2023-12-04 PROCEDURE — 36415 COLL VENOUS BLD VENIPUNCTURE: CPT

## 2023-12-04 RX ORDER — SODIUM CHLORIDE 0.9 % (FLUSH) 0.9 %
10 SYRINGE (ML) INJECTION AS NEEDED
Status: DISCONTINUED | OUTPATIENT
Start: 2023-12-04 | End: 2023-12-04 | Stop reason: HOSPADM

## 2023-12-04 RX ADMIN — SODIUM CHLORIDE, POTASSIUM CHLORIDE, SODIUM LACTATE AND CALCIUM CHLORIDE 1000 ML: 600; 310; 30; 20 INJECTION, SOLUTION INTRAVENOUS at 15:15

## 2023-12-04 NOTE — ED PROVIDER NOTES
Subjective   History of Present Illness  Patient presents due to persistent tachycardia and tachypnea.  Present for 4 days.  History of POTS.  She has never had her heart rate sustained this high for this long before.  She has been dyspneic on exertion complains of a headache in the top of her head.  When asked for further description of the headache, she says it feels like a burning and points to the top bridge of her nose and states that it feels like what happens when he excellently inhaled water.  No syncope.  Lightheaded on exertion.  No chest pain.  No new or changed abdominal pain nausea or vomiting; history of gastroparesis.  Normal bowel movements.  No fevers, no cold chills, no cough congestion or sore throat.  Denies a history of blood clot.  No pain or swelling her legs.  Does not take estrogen.  Self administered 3 L of LR at home on Saturday without improvement.    On review of systems, asking about numbness or tingling she says sometimes her left arm gets numb whenever she feels unwell and this is present currently.  No weakness.  No speech deficit or facial droop.    Review of Systems   Constitutional:  Negative for chills and fever.   Respiratory:  Positive for shortness of breath. Negative for cough.    Cardiovascular:  Negative for chest pain and palpitations.   Gastrointestinal:  Negative for abdominal pain and vomiting.   Genitourinary:  Negative for difficulty urinating and dysuria.   Neurological:  Negative for syncope and light-headedness.       Past Medical History:   Diagnosis Date    Anemia     Depression     Dyspepsia     Samantha-Danlos syndrome     august 2018    Gastroparesis     GERD (gastroesophageal reflux disease)     IBS (irritable bowel syndrome)     Migraine     PONV (postoperative nausea and vomiting)     Postural orthostatic tachycardia syndrome     Scoliosis deformity of spine 06/2017       Allergies   Allergen Reactions    Adhesive Tape Rash    Compazine [Prochlorperazine  Edisylate] Rash    Percocet [Oxycodone-Acetaminophen] Rash    Scopolamine Rash       Past Surgical History:   Procedure Laterality Date     SECTION      CHOLECYSTECTOMY      COLONOSCOPY N/A 2020    Procedure: COLONOSCOPY WITH ANESTHESIA;  Surgeon: Ronaldo Cole DO;  Location: [facility] ENDOSCOPY;  Service: Gastroenterology;  Laterality: N/A;  preop; altered bowel  postop; normal   PCP Ju Garibay,     CYSTOSCOPY N/A 2023    Procedure: CYSTOSCOPY;  Surgeon: Chun Dinero MD;  Location: [facility] OR;  Service: Obstetrics/Gynecology;  Laterality: N/A;    D & C HYSTEROSCOPY N/A 2023    Procedure: DILATATION AND CURETTAGE HYSTEROSCOPY;  Surgeon: Chun Dinero MD;  Location: [facility] OR;  Service: Obstetrics/Gynecology;  Laterality: N/A;    DIAGNOSTIC LAPAROSCOPY N/A 2023    Procedure: DIAGNOSTIC LAPAROSCOPY WITH LYSIS OF ADHESION;  Surgeon: Chun Dinero MD;  Location: [facility] OR;  Service: Obstetrics/Gynecology;  Laterality: N/A;    ENDOSCOPY  2016    ENDOSCOPY N/A 2017    Procedure: ESOPHAGOGASTRODUODENOSCOPY possible dilation ;  Surgeon: Franky Berman MD;  Location: [facility] ENDOSCOPY;  Service:     ENDOSCOPY N/A 2022    Procedure: ESOPHAGOGASTRODUODENOSCOPY WITH ANESTHESIA;  Surgeon: Ronaldo Cole DO;  Location: [facility] ENDOSCOPY;  Service: Gastroenterology;  Laterality: N/A;  pre gerd  post normal  Ju Sandhu    TONSILLECTOMY      VENOUS ACCESS DEVICE (PORT) INSERTION N/A 2022    Procedure: INSERTION VENOUS ACCESS DEVICE (PORT-A-CATH);  Surgeon: Jordan Baez MD;  Location: [facility] HYBRID OR 12;  Service: Vascular;  Laterality: N/A;       Family History   Problem Relation Age of Onset    Colon polyps Mother     Breast cancer Mother 49    Hypertension Mother     Hypertension Father     Arthritis Father     Hyperlipidemia Father     Heart disease Father     Migraines Father     No Known Problems Sister     No Known Problems Sister      No Known Problems Sister     No Known Problems Brother     Diabetes Paternal Grandfather     Hypertension Paternal Grandfather     Seizures Nephew     Colon cancer Neg Hx     Esophageal cancer Neg Hx     Stroke Neg Hx     Ovarian cancer Neg Hx     Endometrial cancer Neg Hx     Uterine cancer Neg Hx     Melanoma Neg Hx        Social History     Socioeconomic History    Marital status:    Tobacco Use    Smoking status: Never    Smokeless tobacco: Never   Vaping Use    Vaping Use: Never used   Substance and Sexual Activity    Alcohol use: No    Drug use: No    Sexual activity: Yes     Partners: Male     Birth control/protection: Vasectomy           Objective   Physical Exam  Vitals reviewed.   Constitutional:       General: She is not in acute distress.  HENT:      Head: Normocephalic and atraumatic.   Eyes:      Extraocular Movements: Extraocular movements intact.      Conjunctiva/sclera: Conjunctivae normal.   Cardiovascular:      Pulses: Normal pulses.      Heart sounds: Normal heart sounds.   Pulmonary:      Effort: Pulmonary effort is normal. No respiratory distress.      Breath sounds: No decreased breath sounds or wheezing.   Abdominal:      General: Abdomen is flat. There is no distension.      Tenderness: There is no abdominal tenderness. There is no guarding.   Musculoskeletal:      Cervical back: Normal range of motion and neck supple.      Right lower leg: No tenderness. No edema.      Left lower leg: No tenderness. No edema.   Skin:     General: Skin is warm and dry.   Neurological:      General: No focal deficit present.      Mental Status: She is alert. Mental status is at baseline.      Comments: Right upper extremity: 5/5 strength with handgrip and flexion/extension of shoulders, elbows.   Light touch sensation intact and equal when compared to the left upper extremity.    Left upper extremity: 5/5 strength with handgrip and flexion/extension of shoulders, elbows.   Light touch sensation  decreased when compared to the right upper extremity.    Right lower extremity: 5/5 strength with flexion/extension of hips, knees, and dorsi/plantarflexion of ankles. Able to wiggle toes.   Light touch sensation intact and equal when compared to the left lower extremity.    Left lower extremity: 5/5 strength with flexion/extension of hips, knees, and dorsi/plantarflexion of ankles. Able to wiggle toes.   Light touch sensation intact and equal when compared to the right lower extremity.    Light sensation intact in bilateral face. CN 2-12 normal.    Psychiatric:         Behavior: Behavior normal.         Thought Content: Thought content normal.         Procedures           ED Course  ED Course as of 12/04/23 1619   Mon Dec 04, 2023   1602  [AS]      ED Course User Index  [AS] Agustín Montilla MD                                             Medical Decision Making  Amount and/or Complexity of Data Reviewed  Labs: ordered.  ECG/medicine tests: ordered.    Risk  Prescription drug management.      Nai Golden is a 27 y.o. female with PMH above who presents to the Emergency Department with tachycardia. Differential diagnosis includes inappropriate sinus tachycardia, POTS, DVT/PE, less likely ACS.  She endorses that metoprolol dropped her blood pressure and it seems like she would prefer to avoid beta-blockers for now.  Will trial IV fluids while conducting workup.  Also consider electrolyte disturbance or tachyarrhythmia; plan to review EKG.     ED Course:   -EKG shows sinus tachycardia, no other arrhythmia, no focal ischemic changes, no prolonged QTc or prolonged intervals.  -Laboratory studies unremarkable.  Low Wells score so dimer is appropriate.  Patient on improvement in her tachycardia with 1 L of fluids.  On my final assessment she was 109 heart rate.  Likely due to her POTS.  Offered admission for IV fluids, further observation in the ER, and consideration of any further testing.  I  did forget to get her COVID swabs ordered that at the end and she can follow-up with that in her [facility] dez.  She had no further concerns.  Would like to be discharged.      Final diagnosis: Tachycardia    All questions answered. Patient/family was understanding and in agreement with today's assessment and plan. The patient was monitored during their stay in the ED and dispositioned without acute event.    Electronically signed by:  Agustín Montilla MD 12/4/2023 16:19 CST      Note: Dragon medical dictation software was used in the creation of this note.      Final diagnoses:   Tachycardia       ED Disposition  ED Disposition       ED Disposition   Discharge    Condition   Stable    Comment   --               Ju Garibay, APRN  [address] [place] [address] [phone]               Medication List        Changed      cyanocobalamin 1000 MCG/ML injection  Inject 1 mL into the appropriate muscle as directed by prescriber Every 14 (Fourteen) Days.  What changed: additional instructions                 Agustín Montilla MD  12/04/23 6030

## 2023-12-04 NOTE — DISCHARGE INSTRUCTIONS
Please follow-up with your COVID and flu test in your [facility] dez.  You can use fluids at home as needed.  Follow-up with your doctor regarding her tachycardia.  Please come back if her tachycardia causes significant lightheadedness, shortness of breath, passing out, or any other emergencies.

## 2023-12-05 LAB
QT INTERVAL: 296 MS
QTC INTERVAL: 437 MS

## 2024-01-11 ENCOUNTER — LAB (OUTPATIENT)
Dept: LAB | Facility: HOSPITAL | Age: 29
End: 2024-01-11
Payer: COMMERCIAL

## 2024-01-11 ENCOUNTER — TRANSCRIBE ORDERS (OUTPATIENT)
Dept: ADMINISTRATIVE | Facility: HOSPITAL | Age: 29
End: 2024-01-11
Payer: COMMERCIAL

## 2024-01-11 DIAGNOSIS — G90.A POTS (POSTURAL ORTHOSTATIC TACHYCARDIA SYNDROME): ICD-10-CM

## 2024-01-11 DIAGNOSIS — Z01.818 PRE-OP EVALUATION: Primary | ICD-10-CM

## 2024-01-11 DIAGNOSIS — Z01.818 PRE-OP EVALUATION: ICD-10-CM

## 2024-01-11 LAB
ALBUMIN SERPL-MCNC: 4.6 G/DL (ref 3.5–5.2)
ALBUMIN/GLOB SERPL: 1.9 G/DL
ALP SERPL-CCNC: 100 U/L (ref 39–117)
ALT SERPL W P-5'-P-CCNC: 15 U/L (ref 1–33)
ANION GAP SERPL CALCULATED.3IONS-SCNC: 10 MMOL/L (ref 5–15)
APTT PPP: 27.8 SECONDS (ref 24.5–36)
AST SERPL-CCNC: 14 U/L (ref 1–32)
BILIRUB SERPL-MCNC: <0.2 MG/DL (ref 0–1.2)
BUN SERPL-MCNC: 8 MG/DL (ref 6–20)
BUN/CREAT SERPL: 7.2 (ref 7–25)
CALCIUM SPEC-SCNC: 9.5 MG/DL (ref 8.6–10.5)
CHLORIDE SERPL-SCNC: 102 MMOL/L (ref 98–107)
CO2 SERPL-SCNC: 28 MMOL/L (ref 22–29)
CREAT SERPL-MCNC: 1.11 MG/DL (ref 0.57–1)
DEPRECATED RDW RBC AUTO: 37 FL (ref 37–54)
EGFRCR SERPLBLD CKD-EPI 2021: 70.4 ML/MIN/1.73
ERYTHROCYTE [DISTWIDTH] IN BLOOD BY AUTOMATED COUNT: 11.6 % (ref 12.3–15.4)
GLOBULIN UR ELPH-MCNC: 2.4 GM/DL
GLUCOSE SERPL-MCNC: 100 MG/DL (ref 65–99)
HCT VFR BLD AUTO: 35.8 % (ref 34–46.6)
HGB BLD-MCNC: 12.3 G/DL (ref 12–15.9)
INR PPP: 0.91 (ref 0.91–1.09)
MCH RBC QN AUTO: 30.6 PG (ref 26.6–33)
MCHC RBC AUTO-ENTMCNC: 34.4 G/DL (ref 31.5–35.7)
MCV RBC AUTO: 89.1 FL (ref 79–97)
PLATELET # BLD AUTO: 242 10*3/MM3 (ref 140–450)
PMV BLD AUTO: 10 FL (ref 6–12)
POTASSIUM SERPL-SCNC: 3.8 MMOL/L (ref 3.5–5.2)
PROT SERPL-MCNC: 7 G/DL (ref 6–8.5)
PROTHROMBIN TIME: 12.3 SECONDS (ref 11.8–14.8)
RBC # BLD AUTO: 4.02 10*6/MM3 (ref 3.77–5.28)
SODIUM SERPL-SCNC: 140 MMOL/L (ref 136–145)
WBC NRBC COR # BLD AUTO: 9.18 10*3/MM3 (ref 3.4–10.8)

## 2024-01-11 PROCEDURE — 85610 PROTHROMBIN TIME: CPT

## 2024-01-11 PROCEDURE — 36415 COLL VENOUS BLD VENIPUNCTURE: CPT

## 2024-01-11 PROCEDURE — 80053 COMPREHEN METABOLIC PANEL: CPT

## 2024-01-11 PROCEDURE — 85730 THROMBOPLASTIN TIME PARTIAL: CPT

## 2024-01-11 PROCEDURE — 85027 COMPLETE CBC AUTOMATED: CPT

## 2024-11-01 NOTE — MR AVS SNAPSHOT
Nai Gold   1/20/2017 9:00 AM   Office Visit    Dept Phone:  [phone]   Encounter #:  [id number]    Provider:  Beth Avila MD   Department:  [facility]                Your Full Care Plan              Today's Medication Changes          These changes are accurate as of: 1/20/17  9:18 AM.  If you have any questions, ask your nurse or doctor.               Medication(s)that have changed:     etonogestrel-ethinyl estradiol 0.12-0.015 MG/24HR vaginal ring   Commonly known as:  NUVARING   Insert 1 each into the vagina Every 28 (Twenty-Eight) Days.   What changed:  See the new instructions.   Changed by:  Beth Avila MD            Where to Get Your Medications      These medications were sent to [address] [phone] [facility] [phone] [address] [address]     Phone:  [phone]     etonogestrel-ethinyl estradiol 0.12-0.015 MG/24HR vaginal ring                  Your Updated Medication List          This list is accurate as of: 1/20/17  9:18 AM.  Always use your most recent med list.                etonogestrel-ethinyl estradiol 0.12-0.015 MG/24HR vaginal ring   Commonly known as:  NUVARING   Insert 1 each into the vagina Every 28 (Twenty-Eight) Days.       hyoscyamine 0.125 MG tablet   Commonly known as:  ANASPAZ,LEVSIN   Take 1 tablet by mouth Every 4 (Four) Hours As Needed for cramping or diarrhea.       metoprolol succinate XL 25 MG 24 hr tablet   Commonly known as:  TOPROL XL   Take 1 tablet by mouth Daily.       ondansetron 4 MG tablet   Commonly known as:  ZOFRAN   Take 1 tablet by mouth Every 8 (Eight) Hours.       raNITIdine 150 MG tablet   Commonly known as:  ZANTAC       ZOLOFT 50 MG tablet   Generic drug:  sertraline               We Performed the Following     PTH, Intact       You Were Diagnosed With        Codes Comments    Chronic  "fatigue    -  Primary ICD-10-CM: R53.82  ICD-9-CM: 780.79     Sinus tachycardia     ICD-10-CM: R00.0  ICD-9-CM: 427.89     Irritable bowel syndrome with both constipation and diarrhea     ICD-10-CM: K58.2  ICD-9-CM: 564.1       Medications to be Given to You by a Medical Professional     Due       Frequency    (none) cyanocobalamin injection 1,000 mcg  Every 28 Days      Instructions    Vitamin B12 Deficiency  Not having enough vitamin B12 is called a deficiency. Vitamin B12 is an important vitamin. Your body needs vitamin B12 to:   · Make red blood cells.  · Make DNA. This is the genetic material inside all of your cells.  · Help your nerves work properly so they can carry messages from your brain to your body.  CAUSES  · Not eating enough foods that contain vitamin B12.  · Not having enough stomach acid and digestive juices. The body needs these to absorb vitamin B12 from the food you eat.  · Having certain digestive system diseases that make it hard to absorb vitamin B12. These diseases include Crohn's disease, chronic pancreatitis, and cystic fibrosis.  · Having pernicious anemia, which is a condition where the body has too few red blood cells. People with this condition do not make enough of a protein called \"intrinsic factor,\" which is needed to absorb vitamin B12.  · Having a surgery in which part of the stomach or small intestine is removed.  · Taking certain medicines that make it hard for the body to absorb vitamin B12. These medicines include:    Heartburn medicine (antacids and proton pump inhibitors).    A certain antibiotic medicine called neomycin, which fights infection.    Some medicines used to treat diabetes, tuberculosis, gout, and high cholesterol.  RISK FACTORS  Risk factors are things that make you more likely to develop a vitamin B12 deficiency. They include:  · Being older than 50.  · Being a vegetarian.  · Being pregnant and a vegetarian or having a poor diet.  · Taking certain " drugs.  · Being an alcoholic.  SYMPTOMS  You may have a vitamin B12 deficiency with no symptoms. However, a vitamin B12 deficiency can cause health problems like anemia and nerve damage. These health problems can lead to many possible symptoms, including:  · Weakness.  · Fatigue.  · Loss of appetite.  · Weight loss.  · Numbness or tingling in your hands and feet.  · Redness and burning of the tongue.  · Confusion or memory problems.  · Depression.  · Dizziness.  · Sensory problems, such as loss of taste, color blindness, and ringing in the ears.  · Diarrhea or constipation.  · Trouble walking.  DIAGNOSIS  Various types of tests can be given to help find the cause of your vitamin B12 deficiency. These tests include:  · A complete blood count (CBC). This test gives your caregiver an overall picture of what makes up your blood.  · A blood test to measure your B12 level.  · A blood test to measure intrinsic factor.  · An endoscopy. This procedure uses a thin tube with a camera on the end to look into your stomach or intestines.  TREATMENT  Treatment for vitamin B12 deficiency depends on what is causing it. Common options include:  · Changing your eating and drinking habits, such as:    Eating more foods that contain vitamin B12.    Not drinking as much alcohol or any alcohol.  · Taking vitamin B12 supplements. Your caregiver will tell you what dose is best for you.  · Getting vitamin B12 injections. Some people get these a few times a week. Others get them once a month.  HOME CARE INSTRUCTIONS  · Take all supplements as directed by your caregiver. Follow the directions carefully.  · Get any injections your caregiver prescribes. Do not miss your appointments.  · Eat lots of healthy foods that contain vitamin B12. Ask your caregiver if you should work with a nutritionist. Good things to include in your diet are:    Meat.    Poultry.    Fish.    Eggs.    Fortified cereal and dairy products. This means vitamin B12 has been  "added to the food. Check the label on the package to be sure.  · Do not abuse alcohol.  · Keep all follow-up appointments. Your caregiver will need to perform blood tests to make sure your vitamin B12 deficiency is going away.  SEEK MEDICAL CARE IF:  · You have any questions about your treatment.  · Your symptoms come back.  MAKE SURE YOU:  · Understand these instructions.  · Will watch your condition.  · Will get help right away if you are not doing well or get worse.     This information is not intended to replace advice given to you by your health care provider. Make sure you discuss any questions you have with your health care provider.     Document Released: 03/11/2013 Document Reviewed: 05/04/2016  [employer] Interactive Patient Education ©2016 [employer]       Patient Instructions History      Upcoming Appointments     Visit Type Date Time Department    OFFICE VISIT 1/20/2017  9:00 AM [facility] Signup     Our records indicate that you have an active [employer] account.    You can view your After Visit Summary by going to girnarsoft and logging in with your Myrl username and password.  If you don't have a Myrl username and password but a parent or guardian has access to your record, the parent or guardian should login with their own Myrl username and password and access your record to view the After Visit Summary.    If you have questions, you can email [email or web address] or call [phone] to talk to our Myrl staff.  Remember, Myrl is NOT to be used for urgent needs.  For medical emergencies, dial 911.               Other Info from Your Visit           Allergies     Percocet [Oxycodone-acetaminophen]        Reason for Visit     Irritable Bowel Syndrome 4 week f/u    Med Refill nuva ring      Vital Signs     Blood Pressure Pulse Temperature Height Weight Last Menstrual Period    118/74 (85 %/ 84 %)* 116 98.9 °F (37.2 °C) 59\" (149.9 cm) " [FreeTextEntry1] : Rheumatology Consultation Note   Chief Complaint: Positive RF and elevated inflammatory markers    History of Present Illness: YEIMY MILES is a 40 year old woman PMH basal cell CA s/p MOH's 2015, depression and anxiety, going to  who presents with elevated RF and inflammatory markers.   Feeling not herself recently, very fatigued, this past July broke into a full body rash. Both eyelids had rashes, been there for 3 weeks. Not triggered by sun-exposure. No photo-sensitivity. Body rash has resolved.   Fatigue worse over the past few months, nothing inciting it.   Has pain in from elbow to wrist. No swelling there. No stiffness. Pain is intermittent. Tyleonl as needed helps. No swelling in the hands.   Hips down to the feet feel stiff. No knee swelling. Stiffness with overexertion gets worse, initially gets better. In the morning though has stiffness but gets better with activity. At rest, no stiffness. Just tylenol and tiger elizabeth as needed for pain, helps a little.   Ankle swelling in the left foot comes and goes, started after the flouroquinole exposure.   Been getting lower back and neck pain, but pain doesn't bother her. Currently works as an  using hands and walking all the time.   Hasn't tolerated steroids anymore, got in the past for bronchitis she says, get pain all over body, maybe from prednisone?   Used to get sinus infections as a child, not in 30's. No ear infections. Sensation is intact. No pitting in the nails.   Naproxen, advil, meloxicam, aspirin make her tendon's hurt. Hasn't tired voltaren gel or topicals b/c scared it might cause her tendons to hurt.   PAP smears been normal  Colonoscopy in August 2024 normal   Family hx: Aunt has polymyositis she's on therapy   Inflammatory arthritis ROS negative for symmetrical peripheral joint synovitis, dactylitis, psoriasis/ rashes, eye inflammation, inflammatory low back pain, IBD. SLE ROS negative for oral ulcers, facial rash, chest pain, abdominal pain, hair loss, Raynaud's, joint swelling, unexplained numbness or weakness, seizures, frothy urine or hematuria. APLS ROS negative for uncomplicated pregnancies, no miscarriage, no thrombotic events.        (2 %, Z= -2.06)† 111 lb (50.3 kg) (19 %, Z= -0.89)† 01/09/2017    Body Mass Index Smoking Status                22.42 kg/m2 (60 %, Z= 0.25)† Never Smoker        *BP percentiles are based on NHBPEP's 4th Report    †Growth percentiles are based on [facility] 2-20 Years data.      Problems and Diagnoses Noted     Tiredness    Irritable bowel syndrome with both constipation and diarrhea    Sinus tachycardia

## 2024-11-20 ENCOUNTER — OFFICE VISIT (OUTPATIENT)
Age: 29
End: 2024-11-20
Payer: COMMERCIAL

## 2024-11-20 VITALS
DIASTOLIC BLOOD PRESSURE: 90 MMHG | SYSTOLIC BLOOD PRESSURE: 128 MMHG | HEIGHT: 60 IN | WEIGHT: 133 LBS | BODY MASS INDEX: 26.11 KG/M2

## 2024-11-20 DIAGNOSIS — N93.9 ABNORMAL UTERINE BLEEDING (AUB): Primary | ICD-10-CM

## 2024-11-20 NOTE — PROGRESS NOTES
"Subjective     Nai Golden is a 28 y.o. female    History of Present Illness  Arrived for a gyne visit with an ultrasound for dysfunctional or abnormal uterine bleeding. She has been experiencing irregular bleeding x 4 months. Usually random spotting but last month had one week of bleeding, followed by one week without bleeding. She then began bleeding and has not stopped since 10/31/2024. Describes the bleeding as very light.          /90 (BP Location: Left arm, Patient Position: Sitting)   Ht 152.4 cm (60\")   Wt 60.3 kg (133 lb)   LMP 10/31/2024 (Exact Date)   BMI 25.97 kg/m²     Outpatient Encounter Medications as of 11/20/2024   Medication Sig Dispense Refill    buPROPion SR (Wellbutrin SR) 150 MG 12 hr tablet Take 1 tablet by mouth 2 (Two) Times a Day. 180 tablet 2    cyanocobalamin 1000 MCG/ML injection Inject 1 mL into the appropriate muscle as directed by prescriber Every 14 (Fourteen) Days. (Patient taking differently: Inject 1 mL into the appropriate muscle as directed by prescriber Every 14 (Fourteen) Days. PT STATES SHE IS NOW TAKING ONCE A MONTH) 2 mL 8    cyanocobalamin 1000 MCG/ML injection Inject 1 mL under the skin into the appropriate area as directed Every 30 (Thirty) Days. 4 mL 5    famotidine (Pepcid) 40 MG tablet Take 1 tablet by mouth 2 (Two) Times a Day. 180 tablet 3    ivabradine HCl (Corlanor) 5 MG tablet tablet Take 1 tablet by mouth 2 (Two) Times a Day With Meals. 60 tablet 3    ivabradine HCl (Corlanor) 7.5 MG tablet tablet Take 1 tablet by mouth 2 (Two) Times a Day with meals. 180 tablet 3    NON FORMULARY 10 mg 4 (Four) Times a Day. Domperidone      ondansetron (ZOFRAN) 8 MG tablet Take 1 tablet by mouth Every 6 (Six) Hours to 8 (Eight) As Needed. 60 tablet 2    ondansetron (ZOFRAN) 8 MG tablet Take 1 tablet by mouth Every 6 to 8 Hours As Needed. 360 tablet 2    ondansetron ODT (ZOFRAN-ODT) 4 MG disintegrating tablet Place 1 tablet on the tongue Every 8 (Eight) " Hours As Needed for Nausea or Vomiting. 30 tablet 0    [DISCONTINUED] armodafinil (Nuvigil) 150 MG tablet Take 1 tablet by mouth Daily. 30 tablet 2    [DISCONTINUED] betamethasone valerate (VALISONE) 0.1 % ointment 1 application Daily.      [DISCONTINUED] Brexpiprazole (Rexulti) 0.5 MG tablet Take 0.5 mg by mouth Daily for 7 days 7 tablet 0    [DISCONTINUED] Brexpiprazole (Rexulti) 1 MG tablet Take 1/2 tablet by mouth for 7 days, then increase to 1 tablet by mouth Daily 30 tablet 3    [DISCONTINUED] brompheniramine-pseudoephedrine-DM 30-2-10 MG/5ML syrup Take 10 mL by mouth 4 (Four) Times a Day As Needed for Congestion or Cough. 240 mL 0    [DISCONTINUED] buPROPion XL (WELLBUTRIN XL) 300 MG 24 hr tablet Take 1 tablet by mouth Every Morning. 30 tablet 5    [DISCONTINUED] desvenlafaxine (PRISTIQ) 100 MG 24 hr tablet Take 1 tablet by mouth Daily. 30 tablet 3    [DISCONTINUED] desvenlafaxine (PRISTIQ) 50 MG 24 hr tablet Take 1 tablet by mouth Daily. 30 tablet 3    [DISCONTINUED] DULoxetine (Cymbalta) 30 MG capsule Take 2 capsules by mouth Daily. 180 capsule 1    [DISCONTINUED] famotidine (PEPCID) 40 MG tablet Take 1 tablet by mouth 2 (Two) Times a Day.      [DISCONTINUED] Galcanezumab-gnlm (Emgality) 120 MG/ML solution prefilled syringe Inject 1 mL under the skin into the appropriate area as directed Every 30 (Thirty) Days. 1 mL 5    [DISCONTINUED] Galcanezumab-gnlm (Emgality) 120 MG/ML solution prefilled syringe Inject 1 mL under the skin into the appropriate area as directed Every 30 (Thirty) Days. 1 each 5    [DISCONTINUED] ibuprofen (ADVIL,MOTRIN) 600 MG tablet Take 1 tablet by mouth Every 6 (Six) Hours As Needed for Moderate Pain or Mild Pain. 40 tablet 0    [DISCONTINUED] levocetirizine (XYZAL) 5 MG tablet Daily.      [DISCONTINUED] Lidocaine, Anorectal, (LC-5 Lidocaine) 5 % cream cream Apply Daily As Needed for port access. 45 g 1    [DISCONTINUED] lisdexamfetamine (Vyvanse) 20 MG capsule Take 1 capsule by mouth  Every Morning 30 capsule 0    [DISCONTINUED] lubiprostone (Amitiza) 24 MCG capsule Take 1 capsule by mouth 2 (Two) Times a Day with food and water 60 capsule 0    [DISCONTINUED] meclizine (ANTIVERT) 25 MG tablet Take 1 tablet by mouth Every 12 (Twelve) Hours. 60 tablet 2    [DISCONTINUED] meclizine (ANTIVERT) 25 MG tablet Take 1 tablet by mouth Every 12 (Twelve) Hours. 60 tablet 2    [DISCONTINUED] methylPREDNISolone (MEDROL) 4 MG dose pack Take as directed on package instructions. 21 tablet 0    [DISCONTINUED] pantoprazole (PROTONIX) 40 MG EC tablet Take 1 tablet by mouth Daily. 30 tablet 5    [DISCONTINUED] phentermine (ADIPEX-P) 37.5 MG tablet Take 1 tablet by mouth Daily. 30 tablet 0    [DISCONTINUED] Rimegepant Sulfate (Nurtec) 75 MG tablet dispersible tablet Place 1 tablet on the tongue and allow to dissolve, max of 1 tablet in 24 hours 16 tablet 5    [DISCONTINUED] tiZANidine (Zanaflex) 4 MG tablet Take 1 tablet by mouth three times daily as needed for muscle spasm 270 tablet 1    [DISCONTINUED] vilazodone (VIIBRYD) 10 MG tablet tablet Take 1 tablet by mouth Daily with food. 14 tablet 0    [DISCONTINUED] vilazodone (VIIBRYD) 20 MG tablet tablet Take 1 tablet by mouth Daily with food after completing 2 weeks of 10mg 30 tablet 3    [DISCONTINUED] vitamin D (ERGOCALCIFEROL) 1.25 MG (75425 UT) capsule capsule Take 1 capsule by mouth 1 (One) Time Per Week. 5 capsule 3     No facility-administered encounter medications on file as of 2024.       Surgical History  Past Surgical History:   Procedure Laterality Date     SECTION      CHOLECYSTECTOMY      COLONOSCOPY N/A 2020    Procedure: COLONOSCOPY WITH ANESTHESIA;  Surgeon: Ronaldo Cole DO;  Location: [facility] ENDOSCOPY;  Service: Gastroenterology;  Laterality: N/A;  preop; altered bowel  postop; normal   PCP Ju Garibay,     CYSTOSCOPY N/A 2023    Procedure: CYSTOSCOPY;  Surgeon: Chun Dinero MD;  Location: [facility] OR;   Service: Obstetrics/Gynecology;  Laterality: N/A;    D & C HYSTEROSCOPY N/A 5/22/2023    Procedure: DILATATION AND CURETTAGE HYSTEROSCOPY;  Surgeon: Chun Dinero MD;  Location: [facility] OR;  Service: Obstetrics/Gynecology;  Laterality: N/A;    DIAGNOSTIC LAPAROSCOPY N/A 5/22/2023    Procedure: DIAGNOSTIC LAPAROSCOPY WITH LYSIS OF ADHESION;  Surgeon: Chun Dinero MD;  Location: [facility] OR;  Service: Obstetrics/Gynecology;  Laterality: N/A;    ENDOSCOPY  04/08/2016    ENDOSCOPY N/A 08/02/2017    Procedure: ESOPHAGOGASTRODUODENOSCOPY possible dilation ;  Surgeon: Franky Berman MD;  Location: [facility] ENDOSCOPY;  Service:     ENDOSCOPY N/A 11/28/2022    Procedure: ESOPHAGOGASTRODUODENOSCOPY WITH ANESTHESIA;  Surgeon: Ronaldo Cole DO;  Location: [facility] ENDOSCOPY;  Service: Gastroenterology;  Laterality: N/A;  pre gerd  post normal  Ju Schdler    TONSILLECTOMY      VENOUS ACCESS DEVICE (PORT) INSERTION N/A 08/25/2022    Procedure: INSERTION VENOUS ACCESS DEVICE (PORT-A-CATH);  Surgeon: Jordan Baez MD;  Location: [facility] HYBRID OR 12;  Service: Vascular;  Laterality: N/A;       Family History  Family History   Problem Relation Age of Onset    Colon polyps Mother     Breast cancer Mother 49    Hypertension Mother     Hypertension Father     Arthritis Father     Hyperlipidemia Father     Heart disease Father     Migraines Father     No Known Problems Sister     No Known Problems Sister     No Known Problems Sister     No Known Problems Brother     Diabetes Paternal Grandfather     Hypertension Paternal Grandfather     Seizures Nephew     Colon cancer Neg Hx     Esophageal cancer Neg Hx     Stroke Neg Hx     Ovarian cancer Neg Hx     Endometrial cancer Neg Hx     Uterine cancer Neg Hx     Melanoma Neg Hx        The following portions of the patient's history were reviewed and updated as appropriate: allergies, current medications, past family history, past medical history, past social history, past  surgical history, and problem list.    Review of Systems   Constitutional:  Negative for fatigue.   Respiratory:  Negative for shortness of breath.    Cardiovascular:  Negative for chest pain and leg swelling.   Gastrointestinal:  Negative for abdominal pain.   Endocrine: Negative for cold intolerance and heat intolerance.   Genitourinary:  Positive for menstrual problem and vaginal bleeding. Negative for difficulty urinating, dysuria, pelvic pain and vaginal discharge.   Musculoskeletal:  Negative for back pain.   Skin:  Negative for rash and wound.   Allergic/Immunologic: Negative for environmental allergies.   Neurological:  Negative for dizziness and headache.   Hematological:  Does not bruise/bleed easily.   Psychiatric/Behavioral:  Negative for self-injury, suicidal ideas and depressed mood. The patient is nervous/anxious.        Objective   Physical Exam  Vitals and nursing note reviewed. Exam conducted with a chaperone present.   Constitutional:       General: She is not in acute distress.     Appearance: Normal appearance. She is well-developed. She is not ill-appearing or diaphoretic.   HENT:      Head: Normocephalic and atraumatic.   Eyes:      General: Lids are normal. No scleral icterus.  Neck:      Trachea: Phonation normal.   Cardiovascular:      Rate and Rhythm: Normal rate and regular rhythm.      Heart sounds: Normal heart sounds. No murmur heard.  Pulmonary:      Effort: Pulmonary effort is normal. No accessory muscle usage or respiratory distress.      Breath sounds: Normal breath sounds.   Abdominal:      Palpations: Abdomen is soft.      Tenderness: There is no abdominal tenderness. There is no right CVA tenderness or left CVA tenderness.   Genitourinary:     General: Normal vulva.      Exam position: Lithotomy position.      Pubic Area: No rash or pubic lice.       Labia:         Right: No rash, tenderness or lesion.         Left: No rash, tenderness or lesion.       Vagina: No signs of injury  and foreign body. Vaginal discharge present. No erythema, tenderness, lesions or prolapsed vaginal walls.      Cervix: Cervical bleeding (scant) present. No discharge, friability, lesion or erythema.      Rectum: Normal.   Musculoskeletal:         General: No tenderness. Normal range of motion.      Cervical back: Normal range of motion and neck supple. No rigidity or tenderness. No pain with movement. Normal range of motion.      Right lower leg: No edema.      Left lower leg: No edema.   Lymphadenopathy:      Cervical: No cervical adenopathy.   Skin:     General: Skin is warm and dry.      Coloration: Skin is not cyanotic, jaundiced or pale.      Findings: No lesion or rash.   Neurological:      Mental Status: She is alert and oriented to person, place, and time.      Gait: Gait normal.   Psychiatric:         Attention and Perception: Attention normal.         Mood and Affect: Mood and affect normal.         Speech: Speech normal.         Behavior: Behavior is cooperative.         Ultrasound today: Anteverted uterus measures 5.8 x 4.0 x 3.2 cm with endometrial lining measuring 4.3 mm. Normal-appearing ovaries.         Chart reviewed for screenings  Last Pap Smear: 9/28/2022 NILM   Last Mammogram: Begin screening age 41. Family history of breast cancer in mother diagnosed at age 49.   Last Colonoscopy: Begin screening age 47. No family history of colon cancer noted.   Last Bone Density Scan: Begin screening by 5 years after the onset of menopause.          Assessment & Plan   Diagnoses and all orders for this visit:    1. Abnormal uterine bleeding (AUB) (Primary)  Discussed possible associations and pelvic examination today to rule out concerns associated with the cervix or vagina. No abnormalities visualized and culture obtained for small amount of discharge. Discussed options of use of hormone therapy to reset menses. Nextstellis samples provided. To observe changes in environment if bleeding reoccurs/continues.    -     NuSwab VG+ - Swab, Vagina         Return to the office in 6 weeks for a gyne visit with surveillance of abnormal uterine bleeding/irregular menses and as needed with concerns.        This note has been signed electronically.    Erin Ruano DNP, APRN, CNM, RNC-OB  11/20/2024

## 2024-11-22 LAB
A VAGINAE DNA VAG QL NAA+PROBE: NORMAL SCORE
BVAB2 DNA VAG QL NAA+PROBE: NORMAL SCORE
C ALBICANS DNA VAG QL NAA+PROBE: NEGATIVE
C GLABRATA DNA VAG QL NAA+PROBE: NEGATIVE
C TRACH DNA SPEC QL NAA+PROBE: NEGATIVE
MEGA1 DNA VAG QL NAA+PROBE: NORMAL SCORE
N GONORRHOEA DNA VAG QL NAA+PROBE: NEGATIVE
T VAGINALIS DNA VAG QL NAA+PROBE: NEGATIVE

## 2024-12-31 ENCOUNTER — TELEPHONE (OUTPATIENT)
Dept: OBSTETRICS AND GYNECOLOGY | Age: 29
End: 2024-12-31

## 2024-12-31 NOTE — TELEPHONE ENCOUNTER
Caller: Nai Golden    Relationship:  Self    Best call back number: [phone] (home)       PATIENT CALLED REQUESTING TO CANCEL SAME DAY APPT.    Did the patient call AFTER the start time of their scheduled appointment?  []YES  [x]NO    Was the patient's appointment rescheduled? []YES  [x]NO    Any additional information: PT IS SICK. WILL CB TO RESCHEDULE.

## 2025-05-23 ENCOUNTER — TRANSCRIBE ORDERS (OUTPATIENT)
Dept: ADMINISTRATIVE | Facility: HOSPITAL | Age: 30
End: 2025-05-23
Payer: COMMERCIAL

## 2025-05-23 DIAGNOSIS — R29.898 WEAKNESS OF BOTH LOWER EXTREMITIES: ICD-10-CM

## 2025-05-23 DIAGNOSIS — R25.1 TREMOR: Primary | ICD-10-CM

## 2025-05-23 DIAGNOSIS — G43.109 MIGRAINE WITH VISUAL AURA: ICD-10-CM

## 2025-05-23 DIAGNOSIS — R42 DIZZINESS: ICD-10-CM

## 2025-05-23 DIAGNOSIS — R29.818 POSITIVE ROMBERG TEST: ICD-10-CM

## (undated) DEVICE — TBG SMPL FLTR LINE NASL 02/C02 A/ BX/100

## (undated) DEVICE — CVR PROB GEN PURP W ISOSILK 6X48

## (undated) DEVICE — APPL CHLORAPREP HI/LITE 26ML ORNG

## (undated) DEVICE — THE CHANNEL CLEANING BRUSH IS A NYLON FLEXI BRUSH ATTACHED TO A FLEXIBLE PLASTIC SHEATH DESIGNED TO SAFELY REMOVE DEBRIS FROM FLEXIBLE ENDOSCOPES.

## (undated) DEVICE — PK LAP GYN 30

## (undated) DEVICE — YANKAUER,BULB TIP WITH VENT: Brand: ARGYLE

## (undated) DEVICE — GLV SURG DERMASSURE GRN LF PF 8.0

## (undated) DEVICE — GLV SURG SENSICARE W/ALOE PF LF 7.5 STRL

## (undated) DEVICE — Device: Brand: DEFENDO AIR/WATER/SUCTION AND BIOPSY VALVE

## (undated) DEVICE — ADHS SKIN PREMIERPRO EXOFIN TOPICAL HI/VISC .5ML

## (undated) DEVICE — CYSTO/BLADDER IRRIGATION SET, REGULATING CLAMP

## (undated) DEVICE — PROXIMATE RH ROTATING HEAD SKIN STAPLERS (35 WIDE) CONTAINS 35 STAINLESS STEEL STAPLES: Brand: PROXIMATE

## (undated) DEVICE — PAD MINOR UNIVERSAL: Brand: MEDLINE INDUSTRIES, INC.

## (undated) DEVICE — BITEBLOCK ENDO W/STRAP 60F A/ LF DISP

## (undated) DEVICE — SYR SLP TP 10ML DISP

## (undated) DEVICE — ANTIBACTERIAL UNDYED BRAIDED (POLYGLACTIN 910), SYNTHETIC ABSORBABLE SUTURE: Brand: COATED VICRYL

## (undated) DEVICE — SNAP KOVER: Brand: UNBRANDED

## (undated) DEVICE — CONMED SCOPE SAVER BITE BLOCK, 20X27 MM: Brand: SCOPE SAVER

## (undated) DEVICE — SINGLE-USE BIOPSY FORCEPS: Brand: RADIAL JAW 4

## (undated) DEVICE — ENDOPATH XCEL WITH OPTIVIEW TECHNOLOGY BLADELESS TROCARS WITH STABILITY SLEEVES: Brand: ENDOPATH XCEL OPTIVIEW

## (undated) DEVICE — BAPTIST TURNOVER KIT: Brand: MEDLINE INDUSTRIES, INC.

## (undated) DEVICE — SPNG GZ 2S 2X2 8PLY STRL PK/2

## (undated) DEVICE — SYR LUERLOK 20CC BX/50

## (undated) DEVICE — SYR PRECISIONGLIDE LL 5CC 20X1 1/2IN

## (undated) DEVICE — SENSR O2 OXIMAX FNGR A/ 18IN NONSTR

## (undated) DEVICE — PACK,SET UP,NO DRAPES: Brand: MEDLINE

## (undated) DEVICE — FRCP BX RADJAW4 NDL 2.8 240 STD OG

## (undated) DEVICE — INTENDED FOR TISSUE SEPARATION, AND OTHER PROCEDURES THAT REQUIRE A SHARP SURGICAL BLADE TO PUNCTURE OR CUT.: Brand: BARD-PARKER ® STAINLESS STEEL BLADES

## (undated) DEVICE — 3M™ STERI-STRIP™ REINFORCED ADHESIVE SKIN CLOSURES, R1547, 1/2 IN X 4 IN (12 MM X 100 MM), 6 STRIPS/ENVELOPE: Brand: 3M™ STERI-STRIP™

## (undated) DEVICE — Device

## (undated) DEVICE — DRSNG SURESITE WNDW 2.38X2.75

## (undated) DEVICE — CANN SMPL SOFTECH BIFLO ETCO2 A/M 7FT

## (undated) DEVICE — ST TBG PNEUMOCLEAR EVAC SMOKE HIFLO

## (undated) DEVICE — PAD,PREPPING,CUFFED,24X48,7",NONSTERILE: Brand: MEDLINE

## (undated) DEVICE — SUT MNCRYL 4/0 PS2 27IN UD MCP426H

## (undated) DEVICE — VAGINAL PREP TRAY: Brand: MEDLINE INDUSTRIES, INC.

## (undated) DEVICE — CUFF,BP,DISP,1 TUBE,ADULT,HP: Brand: MEDLINE

## (undated) DEVICE — DRSNG TELFA PAD NONADH STR 1S 3X8IN

## (undated) DEVICE — TBG DRN URINARY 9/32IN

## (undated) DEVICE — TRAP FLD MINIVAC MEGADYNE 100ML

## (undated) DEVICE — MASK,OXYGEN,MED CONC,ADLT,7' TUB, UC: Brand: PENDING

## (undated) DEVICE — PAD D&C: Brand: MEDLINE INDUSTRIES, INC.